# Patient Record
Sex: FEMALE | Race: WHITE | NOT HISPANIC OR LATINO
[De-identification: names, ages, dates, MRNs, and addresses within clinical notes are randomized per-mention and may not be internally consistent; named-entity substitution may affect disease eponyms.]

---

## 2018-08-28 ENCOUNTER — APPOINTMENT (OUTPATIENT)
Dept: NUCLEAR MEDICINE | Facility: CLINIC | Age: 83
End: 2018-08-28
Payer: MEDICARE

## 2018-08-28 ENCOUNTER — OUTPATIENT (OUTPATIENT)
Dept: OUTPATIENT SERVICES | Facility: HOSPITAL | Age: 83
LOS: 1 days | End: 2018-08-28

## 2018-08-28 DIAGNOSIS — E21.3 HYPERPARATHYROIDISM, UNSPECIFIED: ICD-10-CM

## 2018-08-28 PROCEDURE — 78071 PARATHYRD PLANAR W/WO SUBTRJ: CPT | Mod: 26

## 2018-12-29 ENCOUNTER — INPATIENT (INPATIENT)
Facility: HOSPITAL | Age: 83
LOS: 4 days | Discharge: NURSING, REHAB OR LONG-TERM CARE | DRG: 261 | End: 2019-01-03
Attending: SURGERY | Admitting: SURGERY
Payer: MEDICARE

## 2018-12-29 VITALS
RESPIRATION RATE: 18 BRPM | DIASTOLIC BLOOD PRESSURE: 84 MMHG | SYSTOLIC BLOOD PRESSURE: 171 MMHG | OXYGEN SATURATION: 93 % | HEIGHT: 62 IN | WEIGHT: 130.07 LBS | HEART RATE: 86 BPM | TEMPERATURE: 98 F

## 2018-12-29 DIAGNOSIS — S22.39XA FRACTURE OF ONE RIB, UNSPECIFIED SIDE, INITIAL ENCOUNTER FOR CLOSED FRACTURE: ICD-10-CM

## 2018-12-29 LAB
ABO RH CONFIRMATION: SIGNIFICANT CHANGE UP
ALBUMIN SERPL ELPH-MCNC: 3.8 G/DL — SIGNIFICANT CHANGE UP (ref 3.3–5.2)
ALP SERPL-CCNC: 78 U/L — SIGNIFICANT CHANGE UP (ref 40–120)
ALT FLD-CCNC: 11 U/L — SIGNIFICANT CHANGE UP
ANION GAP SERPL CALC-SCNC: 8 MMOL/L — SIGNIFICANT CHANGE UP (ref 5–17)
APPEARANCE UR: CLEAR — SIGNIFICANT CHANGE UP
APTT BLD: 31.6 SEC — SIGNIFICANT CHANGE UP (ref 27.5–36.3)
AST SERPL-CCNC: 24 U/L — SIGNIFICANT CHANGE UP
BACTERIA # UR AUTO: ABNORMAL
BASOPHILS # BLD AUTO: 0 K/UL — SIGNIFICANT CHANGE UP (ref 0–0.2)
BASOPHILS NFR BLD AUTO: 0.3 % — SIGNIFICANT CHANGE UP (ref 0–2)
BILIRUB SERPL-MCNC: 0.5 MG/DL — SIGNIFICANT CHANGE UP (ref 0.4–2)
BILIRUB UR-MCNC: NEGATIVE — SIGNIFICANT CHANGE UP
BLD GP AB SCN SERPL QL: SIGNIFICANT CHANGE UP
BUN SERPL-MCNC: 22 MG/DL — HIGH (ref 8–20)
CALCIUM SERPL-MCNC: 12.1 MG/DL — HIGH (ref 8.6–10.2)
CHLORIDE SERPL-SCNC: 102 MMOL/L — SIGNIFICANT CHANGE UP (ref 98–107)
CO2 SERPL-SCNC: 25 MMOL/L — SIGNIFICANT CHANGE UP (ref 22–29)
COLOR SPEC: YELLOW — SIGNIFICANT CHANGE UP
CREAT SERPL-MCNC: 1.19 MG/DL — SIGNIFICANT CHANGE UP (ref 0.5–1.3)
DIFF PNL FLD: ABNORMAL
EOSINOPHIL # BLD AUTO: 0 K/UL — SIGNIFICANT CHANGE UP (ref 0–0.5)
EOSINOPHIL NFR BLD AUTO: 0.1 % — SIGNIFICANT CHANGE UP (ref 0–6)
EPI CELLS # UR: SIGNIFICANT CHANGE UP
GLUCOSE SERPL-MCNC: 127 MG/DL — HIGH (ref 70–115)
GLUCOSE UR QL: NEGATIVE MG/DL — SIGNIFICANT CHANGE UP
HCT VFR BLD CALC: 42.9 % — SIGNIFICANT CHANGE UP (ref 37–47)
HGB BLD-MCNC: 14.1 G/DL — SIGNIFICANT CHANGE UP (ref 12–16)
INR BLD: 1.07 RATIO — SIGNIFICANT CHANGE UP (ref 0.88–1.16)
KETONES UR-MCNC: NEGATIVE — SIGNIFICANT CHANGE UP
LEUKOCYTE ESTERASE UR-ACNC: ABNORMAL
LYMPHOCYTES # BLD AUTO: 1.1 K/UL — SIGNIFICANT CHANGE UP (ref 1–4.8)
LYMPHOCYTES # BLD AUTO: 10.5 % — LOW (ref 20–55)
MCHC RBC-ENTMCNC: 31.6 PG — HIGH (ref 27–31)
MCHC RBC-ENTMCNC: 32.9 G/DL — SIGNIFICANT CHANGE UP (ref 32–36)
MCV RBC AUTO: 96.2 FL — SIGNIFICANT CHANGE UP (ref 81–99)
MONOCYTES # BLD AUTO: 0.5 K/UL — SIGNIFICANT CHANGE UP (ref 0–0.8)
MONOCYTES NFR BLD AUTO: 4.6 % — SIGNIFICANT CHANGE UP (ref 3–10)
NEUTROPHILS # BLD AUTO: 9 K/UL — HIGH (ref 1.8–8)
NEUTROPHILS NFR BLD AUTO: 84.3 % — HIGH (ref 37–73)
NITRITE UR-MCNC: POSITIVE
PH UR: 7 — SIGNIFICANT CHANGE UP (ref 5–8)
PLATELET # BLD AUTO: 164 K/UL — SIGNIFICANT CHANGE UP (ref 150–400)
POTASSIUM SERPL-MCNC: 4.1 MMOL/L — SIGNIFICANT CHANGE UP (ref 3.5–5.3)
POTASSIUM SERPL-SCNC: 4.1 MMOL/L — SIGNIFICANT CHANGE UP (ref 3.5–5.3)
PROT SERPL-MCNC: 8.1 G/DL — SIGNIFICANT CHANGE UP (ref 6.6–8.7)
PROT UR-MCNC: 15 MG/DL
PROTHROM AB SERPL-ACNC: 12.3 SEC — SIGNIFICANT CHANGE UP (ref 10–12.9)
RBC # BLD: 4.46 M/UL — SIGNIFICANT CHANGE UP (ref 4.4–5.2)
RBC # FLD: 14.7 % — SIGNIFICANT CHANGE UP (ref 11–15.6)
RBC CASTS # UR COMP ASSIST: ABNORMAL /HPF (ref 0–4)
SODIUM SERPL-SCNC: 135 MMOL/L — SIGNIFICANT CHANGE UP (ref 135–145)
SP GR SPEC: 1.01 — SIGNIFICANT CHANGE UP (ref 1.01–1.02)
TYPE + AB SCN PNL BLD: SIGNIFICANT CHANGE UP
UROBILINOGEN FLD QL: NEGATIVE MG/DL — SIGNIFICANT CHANGE UP
WBC # BLD: 10.7 K/UL — SIGNIFICANT CHANGE UP (ref 4.8–10.8)
WBC # FLD AUTO: 10.7 K/UL — SIGNIFICANT CHANGE UP (ref 4.8–10.8)
WBC UR QL: ABNORMAL

## 2018-12-29 PROCEDURE — 99222 1ST HOSP IP/OBS MODERATE 55: CPT

## 2018-12-29 PROCEDURE — 99285 EMERGENCY DEPT VISIT HI MDM: CPT

## 2018-12-29 PROCEDURE — 70450 CT HEAD/BRAIN W/O DYE: CPT | Mod: 26

## 2018-12-29 PROCEDURE — 72125 CT NECK SPINE W/O DYE: CPT | Mod: 26

## 2018-12-29 PROCEDURE — 93010 ELECTROCARDIOGRAM REPORT: CPT

## 2018-12-29 PROCEDURE — 73521 X-RAY EXAM HIPS BI 2 VIEWS: CPT | Mod: 26

## 2018-12-29 PROCEDURE — 71250 CT THORAX DX C-: CPT | Mod: 26

## 2018-12-29 RX ORDER — ACETAMINOPHEN 500 MG
975 TABLET ORAL ONCE
Qty: 0 | Refills: 0 | Status: COMPLETED | OUTPATIENT
Start: 2018-12-29 | End: 2018-12-29

## 2018-12-29 RX ORDER — ONDANSETRON 8 MG/1
4 TABLET, FILM COATED ORAL EVERY 6 HOURS
Qty: 0 | Refills: 0 | Status: DISCONTINUED | OUTPATIENT
Start: 2018-12-29 | End: 2018-12-29

## 2018-12-29 RX ORDER — SENNA PLUS 8.6 MG/1
2 TABLET ORAL AT BEDTIME
Qty: 0 | Refills: 0 | Status: DISCONTINUED | OUTPATIENT
Start: 2018-12-29 | End: 2019-01-03

## 2018-12-29 RX ORDER — KETOROLAC TROMETHAMINE 30 MG/ML
10 SYRINGE (ML) INJECTION EVERY 6 HOURS
Qty: 0 | Refills: 0 | Status: DISCONTINUED | OUTPATIENT
Start: 2018-12-29 | End: 2018-12-30

## 2018-12-29 RX ORDER — ATORVASTATIN CALCIUM 80 MG/1
10 TABLET, FILM COATED ORAL AT BEDTIME
Qty: 0 | Refills: 0 | Status: DISCONTINUED | OUTPATIENT
Start: 2018-12-29 | End: 2019-01-03

## 2018-12-29 RX ORDER — ACETAMINOPHEN 500 MG
650 TABLET ORAL EVERY 6 HOURS
Qty: 0 | Refills: 0 | Status: DISCONTINUED | OUTPATIENT
Start: 2018-12-29 | End: 2019-01-03

## 2018-12-29 RX ORDER — OXYCODONE HYDROCHLORIDE 5 MG/1
5 TABLET ORAL EVERY 4 HOURS
Qty: 0 | Refills: 0 | Status: DISCONTINUED | OUTPATIENT
Start: 2018-12-29 | End: 2018-12-29

## 2018-12-29 RX ORDER — AMLODIPINE BESYLATE 2.5 MG/1
5 TABLET ORAL DAILY
Qty: 0 | Refills: 0 | Status: DISCONTINUED | OUTPATIENT
Start: 2018-12-29 | End: 2018-12-31

## 2018-12-29 RX ORDER — ASPIRIN/CALCIUM CARB/MAGNESIUM 324 MG
81 TABLET ORAL DAILY
Qty: 0 | Refills: 0 | Status: DISCONTINUED | OUTPATIENT
Start: 2018-12-29 | End: 2019-01-03

## 2018-12-29 RX ORDER — LISINOPRIL 2.5 MG/1
20 TABLET ORAL DAILY
Qty: 0 | Refills: 0 | Status: DISCONTINUED | OUTPATIENT
Start: 2018-12-29 | End: 2018-12-31

## 2018-12-29 RX ORDER — METHOCARBAMOL 500 MG/1
750 TABLET, FILM COATED ORAL EVERY 6 HOURS
Qty: 0 | Refills: 0 | Status: DISCONTINUED | OUTPATIENT
Start: 2018-12-29 | End: 2018-12-31

## 2018-12-29 RX ORDER — OXYCODONE HYDROCHLORIDE 5 MG/1
10 TABLET ORAL EVERY 4 HOURS
Qty: 0 | Refills: 0 | Status: DISCONTINUED | OUTPATIENT
Start: 2018-12-29 | End: 2018-12-29

## 2018-12-29 RX ORDER — DOCUSATE SODIUM 100 MG
100 CAPSULE ORAL THREE TIMES A DAY
Qty: 0 | Refills: 0 | Status: DISCONTINUED | OUTPATIENT
Start: 2018-12-29 | End: 2019-01-03

## 2018-12-29 RX ORDER — LIDOCAINE 4 G/100G
1 CREAM TOPICAL EVERY 24 HOURS
Qty: 0 | Refills: 0 | Status: DISCONTINUED | OUTPATIENT
Start: 2018-12-29 | End: 2019-01-03

## 2018-12-29 RX ORDER — LEVOTHYROXINE SODIUM 125 MCG
88 TABLET ORAL DAILY
Qty: 0 | Refills: 0 | Status: DISCONTINUED | OUTPATIENT
Start: 2018-12-29 | End: 2019-01-03

## 2018-12-29 RX ORDER — GABAPENTIN 400 MG/1
300 CAPSULE ORAL EVERY 8 HOURS
Qty: 0 | Refills: 0 | Status: DISCONTINUED | OUTPATIENT
Start: 2018-12-29 | End: 2018-12-31

## 2018-12-29 RX ORDER — ENOXAPARIN SODIUM 100 MG/ML
30 INJECTION SUBCUTANEOUS EVERY 12 HOURS
Qty: 0 | Refills: 0 | Status: DISCONTINUED | OUTPATIENT
Start: 2018-12-29 | End: 2018-12-30

## 2018-12-29 RX ADMIN — Medication 10 MILLIGRAM(S): at 18:17

## 2018-12-29 RX ADMIN — Medication 975 MILLIGRAM(S): at 07:01

## 2018-12-29 RX ADMIN — Medication 975 MILLIGRAM(S): at 08:11

## 2018-12-29 RX ADMIN — GABAPENTIN 300 MILLIGRAM(S): 400 CAPSULE ORAL at 11:59

## 2018-12-29 RX ADMIN — Medication 650 MILLIGRAM(S): at 11:59

## 2018-12-29 RX ADMIN — METHOCARBAMOL 750 MILLIGRAM(S): 500 TABLET, FILM COATED ORAL at 17:17

## 2018-12-29 RX ADMIN — Medication 100 MILLIGRAM(S): at 21:40

## 2018-12-29 RX ADMIN — Medication 10 MILLIGRAM(S): at 11:59

## 2018-12-29 RX ADMIN — Medication 10 MILLIGRAM(S): at 17:17

## 2018-12-29 RX ADMIN — ENOXAPARIN SODIUM 30 MILLIGRAM(S): 100 INJECTION SUBCUTANEOUS at 17:17

## 2018-12-29 RX ADMIN — Medication 650 MILLIGRAM(S): at 12:59

## 2018-12-29 RX ADMIN — ATORVASTATIN CALCIUM 10 MILLIGRAM(S): 80 TABLET, FILM COATED ORAL at 21:40

## 2018-12-29 RX ADMIN — METHOCARBAMOL 750 MILLIGRAM(S): 500 TABLET, FILM COATED ORAL at 11:59

## 2018-12-29 RX ADMIN — LIDOCAINE 1 PATCH: 4 CREAM TOPICAL at 12:00

## 2018-12-29 RX ADMIN — GABAPENTIN 300 MILLIGRAM(S): 400 CAPSULE ORAL at 21:41

## 2018-12-29 RX ADMIN — ONDANSETRON 4 MILLIGRAM(S): 8 TABLET, FILM COATED ORAL at 11:59

## 2018-12-29 RX ADMIN — Medication 650 MILLIGRAM(S): at 17:17

## 2018-12-29 RX ADMIN — SENNA PLUS 2 TABLET(S): 8.6 TABLET ORAL at 21:41

## 2018-12-29 RX ADMIN — AMLODIPINE BESYLATE 5 MILLIGRAM(S): 2.5 TABLET ORAL at 12:00

## 2018-12-29 RX ADMIN — Medication 10 MILLIGRAM(S): at 00:00

## 2018-12-29 RX ADMIN — Medication 650 MILLIGRAM(S): at 18:17

## 2018-12-29 RX ADMIN — LIDOCAINE 1 PATCH: 4 CREAM TOPICAL at 19:30

## 2018-12-29 NOTE — H&P ADULT - NSHPSOCIALHISTORY_GEN_ALL_CORE
denies toxic habits x3  lives with daughter in basement level of house. does not ambulate with assistive device

## 2018-12-29 NOTE — H&P ADULT - HISTORY OF PRESENT ILLNESS
85 year old female BIBEMs after an unwitnessed fall this morning while trying to use the restroom. Patient reports her legs became weak and she fell while hitting the left side of her body on the vanity. No loss of consciousness, but did not attempt to ambulate after the fall. Daughter found patient on the bathroom floor shortly after the incident. Complains of left sided chest wall pain, but no shortness of breath     A: airway intact  B: breath sounds clear to auscultation bilaterally  C: central/peripheral pulse 2+ bilaterally  D: GCS 15, pupils 3mm equal and reactive to light bilaterally  E: full exposure achieved with no gross deformities noted

## 2018-12-29 NOTE — ED PROVIDER NOTE - CHPI ED SYMPTOMS NEG
no bleeding/no confusion/no deformity/no weakness/no tingling/no abrasion/no fever/no numbness/no loss of consciousness/no vomiting

## 2018-12-29 NOTE — H&P ADULT - NSHPREVIEWOFSYSTEMS_GEN_ALL_CORE
denies fevers, chills, nausea, vomiting, chest pain, palpitations, abdominal pain, numbness/weakness in extremities

## 2018-12-29 NOTE — ED ADULT NURSE NOTE - OBJECTIVE STATEMENT
Patient A&Ox4 complaining of pain to left side ribs secondary to fall. Patient stated stood up & lost balance due to "knees buckling." Denies any shortness of breath, nausea or dizziness.

## 2018-12-29 NOTE — ED PROVIDER NOTE - MEDICAL DECISION MAKING DETAILS
see progress;  signed over to oncoming physician pending trauma eval Will obtain labs and CT for rib fx  see progress;  signed over to oncoming physician pending trauma eval

## 2018-12-29 NOTE — ED ADULT TRIAGE NOTE - CHIEF COMPLAINT QUOTE
I took a while to get off my recliner to go to bathroom  I got up and finally went to the bathroom both of my legs gave up and fell to floor , my left side  ribs hurt I did not hit my head, pt alert and oriented x 4 no visible signs of injuries noted pt [place]  denies blood thinners

## 2018-12-29 NOTE — ED ADULT NURSE NOTE - CHIEF COMPLAINT QUOTE
I took a while to get off my recliner to go to bathroom  I got up and finally went to the bathroom both of my legs gave up and fell to floor , my left side  ribs hurt I did not hit my head, pt alert and oriented x 4 no visible signs of injuries noted pt [place]  denies blood thinners

## 2018-12-29 NOTE — H&P ADULT - NSHPPHYSICALEXAM_GEN_ALL_CORE
Constitutional: Well-developed well nourished female in no acute distress  HEENT: Head is normocephalic and atraumatic, maxillofacial structures stable, no blood or discharge from nares or oral cavity, no robertson sign / raccoon eyes, EOMI b/l, pupils 3mm round and reactive to light b/l, no active drainage or redness  Neck: trachea midline  Respiratory: Breath sounds CTA b/l respirations are unlabored, no accessory muscle use, no conversational dyspnea  Cardiovascular: Regular rate & rhythm, +S1, S2  Chest: left lateral chest wall tenderness, no ecchymosis, no subQ emphysema or crepitus palpated  Gastrointestinal: Abdomen soft, non-tender, non-distended, no rebound tenderness / guarding, no ecchymosis or external signs of abdominal trauma  Extremities: moving all extremities spontaneously, no point tenderness or deformity noted to upper or lower extremities b/l  Pelvis: stable  Vascular: 2+ radial, femoral, and DP pulses b/l  Neurological: GCS: 15 (4/5/6). A&O x 3; no gross sensory / motor / coordination deficits  Musculoskeletal: 5/5 strength of upper and lower extremities b/l  Back: no C/T/LS spine tenderness to palpation, no step-offs or signs of external trauma to the back

## 2018-12-29 NOTE — H&P ADULT - ASSESSMENT
85 year old female s/p mechanical fall without loss of consciousness sustaining left sided 5th/6th rib fractures. Not requiring ICU admission, but will admit to surgical floor for pain control, and pulmonary exercises

## 2018-12-29 NOTE — H&P ADULT - ATTENDING COMMENTS
Patient seen and examined.  went to bathroom and patient states " legs gave out" complaining left chest pain.  ABCDE intact.  CT reviewed 2 left rib fractures.  Admit for multimodality analgesia  PT eval  EKG.  Cardiology to be consulted.  Aware of long history of hypercalcemia, Family states her parathyroids and pancreas have been studied and all work up has been negative.

## 2018-12-29 NOTE — ED PROVIDER NOTE - ATTENDING CONTRIBUTION TO CARE
seen with acp: pleasant elderly female with left anterolateral rib pain s/p mechanical trip and fall; head NCAT, no c spine ttp; +ttp left anterolateral upper ribs; normal heart and lung sounds; abd soft nt nd; no pelvic bone ttp, +FROM b/l hips and knees; pain control, ct imaging noted, trauma consulted, signed over to oncoming physician

## 2018-12-29 NOTE — H&P ADULT - PROBLEM SELECTOR PLAN 1
-admit to ACS  -PIC protocol  -pain control  -encourage IS use  -restart home medications  -regular diet  -PT/OT/PMR  -dvt ppx

## 2018-12-29 NOTE — ED PROVIDER NOTE - OBJECTIVE STATEMENT
PT is an 85y F presenting Hypercholesterolemia Hypertension Hypothyroid presenting s/p fall after legs gave out in bathroom. She states she hit her L ribs on the corner of the bathroom vanity. She denies any head injury or LOC. She is normally ambulatory without assistance. She denies any nausea/vomiting. She reports difficulty with inspiration but no respiratory distress. No other complaints.

## 2018-12-30 LAB
ALBUMIN SERPL ELPH-MCNC: 3.7 G/DL — SIGNIFICANT CHANGE UP (ref 3.3–5.2)
ALP SERPL-CCNC: 70 U/L — SIGNIFICANT CHANGE UP (ref 40–120)
ALT FLD-CCNC: 9 U/L — SIGNIFICANT CHANGE UP
ANION GAP SERPL CALC-SCNC: 11 MMOL/L — SIGNIFICANT CHANGE UP (ref 5–17)
APTT BLD: 46 SEC — HIGH (ref 27.5–36.3)
AST SERPL-CCNC: 21 U/L — SIGNIFICANT CHANGE UP
BASOPHILS # BLD AUTO: 0 K/UL — SIGNIFICANT CHANGE UP (ref 0–0.2)
BASOPHILS NFR BLD AUTO: 0.2 % — SIGNIFICANT CHANGE UP (ref 0–2)
BILIRUB SERPL-MCNC: 0.6 MG/DL — SIGNIFICANT CHANGE UP (ref 0.4–2)
BUN SERPL-MCNC: 25 MG/DL — HIGH (ref 8–20)
CALCIUM SERPL-MCNC: 11.4 MG/DL — HIGH (ref 8.6–10.2)
CHLORIDE SERPL-SCNC: 101 MMOL/L — SIGNIFICANT CHANGE UP (ref 98–107)
CO2 SERPL-SCNC: 23 MMOL/L — SIGNIFICANT CHANGE UP (ref 22–29)
CREAT SERPL-MCNC: 1.48 MG/DL — HIGH (ref 0.5–1.3)
EOSINOPHIL # BLD AUTO: 0.3 K/UL — SIGNIFICANT CHANGE UP (ref 0–0.5)
EOSINOPHIL NFR BLD AUTO: 2.5 % — SIGNIFICANT CHANGE UP (ref 0–6)
GLUCOSE SERPL-MCNC: 89 MG/DL — SIGNIFICANT CHANGE UP (ref 70–115)
HCT VFR BLD CALC: 41.9 % — SIGNIFICANT CHANGE UP (ref 37–47)
HGB BLD-MCNC: 14 G/DL — SIGNIFICANT CHANGE UP (ref 12–16)
INR BLD: 1.15 RATIO — SIGNIFICANT CHANGE UP (ref 0.88–1.16)
LYMPHOCYTES # BLD AUTO: 1.3 K/UL — SIGNIFICANT CHANGE UP (ref 1–4.8)
LYMPHOCYTES # BLD AUTO: 12.6 % — LOW (ref 20–55)
MAGNESIUM SERPL-MCNC: 2.2 MG/DL — SIGNIFICANT CHANGE UP (ref 1.6–2.6)
MCHC RBC-ENTMCNC: 32.6 PG — HIGH (ref 27–31)
MCHC RBC-ENTMCNC: 33.4 G/DL — SIGNIFICANT CHANGE UP (ref 32–36)
MCV RBC AUTO: 97.4 FL — SIGNIFICANT CHANGE UP (ref 81–99)
MONOCYTES # BLD AUTO: 0.7 K/UL — SIGNIFICANT CHANGE UP (ref 0–0.8)
MONOCYTES NFR BLD AUTO: 6.7 % — SIGNIFICANT CHANGE UP (ref 3–10)
NEUTROPHILS # BLD AUTO: 8 K/UL — SIGNIFICANT CHANGE UP (ref 1.8–8)
NEUTROPHILS NFR BLD AUTO: 77.7 % — HIGH (ref 37–73)
PHOSPHATE SERPL-MCNC: 2.7 MG/DL — SIGNIFICANT CHANGE UP (ref 2.4–4.7)
PLATELET # BLD AUTO: 165 K/UL — SIGNIFICANT CHANGE UP (ref 150–400)
POTASSIUM SERPL-MCNC: 4.4 MMOL/L — SIGNIFICANT CHANGE UP (ref 3.5–5.3)
POTASSIUM SERPL-SCNC: 4.4 MMOL/L — SIGNIFICANT CHANGE UP (ref 3.5–5.3)
PROT SERPL-MCNC: 7.4 G/DL — SIGNIFICANT CHANGE UP (ref 6.6–8.7)
PROTHROM AB SERPL-ACNC: 13.3 SEC — HIGH (ref 10–12.9)
RBC # BLD: 4.3 M/UL — LOW (ref 4.4–5.2)
RBC # FLD: 14.9 % — SIGNIFICANT CHANGE UP (ref 11–15.6)
SODIUM SERPL-SCNC: 135 MMOL/L — SIGNIFICANT CHANGE UP (ref 135–145)
WBC # BLD: 10.3 K/UL — SIGNIFICANT CHANGE UP (ref 4.8–10.8)
WBC # FLD AUTO: 10.3 K/UL — SIGNIFICANT CHANGE UP (ref 4.8–10.8)

## 2018-12-30 PROCEDURE — 93010 ELECTROCARDIOGRAM REPORT: CPT

## 2018-12-30 RX ORDER — SODIUM CHLORIDE 9 MG/ML
1000 INJECTION, SOLUTION INTRAVENOUS
Qty: 0 | Refills: 0 | Status: DISCONTINUED | OUTPATIENT
Start: 2018-12-30 | End: 2019-01-01

## 2018-12-30 RX ORDER — HEPARIN SODIUM 5000 [USP'U]/ML
5000 INJECTION INTRAVENOUS; SUBCUTANEOUS EVERY 8 HOURS
Qty: 0 | Refills: 0 | Status: DISCONTINUED | OUTPATIENT
Start: 2018-12-30 | End: 2019-01-03

## 2018-12-30 RX ADMIN — Medication 100 MILLIGRAM(S): at 22:11

## 2018-12-30 RX ADMIN — LIDOCAINE 1 PATCH: 4 CREAM TOPICAL at 00:00

## 2018-12-30 RX ADMIN — AMLODIPINE BESYLATE 5 MILLIGRAM(S): 2.5 TABLET ORAL at 05:19

## 2018-12-30 RX ADMIN — SENNA PLUS 2 TABLET(S): 8.6 TABLET ORAL at 22:11

## 2018-12-30 RX ADMIN — METHOCARBAMOL 750 MILLIGRAM(S): 500 TABLET, FILM COATED ORAL at 13:11

## 2018-12-30 RX ADMIN — Medication 650 MILLIGRAM(S): at 13:11

## 2018-12-30 RX ADMIN — Medication 650 MILLIGRAM(S): at 06:19

## 2018-12-30 RX ADMIN — GABAPENTIN 300 MILLIGRAM(S): 400 CAPSULE ORAL at 05:19

## 2018-12-30 RX ADMIN — Medication 100 MILLIGRAM(S): at 05:19

## 2018-12-30 RX ADMIN — Medication 650 MILLIGRAM(S): at 18:23

## 2018-12-30 RX ADMIN — GABAPENTIN 300 MILLIGRAM(S): 400 CAPSULE ORAL at 13:11

## 2018-12-30 RX ADMIN — LISINOPRIL 20 MILLIGRAM(S): 2.5 TABLET ORAL at 05:19

## 2018-12-30 RX ADMIN — Medication 650 MILLIGRAM(S): at 22:12

## 2018-12-30 RX ADMIN — Medication 100 MILLIGRAM(S): at 13:11

## 2018-12-30 RX ADMIN — HEPARIN SODIUM 5000 UNIT(S): 5000 INJECTION INTRAVENOUS; SUBCUTANEOUS at 16:07

## 2018-12-30 RX ADMIN — Medication 81 MILLIGRAM(S): at 13:11

## 2018-12-30 RX ADMIN — LIDOCAINE 1 PATCH: 4 CREAM TOPICAL at 13:12

## 2018-12-30 RX ADMIN — Medication 650 MILLIGRAM(S): at 14:11

## 2018-12-30 RX ADMIN — Medication 650 MILLIGRAM(S): at 17:23

## 2018-12-30 RX ADMIN — HEPARIN SODIUM 5000 UNIT(S): 5000 INJECTION INTRAVENOUS; SUBCUTANEOUS at 22:13

## 2018-12-30 RX ADMIN — METHOCARBAMOL 750 MILLIGRAM(S): 500 TABLET, FILM COATED ORAL at 17:23

## 2018-12-30 RX ADMIN — HEPARIN SODIUM 5000 UNIT(S): 5000 INJECTION INTRAVENOUS; SUBCUTANEOUS at 05:18

## 2018-12-30 RX ADMIN — METHOCARBAMOL 750 MILLIGRAM(S): 500 TABLET, FILM COATED ORAL at 23:40

## 2018-12-30 RX ADMIN — Medication 650 MILLIGRAM(S): at 23:12

## 2018-12-30 RX ADMIN — Medication 88 MICROGRAM(S): at 05:19

## 2018-12-30 RX ADMIN — ATORVASTATIN CALCIUM 10 MILLIGRAM(S): 80 TABLET, FILM COATED ORAL at 22:11

## 2018-12-30 RX ADMIN — GABAPENTIN 300 MILLIGRAM(S): 400 CAPSULE ORAL at 22:11

## 2018-12-30 RX ADMIN — SODIUM CHLORIDE 75 MILLILITER(S): 9 INJECTION, SOLUTION INTRAVENOUS at 17:23

## 2018-12-30 RX ADMIN — Medication 650 MILLIGRAM(S): at 05:19

## 2018-12-30 NOTE — PROVIDER CONTACT NOTE (OTHER) - SITUATION
Patient has living will in chart expressing wishes to be DNR.  Currently no signed DNR in chart.  RN staff have spoke with ACS team since 12/29/18.

## 2018-12-31 LAB
-  AMIKACIN: SIGNIFICANT CHANGE UP
-  AMPICILLIN/SULBACTAM: SIGNIFICANT CHANGE UP
-  AMPICILLIN: SIGNIFICANT CHANGE UP
-  AZTREONAM: SIGNIFICANT CHANGE UP
-  CEFAZOLIN: SIGNIFICANT CHANGE UP
-  CEFEPIME: SIGNIFICANT CHANGE UP
-  CEFOXITIN: SIGNIFICANT CHANGE UP
-  CEFTRIAXONE: SIGNIFICANT CHANGE UP
-  CIPROFLOXACIN: SIGNIFICANT CHANGE UP
-  ERTAPENEM: SIGNIFICANT CHANGE UP
-  GENTAMICIN: SIGNIFICANT CHANGE UP
-  IMIPENEM: SIGNIFICANT CHANGE UP
-  LEVOFLOXACIN: SIGNIFICANT CHANGE UP
-  MEROPENEM: SIGNIFICANT CHANGE UP
-  NITROFURANTOIN: SIGNIFICANT CHANGE UP
-  PIPERACILLIN/TAZOBACTAM: SIGNIFICANT CHANGE UP
-  TIGECYCLINE: SIGNIFICANT CHANGE UP
-  TOBRAMYCIN: SIGNIFICANT CHANGE UP
-  TRIMETHOPRIM/SULFAMETHOXAZOLE: SIGNIFICANT CHANGE UP
ANION GAP SERPL CALC-SCNC: 9 MMOL/L — SIGNIFICANT CHANGE UP (ref 5–17)
BASOPHILS # BLD AUTO: 0 K/UL — SIGNIFICANT CHANGE UP (ref 0–0.2)
BASOPHILS NFR BLD AUTO: 0.5 % — SIGNIFICANT CHANGE UP (ref 0–2)
BUN SERPL-MCNC: 28 MG/DL — HIGH (ref 8–20)
CALCIUM SERPL-MCNC: 10.3 MG/DL — HIGH (ref 8.6–10.2)
CHLORIDE SERPL-SCNC: 102 MMOL/L — SIGNIFICANT CHANGE UP (ref 98–107)
CO2 SERPL-SCNC: 22 MMOL/L — SIGNIFICANT CHANGE UP (ref 22–29)
CREAT SERPL-MCNC: 1.29 MG/DL — SIGNIFICANT CHANGE UP (ref 0.5–1.3)
CULTURE RESULTS: SIGNIFICANT CHANGE UP
EOSINOPHIL # BLD AUTO: 0.2 K/UL — SIGNIFICANT CHANGE UP (ref 0–0.5)
EOSINOPHIL NFR BLD AUTO: 2.9 % — SIGNIFICANT CHANGE UP (ref 0–6)
GLUCOSE BLDC GLUCOMTR-MCNC: 93 MG/DL — SIGNIFICANT CHANGE UP (ref 70–99)
GLUCOSE SERPL-MCNC: 101 MG/DL — SIGNIFICANT CHANGE UP (ref 70–115)
HCT VFR BLD CALC: 37.4 % — SIGNIFICANT CHANGE UP (ref 37–47)
HGB BLD-MCNC: 12.1 G/DL — SIGNIFICANT CHANGE UP (ref 12–16)
LYMPHOCYTES # BLD AUTO: 1.6 K/UL — SIGNIFICANT CHANGE UP (ref 1–4.8)
LYMPHOCYTES # BLD AUTO: 18.3 % — LOW (ref 20–55)
MAGNESIUM SERPL-MCNC: 1.9 MG/DL — SIGNIFICANT CHANGE UP (ref 1.6–2.6)
MCHC RBC-ENTMCNC: 31.7 PG — HIGH (ref 27–31)
MCHC RBC-ENTMCNC: 32.4 G/DL — SIGNIFICANT CHANGE UP (ref 32–36)
MCV RBC AUTO: 97.9 FL — SIGNIFICANT CHANGE UP (ref 81–99)
METHOD TYPE: SIGNIFICANT CHANGE UP
MONOCYTES # BLD AUTO: 0.6 K/UL — SIGNIFICANT CHANGE UP (ref 0–0.8)
MONOCYTES NFR BLD AUTO: 7.4 % — SIGNIFICANT CHANGE UP (ref 3–10)
NEUTROPHILS # BLD AUTO: 6.1 K/UL — SIGNIFICANT CHANGE UP (ref 1.8–8)
NEUTROPHILS NFR BLD AUTO: 70.7 % — SIGNIFICANT CHANGE UP (ref 37–73)
ORGANISM # SPEC MICROSCOPIC CNT: SIGNIFICANT CHANGE UP
ORGANISM # SPEC MICROSCOPIC CNT: SIGNIFICANT CHANGE UP
PHOSPHATE SERPL-MCNC: 2 MG/DL — LOW (ref 2.4–4.7)
PLATELET # BLD AUTO: 165 K/UL — SIGNIFICANT CHANGE UP (ref 150–400)
POTASSIUM SERPL-MCNC: 4.6 MMOL/L — SIGNIFICANT CHANGE UP (ref 3.5–5.3)
POTASSIUM SERPL-SCNC: 4.6 MMOL/L — SIGNIFICANT CHANGE UP (ref 3.5–5.3)
RBC # BLD: 3.82 M/UL — LOW (ref 4.4–5.2)
RBC # FLD: 14.8 % — SIGNIFICANT CHANGE UP (ref 11–15.6)
SODIUM SERPL-SCNC: 133 MMOL/L — LOW (ref 135–145)
SPECIMEN SOURCE: SIGNIFICANT CHANGE UP
WBC # BLD: 8.6 K/UL — SIGNIFICANT CHANGE UP (ref 4.8–10.8)
WBC # FLD AUTO: 8.6 K/UL — SIGNIFICANT CHANGE UP (ref 4.8–10.8)

## 2018-12-31 PROCEDURE — 93010 ELECTROCARDIOGRAM REPORT: CPT

## 2018-12-31 PROCEDURE — 99222 1ST HOSP IP/OBS MODERATE 55: CPT

## 2018-12-31 PROCEDURE — 99233 SBSQ HOSP IP/OBS HIGH 50: CPT

## 2018-12-31 RX ORDER — SODIUM CHLORIDE 9 MG/ML
500 INJECTION, SOLUTION INTRAVENOUS ONCE
Qty: 0 | Refills: 0 | Status: COMPLETED | OUTPATIENT
Start: 2018-12-31 | End: 2018-12-31

## 2018-12-31 RX ORDER — POTASSIUM PHOSPHATE, MONOBASIC POTASSIUM PHOSPHATE, DIBASIC 236; 224 MG/ML; MG/ML
15 INJECTION, SOLUTION INTRAVENOUS ONCE
Qty: 0 | Refills: 0 | Status: COMPLETED | OUTPATIENT
Start: 2018-12-31 | End: 2018-12-31

## 2018-12-31 RX ORDER — CEPHALEXIN 500 MG
250 CAPSULE ORAL EVERY 8 HOURS
Qty: 0 | Refills: 0 | Status: DISCONTINUED | OUTPATIENT
Start: 2018-12-31 | End: 2019-01-03

## 2018-12-31 RX ADMIN — Medication 100 MILLIGRAM(S): at 05:50

## 2018-12-31 RX ADMIN — Medication 650 MILLIGRAM(S): at 18:55

## 2018-12-31 RX ADMIN — METHOCARBAMOL 750 MILLIGRAM(S): 500 TABLET, FILM COATED ORAL at 05:50

## 2018-12-31 RX ADMIN — Medication 650 MILLIGRAM(S): at 18:24

## 2018-12-31 RX ADMIN — AMLODIPINE BESYLATE 5 MILLIGRAM(S): 2.5 TABLET ORAL at 05:50

## 2018-12-31 RX ADMIN — HEPARIN SODIUM 5000 UNIT(S): 5000 INJECTION INTRAVENOUS; SUBCUTANEOUS at 14:47

## 2018-12-31 RX ADMIN — HEPARIN SODIUM 5000 UNIT(S): 5000 INJECTION INTRAVENOUS; SUBCUTANEOUS at 05:51

## 2018-12-31 RX ADMIN — Medication 650 MILLIGRAM(S): at 14:42

## 2018-12-31 RX ADMIN — LIDOCAINE 1 PATCH: 4 CREAM TOPICAL at 20:36

## 2018-12-31 RX ADMIN — Medication 650 MILLIGRAM(S): at 05:50

## 2018-12-31 RX ADMIN — POTASSIUM PHOSPHATE, MONOBASIC POTASSIUM PHOSPHATE, DIBASIC 62.5 MILLIMOLE(S): 236; 224 INJECTION, SOLUTION INTRAVENOUS at 14:45

## 2018-12-31 RX ADMIN — SODIUM CHLORIDE 500 MILLILITER(S): 9 INJECTION, SOLUTION INTRAVENOUS at 09:43

## 2018-12-31 RX ADMIN — Medication 650 MILLIGRAM(S): at 06:50

## 2018-12-31 RX ADMIN — Medication 81 MILLIGRAM(S): at 14:43

## 2018-12-31 RX ADMIN — Medication 650 MILLIGRAM(S): at 15:15

## 2018-12-31 RX ADMIN — LISINOPRIL 20 MILLIGRAM(S): 2.5 TABLET ORAL at 05:50

## 2018-12-31 RX ADMIN — GABAPENTIN 300 MILLIGRAM(S): 400 CAPSULE ORAL at 05:50

## 2018-12-31 RX ADMIN — Medication 100 MILLIGRAM(S): at 12:57

## 2018-12-31 RX ADMIN — LIDOCAINE 1 PATCH: 4 CREAM TOPICAL at 01:00

## 2018-12-31 RX ADMIN — Medication 88 MICROGRAM(S): at 05:50

## 2018-12-31 RX ADMIN — LIDOCAINE 1 PATCH: 4 CREAM TOPICAL at 14:44

## 2018-12-31 NOTE — OCCUPATIONAL THERAPY INITIAL EVALUATION ADULT - NS ASR FOLLOW COMMAND OT EVAL
100% of the time/pt requires increased time and repetition to process commands of assessment/able to follow single-step instructions

## 2018-12-31 NOTE — PHYSICAL THERAPY INITIAL EVALUATION ADULT - ADDITIONAL COMMENTS
Pt lives in a apt in her daughters house with 6 steps to enter with bilateral  rails and 0  stairs inside.   Pt owns medical equipment: SAC-refuses to use as per daughters   Pt lives with: Alone in an apartment in the downstairs of her daughters house.   Daughter is available to provide 24hr care

## 2018-12-31 NOTE — CONSULT NOTE ADULT - SUBJECTIVE AND OBJECTIVE BOX
85yF was admitted on 12-29 after a fall in bathroom. In ED, GCS=15 with left sided chest pain. Workup showed left 5-6 rib fractures. She was placed on PIC protocol.     Pain is currently well managed. Reports some left sided pain with breathing in and coughing.   As per daughters, patient worked with PT and was ambulated to door and back. NO documentation yet.       REVIEW OF SYSTEMS  Constitutional - No fever, No weight loss, +fatigue  HEENT - No eye pain, No visual disturbances, No difficulty hearing, No tinnitus, No vertigo, No neck pain  Respiratory - No cough, No wheezing, No shortness of breath  Cardiovascular - No chest pain, No palpitations  Gastrointestinal - No abdominal pain, No nausea, No vomiting, No diarrhea, No constipation  Genitourinary - No dysuria, No frequency, No hematuria, No incontinence  Neurological - No headaches, No memory loss, No loss of strength, No numbness, No tremors  Skin - No itching, No rashes, No lesions   Endocrine - No temperature intolerance  Musculoskeletal - +joint pain, No joint swelling, +muscle pain  Psychiatric - No depression, +anxiety    VITALS  T(C): 36.6 (12-31-18 @ 08:05), Max: 36.9 (12-31-18 @ 05:03)  HR: 56 (12-31-18 @ 08:05) (56 - 66)  BP: 108/61 (12-31-18 @ 08:05) (101/62 - 131/68)  RR: 18 (12-31-18 @ 08:05) (18 - 18)  SpO2: 99% (12-31-18 @ 08:05) (95% - 99%)  Wt(kg): --    PAST MEDICAL & SURGICAL HISTORY  Hypertension  Hypercholesterolemia  Hypothyroid  No significant past surgical history      SOCIAL HISTORY  Smoking - Denied  EtOH - Denied   Drugs - Denied    FUNCTIONAL HISTORY  Lives with daughter who works  Independent    CURRENT FUNCTIONAL STATUS  Min-Mod A with chair mobility    FAMILY HISTORY   No pertinent family history in first degree relatives      RECENT LABS/IMAGING  CBC Full  -  ( 31 Dec 2018 06:47 )  WBC Count : 8.6 K/uL  Hemoglobin : 12.1 g/dL  Hematocrit : 37.4 %  Platelet Count - Automated : 165 K/uL  Mean Cell Volume : 97.9 fl  Mean Cell Hemoglobin : 31.7 pg  Mean Cell Hemoglobin Concentration : 32.4 g/dL  Auto Neutrophil # : 6.1 K/uL  Auto Lymphocyte # : 1.6 K/uL  Auto Monocyte # : 0.6 K/uL  Auto Eosinophil # : 0.2 K/uL  Auto Basophil # : 0.0 K/uL  Auto Neutrophil % : 70.7 %  Auto Lymphocyte % : 18.3 %  Auto Monocyte % : 7.4 %  Auto Eosinophil % : 2.9 %  Auto Basophil % : 0.5 %    12-31    133<L>  |  102  |  28.0<H>  ----------------------------<  101  4.6   |  22.0  |  1.29    Ca    10.3<H>      31 Dec 2018 06:47  Phos  2.0     12-31  Mg     1.9     12-31    TPro  7.4  /  Alb  3.7  /  TBili  0.6  /  DBili  x   /  AST  21  /  ALT  9   /  AlkPhos  70  12-30        ALLERGIES  penicillins (Unknown)      MEDICATIONS   acetaminophen   Tablet .. 650 milliGRAM(s) Oral every 6 hours  amLODIPine   Tablet 5 milliGRAM(s) Oral daily  aspirin enteric coated 81 milliGRAM(s) Oral daily  atorvastatin 10 milliGRAM(s) Oral at bedtime  docusate sodium 100 milliGRAM(s) Oral three times a day  gabapentin 300 milliGRAM(s) Oral every 8 hours  heparin  Injectable 5000 Unit(s) SubCutaneous every 8 hours  lactated ringers. 1000 milliLiter(s) IV Continuous <Continuous>  levothyroxine 88 MICROGram(s) Oral daily  lidocaine   Patch 1 Patch Transdermal every 24 hours  lisinopril 20 milliGRAM(s) Oral daily  methocarbamol 750 milliGRAM(s) Oral every 6 hours  senna 2 Tablet(s) Oral at bedtime      ----------------------------------------------------------------------------------------  PHYSICAL EXAM  Constitutional - NAD, Comfortable  HEENT - NCAT, EOMI  Neck - Supple, No limited ROM  Chest - Breathing comfortably, No wheezing  Cardiovascular - S1S2   Abdomen - Soft   Extremities - No C/C/E, No calf tenderness   Neurologic Exam -                    Cognitive - Awake, Alert, AAO to self, place, date, year, situation     Communication - Fluent, No dysarthria     Motor - No focal deficits                    LEFT    UE - ShAB 5/5, EF 5/5, EE 5/5, WE 5/5,  5/5                    RIGHT UE - ShAB 5/5, EF 5/5, EE 5/5, WE 5/5,  5/5                    LEFT    LE - HF 5/5, KE 5/5, DF 5/5, PF 5/5                    RIGHT LE - HF 5/5, KE 5/5, DF 5/5, PF 5/5        Sensory - Intact to LT  Psychiatric - Mood stable, Affect WNL  ----------------------------------------------------------------------------------------  ASSESSMENT/PLAN  85yFemale with functional deficits after a mechanical fall sustaining rib fractures  Pain - Tylenol, Neurontin, Lidoderm, Robaxin  CAD - ASA, Lipitor  HTN 0 Norvasc  DVT PPX - SCDs, Heparin  Rehab - Family and patient want a short course of KATE.  Recommend KATE, patient DOES NOT meet acute inpatient rehabilitation criteria. Will sign off, please reconsult if needed for rehab dispo recommendations.
[facility]                                   [address] [place] [address]                                                      PHONE: [phone]                                                         FAX: [phone]  [email or web address]  ---------------------------------------------------------------------------------------------------------------------------------    HPI:  YADIRA LEVI is an 85y Female s/p fall  ( not clear whether due to syncope ).    PAST MEDICAL & SURGICAL HISTORY:  Hypertension  Hypercholesterolemia  Hypothyroid  No significant past surgical history      penicillins (Unknown)      MEDICATIONS  (STANDING):  acetaminophen   Tablet .. 650 milliGRAM(s) Oral every 6 hours  amLODIPine   Tablet 5 milliGRAM(s) Oral daily  aspirin enteric coated 81 milliGRAM(s) Oral daily  atorvastatin 10 milliGRAM(s) Oral at bedtime  docusate sodium 100 milliGRAM(s) Oral three times a day  gabapentin 300 milliGRAM(s) Oral every 8 hours  heparin  Injectable 5000 Unit(s) SubCutaneous every 8 hours  levothyroxine 88 MICROGram(s) Oral daily  lidocaine   Patch 1 Patch Transdermal every 24 hours  lisinopril 20 milliGRAM(s) Oral daily  methocarbamol 750 milliGRAM(s) Oral every 6 hours  senna 2 Tablet(s) Oral at bedtime    MEDICATIONS  (PRN):      Family History: Pt denies hx of early cad, SCD, or congenital heart disease.      Social History:  Cigarettes:        0            Alchohol:         0        Illicit Drug Abuse:  0    ROS:  Constitutional: No fever, weight loss or fatigue  Eyes: No eye pain, visual disturbances, or discharge  ENMT:  No difficulty hearing, tinnitus, vertigo; No sinus or throat pain  Neck: No pain or stiffness  Respiratory: No cough, wheezing, chills or hemoptysis  Cardiovascular: No chest pain, palpitations, shortness of breath, dizziness or leg swelling  Gastrointestinal: No abdominal or epigastric pain. No nausea, vomiting or hematemesis; No diarrhea or constipation. No melena or hematochezia.  Genitourinary: No dysuria, frequency, hematuria or incontinence  Rectal: No pain, hemorrhoids or incontinence  Neurological: No headaches, memory loss, loss of strength, numbness or tremors  Skin: No itching, burning, rashes or lesions   Lymph Nodes: No enlarged glands  Endocrine: No heat or cold intolerance; No hair loss  Musculoskeletal: No joint pain or swelling; No muscle, back or extremity pain  Psychiatric: No depression, anxiety, mood swings or difficulty sleeping  Heme/Lymph: No easy bruising or bleeding gums  Allergy and Immunologic: No hives or eczema    Vital Signs Last 24 Hrs  T(C): 36.6 (30 Dec 2018 04:30), Max: 36.7 (29 Dec 2018 11:54)  T(F): 97.9 (30 Dec 2018 04:30), Max: 98.1 (29 Dec 2018 11:54)  HR: 48 (30 Dec 2018 04:30) (47 - 60)  BP: 124/55 (30 Dec 2018 04:30) (117/69 - 144/76)  BP(mean): 98 (29 Dec 2018 13:50) (98 - 98)  RR: 18 (30 Dec 2018 04:30) (16 - 19)  SpO2: 98% (30 Dec 2018 04:30) (92% - 99%)  ICU Vital Signs Last 24 Hrs  YADIRA LEVI  I&O's Detail    29 Dec 2018 07:  -  30 Dec 2018 07:00  --------------------------------------------------------  IN:    Oral Fluid: 500 mL  Total IN: 500 mL    OUT:  Total OUT: 0 mL    Total NET: 500 mL        I&O's Summary    29 Dec 2018 07:  -  30 Dec 2018 07:00  --------------------------------------------------------  IN: 500 mL / OUT: 0 mL / NET: 500 mL      Drug Dosing Weight  YADIRA LEVI      PHYSICAL EXAM:  General: Appears well developed, well nourished alert and cooperative.  HEENT: Head; normocephalic, atraumatic.  Eyes: Pupils reactive, cornea wnl.  Neck: Supple, no nodes adenopathy, no NVD or carotid bruit or thyromegaly.  CARDIOVASCULAR: Normal S1 and S2, No murmur, rub, gallop or lift.   LUNGS: No rales, rhonchi or wheeze. Normal breath sounds bilaterally.  ABDOMEN: Soft, nontender without mass or organomegaly. bowel sounds normoactive.  EXTREMITIES: No clubbing, cyanosis or edema. Distal pulses wnl.   SKIN: warm and dry with normal turgor.  NEURO: Alert/oriented x 3/normal motor exam. No pathologic reflexes.    PSYCH: normal affect.        LABS:                        14.0   10.3  )-----------( 165      ( 30 Dec 2018 06:55 )             41.9     12    135  |  101  |  25.0<H>  ----------------------------<  89  4.4   |  23.0  |  1.48<H>    Ca    11.4<H>      30 Dec 2018 06:54  Phos  2.7     12-30  Mg     2.2         TPro  7.4  /  Alb  3.7  /  TBili  0.6  /  DBili  x   /  AST  21  /  ALT  9   /  AlkPhos  70      YADIRA LEVI      PT/INR - ( 30 Dec 2018 06:55 )   PT: 13.3 sec;   INR: 1.15 ratio         PTT - ( 30 Dec 2018 06:55 )  PTT:46.0 sec  Urinalysis Basic - ( 29 Dec 2018 06:15 )    Color: Yellow / Appearance: Clear / S.010 / pH: x  Gluc: x / Ketone: Negative  / Bili: Negative / Urobili: Negative mg/dL   Blood: x / Protein: 15 mg/dL / Nitrite: Positive   Leuk Esterase: Moderate / RBC: 3-5 /HPF / WBC 6-10   Sq Epi: x / Non Sq Epi: Occasional / Bacteria: Occasional        RADIOLOGY & ADDITIONAL STUDIES:    INTERPRETATION OF TELEMETRY (personally reviewed):    ECG:    ECHO:    STRESS TEST:    CARDIAC CATHETERIZATION:    Assessment and Plan:  In summary, YADIRA LEVI is an 85y Female with past medical history significant for fall/?syncope. Low HR's noted/EKG unavailable for review-pt not on tele.  Exam not c/w severe AS. Will have EP see for ILR.      Thank you for allowing [facility] to participate in the care of this patient.  Please feel free to call with any questions or concerns.

## 2018-12-31 NOTE — CHART NOTE - NSCHARTNOTEFT_GEN_A_CORE
POST-OPERATIVE NOTE    PROCEDURE: loop recorder placement    INTERVAL HPI: Pain well controlled. Tolerating PO intake.     Vital Signs Last 24 Hrs  T(C): 36.2 (31 Dec 2018 14:22), Max: 36.9 (31 Dec 2018 05:03)  T(F): 97.2 (31 Dec 2018 14:22), Max: 98.5 (31 Dec 2018 05:03)  HR: 62 (31 Dec 2018 14:22) (50 - 67)  BP: 128/58 (31 Dec 2018 14:22) (101/62 - 149/67)  BP(mean): --  RR: 18 (31 Dec 2018 14:22) (14 - 18)  SpO2: 97% (31 Dec 2018 12:50) (95% - 99%)    PE  Gen: alert and oriented  Pulm: on NC, no resp distress  CV: loop recorder in place in anterior L chest, dressing in place c/d/i      85 year old female s/p mechanical fall sustaining nondisplaced left 5th/6th rib fractures admitted for pain control, found to be persistently bradycardic now s/p loop recorder placement, doing well post-procedure

## 2018-12-31 NOTE — OCCUPATIONAL THERAPY INITIAL EVALUATION ADULT - GENERAL OBSERVATIONS, REHAB EVAL
Received pt in semi-mcleod position in bed, +IV, +VCBs, +telemetry, +2LNCO2, daughters present; pt agrees to OT.

## 2018-12-31 NOTE — OCCUPATIONAL THERAPY INITIAL EVALUATION ADULT - PERTINENT HX OF CURRENT PROBLEM, REHAB EVAL
Pt presents to ED s/p fall after legs gave out in bathroom and she hit her left side ribs on the corner of the bathroom vanity. Chest CT reveals minimal buckling fracture of the left fifth and sixth ribs anteriorly; otherwise unremarkable.

## 2018-12-31 NOTE — OCCUPATIONAL THERAPY INITIAL EVALUATION ADULT - ADDITIONAL COMMENTS
Pt lives in downstairs apartment of daughter's house with 6 SARAH and no steps inside. Bathroom has shower stall with curtains and grab bars. Pt owns cane. Pt is right handed. Pt does not drive. Daughter is able to assist upon discharge as needed. Both daughters assist with shopping and doctor's appointments.

## 2018-12-31 NOTE — PHYSICAL THERAPY INITIAL EVALUATION ADULT - GAIT TRAINING, PT EVAL
Time Frame:   3  days   Goal:   CG  assist with  1-person assist with RW x 150 feet / Stairs: pt will navigate  6  stairs with 1   rail with  CGA

## 2019-01-01 LAB
ANION GAP SERPL CALC-SCNC: 9 MMOL/L — SIGNIFICANT CHANGE UP (ref 5–17)
BUN SERPL-MCNC: 24 MG/DL — HIGH (ref 8–20)
CALCIUM SERPL-MCNC: 10.5 MG/DL — HIGH (ref 8.6–10.2)
CHLORIDE SERPL-SCNC: 100 MMOL/L — SIGNIFICANT CHANGE UP (ref 98–107)
CO2 SERPL-SCNC: 24 MMOL/L — SIGNIFICANT CHANGE UP (ref 22–29)
CREAT SERPL-MCNC: 1.18 MG/DL — SIGNIFICANT CHANGE UP (ref 0.5–1.3)
GLUCOSE SERPL-MCNC: 91 MG/DL — SIGNIFICANT CHANGE UP (ref 70–115)
HCT VFR BLD CALC: 39.6 % — SIGNIFICANT CHANGE UP (ref 37–47)
HGB BLD-MCNC: 12.8 G/DL — SIGNIFICANT CHANGE UP (ref 12–16)
MAGNESIUM SERPL-MCNC: 1.8 MG/DL — SIGNIFICANT CHANGE UP (ref 1.6–2.6)
MCHC RBC-ENTMCNC: 31.4 PG — HIGH (ref 27–31)
MCHC RBC-ENTMCNC: 32.3 G/DL — SIGNIFICANT CHANGE UP (ref 32–36)
MCV RBC AUTO: 97.3 FL — SIGNIFICANT CHANGE UP (ref 81–99)
PHOSPHATE SERPL-MCNC: 2.5 MG/DL — SIGNIFICANT CHANGE UP (ref 2.4–4.7)
PLATELET # BLD AUTO: 182 K/UL — SIGNIFICANT CHANGE UP (ref 150–400)
POTASSIUM SERPL-MCNC: 5.2 MMOL/L — SIGNIFICANT CHANGE UP (ref 3.5–5.3)
POTASSIUM SERPL-SCNC: 5.2 MMOL/L — SIGNIFICANT CHANGE UP (ref 3.5–5.3)
RBC # BLD: 4.07 M/UL — LOW (ref 4.4–5.2)
RBC # FLD: 15 % — SIGNIFICANT CHANGE UP (ref 11–15.6)
SODIUM SERPL-SCNC: 133 MMOL/L — LOW (ref 135–145)
WBC # BLD: 8 K/UL — SIGNIFICANT CHANGE UP (ref 4.8–10.8)
WBC # FLD AUTO: 8 K/UL — SIGNIFICANT CHANGE UP (ref 4.8–10.8)

## 2019-01-01 PROCEDURE — 99233 SBSQ HOSP IP/OBS HIGH 50: CPT

## 2019-01-01 RX ORDER — POTASSIUM PHOSPHATE, MONOBASIC POTASSIUM PHOSPHATE, DIBASIC 236; 224 MG/ML; MG/ML
15 INJECTION, SOLUTION INTRAVENOUS ONCE
Qty: 0 | Refills: 0 | Status: DISCONTINUED | OUTPATIENT
Start: 2019-01-01 | End: 2019-01-01

## 2019-01-01 RX ORDER — SODIUM CHLORIDE 9 MG/ML
1000 INJECTION INTRAMUSCULAR; INTRAVENOUS; SUBCUTANEOUS
Qty: 0 | Refills: 0 | Status: DISCONTINUED | OUTPATIENT
Start: 2019-01-01 | End: 2019-01-01

## 2019-01-01 RX ADMIN — Medication 650 MILLIGRAM(S): at 18:41

## 2019-01-01 RX ADMIN — ATORVASTATIN CALCIUM 10 MILLIGRAM(S): 80 TABLET, FILM COATED ORAL at 00:33

## 2019-01-01 RX ADMIN — LIDOCAINE 1 PATCH: 4 CREAM TOPICAL at 12:25

## 2019-01-01 RX ADMIN — Medication 100 MILLIGRAM(S): at 06:48

## 2019-01-01 RX ADMIN — HEPARIN SODIUM 5000 UNIT(S): 5000 INJECTION INTRAVENOUS; SUBCUTANEOUS at 21:43

## 2019-01-01 RX ADMIN — SENNA PLUS 2 TABLET(S): 8.6 TABLET ORAL at 21:44

## 2019-01-01 RX ADMIN — Medication 62.5 MILLIMOLE(S): at 14:44

## 2019-01-01 RX ADMIN — Medication 650 MILLIGRAM(S): at 00:35

## 2019-01-01 RX ADMIN — Medication 88 MICROGRAM(S): at 06:48

## 2019-01-01 RX ADMIN — Medication 81 MILLIGRAM(S): at 12:25

## 2019-01-01 RX ADMIN — Medication 100 MILLIGRAM(S): at 21:44

## 2019-01-01 RX ADMIN — LIDOCAINE 1 PATCH: 4 CREAM TOPICAL at 00:41

## 2019-01-01 RX ADMIN — Medication 250 MILLIGRAM(S): at 14:44

## 2019-01-01 RX ADMIN — HEPARIN SODIUM 5000 UNIT(S): 5000 INJECTION INTRAVENOUS; SUBCUTANEOUS at 07:36

## 2019-01-01 RX ADMIN — HEPARIN SODIUM 5000 UNIT(S): 5000 INJECTION INTRAVENOUS; SUBCUTANEOUS at 00:32

## 2019-01-01 RX ADMIN — Medication 100 MILLIGRAM(S): at 00:34

## 2019-01-01 RX ADMIN — HEPARIN SODIUM 5000 UNIT(S): 5000 INJECTION INTRAVENOUS; SUBCUTANEOUS at 14:46

## 2019-01-01 RX ADMIN — ATORVASTATIN CALCIUM 10 MILLIGRAM(S): 80 TABLET, FILM COATED ORAL at 21:44

## 2019-01-01 RX ADMIN — Medication 650 MILLIGRAM(S): at 01:30

## 2019-01-01 RX ADMIN — Medication 650 MILLIGRAM(S): at 12:25

## 2019-01-01 RX ADMIN — Medication 650 MILLIGRAM(S): at 06:48

## 2019-01-01 RX ADMIN — Medication 650 MILLIGRAM(S): at 23:38

## 2019-01-01 RX ADMIN — Medication 650 MILLIGRAM(S): at 17:41

## 2019-01-01 RX ADMIN — Medication 650 MILLIGRAM(S): at 07:40

## 2019-01-01 RX ADMIN — SENNA PLUS 2 TABLET(S): 8.6 TABLET ORAL at 00:33

## 2019-01-01 RX ADMIN — Medication 250 MILLIGRAM(S): at 07:37

## 2019-01-01 RX ADMIN — Medication 250 MILLIGRAM(S): at 00:33

## 2019-01-01 RX ADMIN — Medication 250 MILLIGRAM(S): at 21:44

## 2019-01-01 RX ADMIN — Medication 650 MILLIGRAM(S): at 13:40

## 2019-01-01 RX ADMIN — LIDOCAINE 1 PATCH: 4 CREAM TOPICAL at 19:00

## 2019-01-02 ENCOUNTER — TRANSCRIPTION ENCOUNTER (OUTPATIENT)
Age: 84
End: 2019-01-02

## 2019-01-02 LAB
ANION GAP SERPL CALC-SCNC: 7 MMOL/L — SIGNIFICANT CHANGE UP (ref 5–17)
BUN SERPL-MCNC: 19 MG/DL — SIGNIFICANT CHANGE UP (ref 8–20)
CALCIUM SERPL-MCNC: 10.3 MG/DL — HIGH (ref 8.6–10.2)
CHLORIDE SERPL-SCNC: 104 MMOL/L — SIGNIFICANT CHANGE UP (ref 98–107)
CO2 SERPL-SCNC: 23 MMOL/L — SIGNIFICANT CHANGE UP (ref 22–29)
CREAT SERPL-MCNC: 1.12 MG/DL — SIGNIFICANT CHANGE UP (ref 0.5–1.3)
GLUCOSE SERPL-MCNC: 92 MG/DL — SIGNIFICANT CHANGE UP (ref 70–115)
MAGNESIUM SERPL-MCNC: 2 MG/DL — SIGNIFICANT CHANGE UP (ref 1.8–2.6)
POTASSIUM SERPL-MCNC: 4.5 MMOL/L — SIGNIFICANT CHANGE UP (ref 3.5–5.3)
POTASSIUM SERPL-SCNC: 4.5 MMOL/L — SIGNIFICANT CHANGE UP (ref 3.5–5.3)
SODIUM SERPL-SCNC: 134 MMOL/L — LOW (ref 135–145)

## 2019-01-02 PROCEDURE — 99232 SBSQ HOSP IP/OBS MODERATE 35: CPT

## 2019-01-02 PROCEDURE — 93306 TTE W/DOPPLER COMPLETE: CPT | Mod: 26

## 2019-01-02 RX ADMIN — Medication 650 MILLIGRAM(S): at 00:30

## 2019-01-02 RX ADMIN — Medication 81 MILLIGRAM(S): at 13:24

## 2019-01-02 RX ADMIN — Medication 650 MILLIGRAM(S): at 18:55

## 2019-01-02 RX ADMIN — SENNA PLUS 2 TABLET(S): 8.6 TABLET ORAL at 21:13

## 2019-01-02 RX ADMIN — Medication 250 MILLIGRAM(S): at 06:17

## 2019-01-02 RX ADMIN — Medication 650 MILLIGRAM(S): at 07:15

## 2019-01-02 RX ADMIN — Medication 650 MILLIGRAM(S): at 18:25

## 2019-01-02 RX ADMIN — Medication 100 MILLIGRAM(S): at 13:24

## 2019-01-02 RX ADMIN — Medication 100 MILLIGRAM(S): at 06:17

## 2019-01-02 RX ADMIN — Medication 88 MICROGRAM(S): at 06:17

## 2019-01-02 RX ADMIN — LIDOCAINE 1 PATCH: 4 CREAM TOPICAL at 18:30

## 2019-01-02 RX ADMIN — HEPARIN SODIUM 5000 UNIT(S): 5000 INJECTION INTRAVENOUS; SUBCUTANEOUS at 21:13

## 2019-01-02 RX ADMIN — Medication 650 MILLIGRAM(S): at 13:24

## 2019-01-02 RX ADMIN — Medication 650 MILLIGRAM(S): at 23:53

## 2019-01-02 RX ADMIN — LIDOCAINE 1 PATCH: 4 CREAM TOPICAL at 00:00

## 2019-01-02 RX ADMIN — Medication 250 MILLIGRAM(S): at 13:24

## 2019-01-02 RX ADMIN — HEPARIN SODIUM 5000 UNIT(S): 5000 INJECTION INTRAVENOUS; SUBCUTANEOUS at 06:17

## 2019-01-02 RX ADMIN — Medication 650 MILLIGRAM(S): at 06:17

## 2019-01-02 RX ADMIN — Medication 650 MILLIGRAM(S): at 14:00

## 2019-01-02 RX ADMIN — Medication 100 MILLIGRAM(S): at 21:12

## 2019-01-02 RX ADMIN — Medication 250 MILLIGRAM(S): at 21:12

## 2019-01-02 RX ADMIN — ATORVASTATIN CALCIUM 10 MILLIGRAM(S): 80 TABLET, FILM COATED ORAL at 21:12

## 2019-01-02 RX ADMIN — LIDOCAINE 1 PATCH: 4 CREAM TOPICAL at 13:27

## 2019-01-02 RX ADMIN — HEPARIN SODIUM 5000 UNIT(S): 5000 INJECTION INTRAVENOUS; SUBCUTANEOUS at 13:29

## 2019-01-02 NOTE — DISCHARGE NOTE ADULT - MEDICATION SUMMARY - MEDICATIONS TO TAKE
I will START or STAY ON the medications listed below when I get home from the hospital:    Josue Aspirin Regimen 81 mg oral delayed release tablet  -- 1 tab(s) by mouth once a day  -- Indication: For Hypertension    acetaminophen 325 mg oral tablet  -- 2 tab(s) by mouth every 6 hours  -- Indication: For Closed fracture of one rib    atorvastatin 10 mg oral tablet  -- 1 tab(s) by mouth once a day  -- Indication: For Hypertension    amLODIPine-benazepril 5 mg-20 mg oral capsule  -- 1 cap(s) by mouth once a day  -- Indication: For Hypertension    docusate sodium 100 mg oral capsule  -- 1 cap(s) by mouth 3 times a day  -- Indication: For Hypertension    saccharomyces boulardii lyo 250 mg oral capsule  -- 2 cap(s) by mouth 2 times a day  -- Indication: For Hypertension    Synthroid 88 mcg (0.088 mg) oral tablet  -- 1 tab(s) by mouth once a day  -- Indication: For Hypothyroid

## 2019-01-02 NOTE — DISCHARGE NOTE ADULT - PROVIDER TOKENS
TOKEN:'[id number]',FREE:[LAST:[Acute Care Surgery],PHONE:[phone]],FAX:[(   [phone]    -],ADDRESS:[[address] [place] [address]]],TOKEN:'[id number]'

## 2019-01-02 NOTE — CHART NOTE - NSCHARTNOTEFT_GEN_A_CORE
Palliative care social work note. Palliative called to address completing MOLST since patient had directives from home. SW reviewed chart and no documents from home on record. MOLST chart on chart which specifies request for full code as signed by patient and witnessed by dgt Cindy. MOLST signed by attending indicating CPR .  Sw left message for dgt Cindy to address further and confered with Palliative team.

## 2019-01-02 NOTE — PROGRESS NOTE ADULT - PROBLEM SELECTOR PLAN 1
-PIC protocol  -continue diet  -f/u cardiology  -monitor HR  -continue home medications  -bowel regimen  -dvt ppx
-PIC protocol  -encourage diet  -continue cefalexin for UTI  -continue PT  -dispo: KATE  -dvt ppx

## 2019-01-02 NOTE — DISCHARGE NOTE ADULT - CARE PROVIDER_API CALL
Florencio Noyola (MD), Cardiac Electrophysiology; Cardiovascular Disease  [address] [place] [address]  Phone: [phone]  Fax: [phone]    Acute Care Surgery,   [address] [place] [address]  Phone: [phone]  Fax: (   )    -    Gopal Atkinson (DO), Cardiovascular Disease; Internal Medicine  [address] [place] [address]  Phone: [phone]  Fax: [phone]

## 2019-01-02 NOTE — DISCHARGE NOTE ADULT - CARE PLAN
Principal Discharge DX:	Rib fractures  Goal:	Continued fracture healing  Assessment and plan of treatment:	Please call and make an appointment with [facility] within 7-10 days.  Please call and make an appointment with [facility] in 10-14 days after discharge. Also, please call and make an appointment with your primary care physician as per your usual schedule.   Activity: Continued rehab at [facility].  Continue pulmonary exercises with incentive spirometry, deep breathing and coughing exercises  Diet: May continue Healthy Heart diet.  Medications: Please take all home medications as prescribed by your primary care doctor. Pain medication has been prescribed for you. Please, take it as it has been prescribed, do not drive or operate heavy machinery while taking narcotics.       If confusion, altered mental status, fever, chest pain, shortness of breath, severe or worsening pain, vomiting, change or worsening of medical status, please come back to the emergency room, and in case of emergency call [phone]  Secondary Diagnosis:	Syncope  Goal:	Continued medical management  Assessment and plan of treatment:	Please call and make an appointment with [facility] within 7-10 days.  Please call and make an appointment with [facility] in 10-14 days after discharge. Also, please call and make an appointment with your primary care physician as per your usual schedule.   Activity: Continued rehab at [facility].  Continue pulmonary exercises with incentive spirometry, deep breathing and coughing exercises  Diet: May continue Healthy Heart diet.  Medications: Please take all home medications as prescribed by your primary care doctor. Pain medication has been prescribed for you. Please, take it as it has been prescribed, do not drive or operate heavy machinery while taking narcotics.       If confusion, altered mental status, fever, chest pain, shortness of breath, severe or worsening pain, vomiting, change or worsening of medical status, please come back to the emergency room, and in case of emergency call 911  Secondary Diagnosis:	Hypertension  Goal:	Continued medical management  Assessment and plan of treatment:	Please call and make an appointment with [facility] within 7-10 days.  Please call and make an appointment with [facility] in 10-14 days after discharge. Also, please call and make an appointment with your primary care physician as per your usual schedule.   Activity: Continued rehab at [facility].  Continue pulmonary exercises with incentive spirometry, deep breathing and coughing exercises  Diet: May continue Healthy Heart diet.  Medications: Please take all home medications as prescribed by your primary care doctor. Pain medication has been prescribed for you. Please, take it as it has been prescribed, do not drive or operate heavy machinery while taking narcotics.       If confusion, altered mental status, fever, chest pain, shortness of breath, severe or worsening pain, vomiting, change or worsening of medical status, please come back to the emergency room, and in case of emergency call 911  Secondary Diagnosis:	Hypothyroid  Goal:	Continued medical management  Assessment and plan of treatment:	Please call and make an appointment with [facility] within 7-10 days.  Please call and make an appointment with [facility] in 10-14 days after discharge. Also, please call and make an appointment with your primary care physician as per your usual schedule.   Activity: Continued rehab at [facility].  Continue pulmonary exercises with incentive spirometry, deep breathing and coughing exercises  Diet: May continue Healthy Heart diet.  Medications: Please take all home medications as prescribed by your primary care doctor. Pain medication has been prescribed for you. Please, take it as it has been prescribed, do not drive or operate heavy machinery while taking narcotics.       If confusion, altered mental status, fever, chest pain, shortness of breath, severe or worsening pain, vomiting, change or worsening of medical status, please come back to the emergency room, and in case of emergency call 911

## 2019-01-02 NOTE — DISCHARGE NOTE ADULT - PLAN OF CARE
Continued fracture healing Please call and make an appointment with [facility] within 7-10 days.  Please call and make an appointment with [facility] in 10-14 days after discharge. Also, please call and make an appointment with your primary care physician as per your usual schedule.   Activity: Continued rehab at [facility].  Continue pulmonary exercises with incentive spirometry, deep breathing and coughing exercises  Diet: May continue Healthy Heart diet.  Medications: Please take all home medications as prescribed by your primary care doctor. Pain medication has been prescribed for you. Please, take it as it has been prescribed, do not drive or operate heavy machinery while taking narcotics.       If confusion, altered mental status, fever, chest pain, shortness of breath, severe or worsening pain, vomiting, change or worsening of medical status, please come back to the emergency room, and in case of emergency call [phone] Continued medical management Please call and make an appointment with [facility] within 7-10 days.  Please call and make an appointment with [facility] in 10-14 days after discharge. Also, please call and make an appointment with your primary care physician as per your usual schedule.   Activity: Continued rehab at [facility].  Continue pulmonary exercises with incentive spirometry, deep breathing and coughing exercises  Diet: May continue Healthy Heart diet.  Medications: Please take all home medications as prescribed by your primary care doctor. Pain medication has been prescribed for you. Please, take it as it has been prescribed, do not drive or operate heavy machinery while taking narcotics.       If confusion, altered mental status, fever, chest pain, shortness of breath, severe or worsening pain, vomiting, change or worsening of medical status, please come back to the emergency room, and in case of emergency call [phone] Please call and make an appointment with [facility] within 7-10 days.  Please call and make an appointment with [facility] in 10-14 days after discharge. Also, please call and make an appointment with your primary care physician as per your usual schedule.   Activity: Continued rehab at [facility].  Continue pulmonary exercises with incentive spirometry, deep breathing and coughing exercises  Diet: May continue Healthy Heart diet.  Medications: Please take all home medications as prescribed by your primary care doctor. Pain medication has been prescribed for you. Please, take it as it has been prescribed, do not drive or operate heavy machinery while taking narcotics.       If confusion, altered mental status, fever, chest pain, shortness of breath, severe or worsening pain, vomiting, change or worsening of medical status, please come back to the emergency room, and in case of emergency call [phone] Please call and make an appointment with [facility] within 7-10 days.  Please call and make an appointment with [facility] in 10-14 days after discharge. Also, please call and make an appointment with your primary care physician as per your usual schedule.   Activity: Continued rehab at [facility].  Continue pulmonary exercises with incentive spirometry, deep breathing and coughing exercises  Diet: May continue Healthy Heart diet.  Medications: Please take all home medications as prescribed by your primary care doctor. Pain medication has been prescribed for you. Please, take it as it has been prescribed, do not drive or operate heavy machinery while taking narcotics.       If confusion, altered mental status, fever, chest pain, shortness of breath, severe or worsening pain, vomiting, change or worsening of medical status, please come back to the emergency room, and in case of emergency call [phone]

## 2019-01-02 NOTE — DISCHARGE NOTE ADULT - HOSPITAL COURSE
85 year old female with PMH of Hypercholesterolemia, hypertension, hypothyroid BIBEMs after an unwitnessed fall this morning while trying to use the restroom. Patient reports her legs became weak and she fell while hitting the left side of her body on the vanity. No loss of consciousness, but did not attempt to ambulate after the fall. Daughter found patient on the bathroom floor shortly after the incident. Complains of left sided chest wall pain, but no shortness of breath.  CT imaging revealed buckling fracture of the left fifth and sixth ribs anteriorly.  Pt admitted for pulmonary toilet and observation with PIC protocol in place and syncope work-up.  Pt was placed on telemetry monitor and found to have asymptomatic bradycardia with rate drifting to low 40 bpm.  Cardiology consulted along with electrophysiology and an loop recorder was implanted with no complications during procedure.  Cardiology states that patient is to return to follow-up in 7-10 days for re-evaluation  Upon admission, pt was noted to have UTI on UA.  Pt started on Keflex for treatment.  While admitted, pt pain has been controlled, able to clear airway with strong cough, and improving with incentive spirometry.  Pt evaluated by PT and PM&R and recommended for [facility].  Pt with stable vitals, afebrile, tolerating diet, and voiding.  Pt without shortness of breath and pain controlled.  Pt stable for DC to [facility] with outpatient follow-up 85 year old female with PMH of Hypercholesterolemia, hypertension, hypothyroid BIBEMs after an unwitnessed fall this morning while trying to use the restroom. Patient reports her legs became weak and she fell while hitting the left side of her body on the vanity. No loss of consciousness, but did not attempt to ambulate after the fall. Daughter found patient on the bathroom floor shortly after the incident. Complains of left sided chest wall pain, but no shortness of breath.  CT imaging revealed buckling fracture of the left fifth and sixth ribs anteriorly.  Pt admitted for pulmonary toilet and observation with PIC protocol in place and syncope work-up.  Pt was placed on telemetry monitor and found to have asymptomatic bradycardia with rate drifting to low 40 bpm.  Cardiology consulted along with electrophysiology and an loop recorder was implanted with no complications during procedure.  Cardiology states that patient is to return to follow-up in 7-10 days for re-evaluation.  ECHO performed on 1/2 with 60-65% EF.  Upon admission, pt was noted to have UTI on UA.  Pt started on Keflex for treatment.  While admitted, pt pain has been controlled, able to clear airway with strong cough, and improving with incentive spirometry.  Pt evaluated by PT and PM&R and recommended for [facility].  Pt with stable vitals, afebrile, tolerating diet, and voiding.  Pt without shortness of breath and pain controlled.  Pt stable for DC to [facility] with outpatient follow-up

## 2019-01-02 NOTE — DISCHARGE NOTE ADULT - PATIENT PORTAL LINK FT
You can access the [facility] Patient Portal, offered by [facility], by registering with the following website: [email or web address]

## 2019-01-03 VITALS
OXYGEN SATURATION: 98 % | DIASTOLIC BLOOD PRESSURE: 74 MMHG | RESPIRATION RATE: 18 BRPM | TEMPERATURE: 98 F | SYSTOLIC BLOOD PRESSURE: 139 MMHG | HEART RATE: 60 BPM

## 2019-01-03 LAB
ANION GAP SERPL CALC-SCNC: 7 MMOL/L — SIGNIFICANT CHANGE UP (ref 5–17)
BUN SERPL-MCNC: 20 MG/DL — SIGNIFICANT CHANGE UP (ref 8–20)
CALCIUM SERPL-MCNC: 10.9 MG/DL — HIGH (ref 8.6–10.2)
CHLORIDE SERPL-SCNC: 104 MMOL/L — SIGNIFICANT CHANGE UP (ref 98–107)
CO2 SERPL-SCNC: 24 MMOL/L — SIGNIFICANT CHANGE UP (ref 22–29)
CREAT SERPL-MCNC: 0.99 MG/DL — SIGNIFICANT CHANGE UP (ref 0.5–1.3)
GLUCOSE SERPL-MCNC: 94 MG/DL — SIGNIFICANT CHANGE UP (ref 70–115)
MAGNESIUM SERPL-MCNC: 2.1 MG/DL — SIGNIFICANT CHANGE UP (ref 1.6–2.6)
PHOSPHATE SERPL-MCNC: 2.2 MG/DL — LOW (ref 2.4–4.7)
POTASSIUM SERPL-MCNC: 4.4 MMOL/L — SIGNIFICANT CHANGE UP (ref 3.5–5.3)
POTASSIUM SERPL-SCNC: 4.4 MMOL/L — SIGNIFICANT CHANGE UP (ref 3.5–5.3)
SODIUM SERPL-SCNC: 135 MMOL/L — SIGNIFICANT CHANGE UP (ref 135–145)

## 2019-01-03 PROCEDURE — 82962 GLUCOSE BLOOD TEST: CPT

## 2019-01-03 PROCEDURE — 97163 PT EVAL HIGH COMPLEX 45 MIN: CPT

## 2019-01-03 PROCEDURE — 99285 EMERGENCY DEPT VISIT HI MDM: CPT

## 2019-01-03 PROCEDURE — 97167 OT EVAL HIGH COMPLEX 60 MIN: CPT

## 2019-01-03 PROCEDURE — 83735 ASSAY OF MAGNESIUM: CPT

## 2019-01-03 PROCEDURE — 86901 BLOOD TYPING SEROLOGIC RH(D): CPT

## 2019-01-03 PROCEDURE — 81001 URINALYSIS AUTO W/SCOPE: CPT

## 2019-01-03 PROCEDURE — 85610 PROTHROMBIN TIME: CPT

## 2019-01-03 PROCEDURE — 72125 CT NECK SPINE W/O DYE: CPT

## 2019-01-03 PROCEDURE — 36415 COLL VENOUS BLD VENIPUNCTURE: CPT

## 2019-01-03 PROCEDURE — 97110 THERAPEUTIC EXERCISES: CPT

## 2019-01-03 PROCEDURE — 71250 CT THORAX DX C-: CPT

## 2019-01-03 PROCEDURE — 93005 ELECTROCARDIOGRAM TRACING: CPT

## 2019-01-03 PROCEDURE — 93306 TTE W/DOPPLER COMPLETE: CPT

## 2019-01-03 PROCEDURE — 84100 ASSAY OF PHOSPHORUS: CPT

## 2019-01-03 PROCEDURE — 97116 GAIT TRAINING THERAPY: CPT

## 2019-01-03 PROCEDURE — 97530 THERAPEUTIC ACTIVITIES: CPT

## 2019-01-03 PROCEDURE — 73521 X-RAY EXAM HIPS BI 2 VIEWS: CPT

## 2019-01-03 PROCEDURE — 70450 CT HEAD/BRAIN W/O DYE: CPT

## 2019-01-03 PROCEDURE — 97535 SELF CARE MNGMENT TRAINING: CPT

## 2019-01-03 PROCEDURE — 87086 URINE CULTURE/COLONY COUNT: CPT

## 2019-01-03 PROCEDURE — C1764: CPT

## 2019-01-03 PROCEDURE — 85730 THROMBOPLASTIN TIME PARTIAL: CPT

## 2019-01-03 PROCEDURE — 80053 COMPREHEN METABOLIC PANEL: CPT

## 2019-01-03 PROCEDURE — 87186 SC STD MICRODIL/AGAR DIL: CPT

## 2019-01-03 PROCEDURE — 85027 COMPLETE CBC AUTOMATED: CPT

## 2019-01-03 PROCEDURE — 86900 BLOOD TYPING SEROLOGIC ABO: CPT

## 2019-01-03 PROCEDURE — 80048 BASIC METABOLIC PNL TOTAL CA: CPT

## 2019-01-03 PROCEDURE — 86850 RBC ANTIBODY SCREEN: CPT

## 2019-01-03 RX ORDER — SODIUM,POTASSIUM PHOSPHATES 278-250MG
3 POWDER IN PACKET (EA) ORAL ONCE
Qty: 0 | Refills: 0 | Status: COMPLETED | OUTPATIENT
Start: 2019-01-03 | End: 2019-01-03

## 2019-01-03 RX ORDER — ACETAMINOPHEN 500 MG
2 TABLET ORAL
Qty: 0 | Refills: 0 | DISCHARGE
Start: 2019-01-03

## 2019-01-03 RX ORDER — DOCUSATE SODIUM 100 MG
1 CAPSULE ORAL
Qty: 0 | Refills: 0 | COMMUNITY
Start: 2019-01-03

## 2019-01-03 RX ADMIN — LIDOCAINE 1 PATCH: 4 CREAM TOPICAL at 01:00

## 2019-01-03 RX ADMIN — Medication 88 MICROGRAM(S): at 05:47

## 2019-01-03 RX ADMIN — LIDOCAINE 1 PATCH: 4 CREAM TOPICAL at 11:07

## 2019-01-03 RX ADMIN — Medication 81 MILLIGRAM(S): at 11:06

## 2019-01-03 RX ADMIN — HEPARIN SODIUM 5000 UNIT(S): 5000 INJECTION INTRAVENOUS; SUBCUTANEOUS at 05:47

## 2019-01-03 RX ADMIN — Medication 3 PACKET(S): at 12:01

## 2019-01-03 RX ADMIN — Medication 650 MILLIGRAM(S): at 00:53

## 2019-01-03 RX ADMIN — Medication 250 MILLIGRAM(S): at 05:46

## 2019-01-03 RX ADMIN — Medication 650 MILLIGRAM(S): at 11:06

## 2019-01-03 RX ADMIN — Medication 650 MILLIGRAM(S): at 12:05

## 2019-01-03 RX ADMIN — Medication 100 MILLIGRAM(S): at 05:47

## 2019-01-03 NOTE — PROGRESS NOTE ADULT - REASON FOR ADMISSION
rib fractures

## 2019-01-03 NOTE — PROGRESS NOTE ADULT - PROVIDER SPECIALTY LIST ADULT
Cardiology
Electrophysiology
Rehab Medicine
Trauma Surgery

## 2019-01-03 NOTE — CHART NOTE - NSCHARTNOTEFT_GEN_A_CORE
Palliative care social work note. SW met with patient t provide support and address goals of acre. Pt was engaged with Sw and explored planning for rehab at [facility]. pt lives with dgt and son in law previously. Pt acknowledged getting loop recorder placed and is hopeful with medical issues and cardiology monitoring. Pt remains Full Code on  MOLST.

## 2019-01-03 NOTE — PROGRESS NOTE ADULT - ASSESSMENT
85 year old female s/p mechanical fall found to have left sided rib fractures. symptomatic bradycardia s/p loop recorder 12/31
85 year old female s/p mechanical fall found to have left sided rib fractures. symptomatic bradycardia s/p loop recorder 12/31    -PIC protocol  -encourage diet  -continue cefalexin for UTI  -continue PT  -dispo: [facility]  -dvt ppx.
85 year old female s/p mechanical fall sustaining nondisplaced left 5th/6th rib fractures admitted for pain control    -Loop recorder placement done by cardiology, will follow up further recommendations  -continue diet  -continue home medications  -bowel regimen  -dvt ppx  -PIC protocol  -UTI being treated with Cefalexin 12/31-1/2  -PTOT/PMR
85 year old female s/p mechanical fall sustaining nondisplaced left 5th/6th rib fractures admitted for pain control  -Loop recorder placement by cardiology today  -continue diet  -monitor HR  -continue home medications  -bowel regimen  -dvt ppx.
85 year old female s/p mechanical fall sustaining nondisplaced left 5th/6th rib fractures admitted for pain control

## 2019-01-03 NOTE — PROGRESS NOTE ADULT - SUBJECTIVE AND OBJECTIVE BOX
HPI/OVERNIGHT EVENTS: Patient seen and examined at bedside this AM. No acute events overnight per nursing reports. Pain well controlled, voiding, full bowel function. Denies fever, chills, nausea, vomitting, chest pain, SOB, dizziness, abd pain or any other concerning symptoms    Vital Signs Last 24 Hrs  T(C): 36.6 (31 Dec 2018 08:05), Max: 36.9 (31 Dec 2018 05:03)  T(F): 97.9 (31 Dec 2018 08:05), Max: 98.5 (31 Dec 2018 05:03)  HR: 56 (31 Dec 2018 08:05) (48 - 66)  BP: 108/61 (31 Dec 2018 08:05) (101/62 - 131/68)  BP(mean): --  RR: 18 (31 Dec 2018 08:05) (18 - 18)  SpO2: 99% (31 Dec 2018 08:05) (95% - 99%)    I&O's Detail    30 Dec 2018 07:01  -  31 Dec 2018 07:00  --------------------------------------------------------  IN:    lactated ringers.: 975 mL    Oral Fluid: 500 mL  Total IN: 1475 mL    OUT:    Voided: 600 mL  Total OUT: 600 mL    Total NET: 875 mL          Physical Exam:  general: no acute distress  Respiratory: Breath Sounds equal & clear to auscultation, no accessory muscle use  Cardiovascular: Regular rate & rhythm, normal S1, S2; no murmurs, gallops or rubs  Gastrointestinal: Soft, non-tender, nondistended     LABS:                        12.1   8.6   )-----------( 165      ( 31 Dec 2018 06:47 )             37.4     12-31    133<L>  |  102  |  28.0<H>  ----------------------------<  101  4.6   |  22.0  |  1.29    Ca    10.3<H>      31 Dec 2018 06:47  Phos  2.0     12-31  Mg     1.9     12-31    TPro  7.4  /  Alb  3.7  /  TBili  0.6  /  DBili  x   /  AST  21  /  ALT  9   /  AlkPhos  70  12-30    PT/INR - ( 30 Dec 2018 06:55 )   PT: 13.3 sec;   INR: 1.15 ratio         PTT - ( 30 Dec 2018 06:55 )  PTT:46.0 sec      MEDICATIONS  (STANDING):  acetaminophen   Tablet .. 650 milliGRAM(s) Oral every 6 hours  amLODIPine   Tablet 5 milliGRAM(s) Oral daily  aspirin enteric coated 81 milliGRAM(s) Oral daily  atorvastatin 10 milliGRAM(s) Oral at bedtime  docusate sodium 100 milliGRAM(s) Oral three times a day  gabapentin 300 milliGRAM(s) Oral every 8 hours  heparin  Injectable 5000 Unit(s) SubCutaneous every 8 hours  lactated ringers. 1000 milliLiter(s) (75 mL/Hr) IV Continuous <Continuous>  levothyroxine 88 MICROGram(s) Oral daily  lidocaine   Patch 1 Patch Transdermal every 24 hours  lisinopril 20 milliGRAM(s) Oral daily  methocarbamol 750 milliGRAM(s) Oral every 6 hours  senna 2 Tablet(s) Oral at bedtime    MEDICATIONS  (PRN):      MICRO:   Cultures     STUDIES:   EKG, CXR, U/S, CT, MRI
HPI/OVERNIGHT EVENTS: Patient seen and examined at bedside this AM. No acute events overnight per nursing reports. Patient is voiding, has full bowel function, pain well controlled. Denies fever, chills, nausea, vomitting, chest pain, SOB, dizziness, abd pain or any other concerning symptoms    Vital Signs Last 24 Hrs  T(C): 36.5 (02 Jan 2019 23:13), Max: 37.3 (02 Jan 2019 21:07)  T(F): 97.7 (02 Jan 2019 23:13), Max: 99.2 (02 Jan 2019 21:07)  HR: 64 (02 Jan 2019 23:13) (52 - 66)  BP: 137/78 (02 Jan 2019 23:13) (117/64 - 140/63)  BP(mean): --  RR: 18 (02 Jan 2019 23:13) (18 - 18)  SpO2: 94% (02 Jan 2019 23:13) (94% - 99%)    I&O's Detail    01 Jan 2019 07:01  -  02 Jan 2019 07:00  --------------------------------------------------------  IN:    lactated ringers.: 375 mL    Oral Fluid: 500 mL  Total IN: 875 mL    OUT:    Voided: 400 mL  Total OUT: 400 mL    Total NET: 475 mL          Physical Exam:    general: no acute distress, AOx3  Respiratory: Breath Sounds equal & clear to auscultation, no accessory muscle use  Cardiovascular: Regular rate & rhythm, normal S1, S2; no murmurs, gallops or rubs  Gastrointestinal: Soft, non-tender, nondistended  Extremities: No peripheral edema, No cyanosis, clubbing   Vascular: Equal and normal pulses: 2+ peripheral pulses throughout  Musculoskeletal: No joint pain, swelling or deformity; no limitation of movement  Skin: No rashes    LABS:                        12.8   8.0   )-----------( 182      ( 01 Jan 2019 06:46 )             39.6     01-02    134<L>  |  104  |  19.0  ----------------------------<  92  4.5   |  23.0  |  1.12    Ca    10.3<H>      02 Jan 2019 07:17  Phos  2.5     01-01  Mg     2.0     01-02            MEDICATIONS  (STANDING):  acetaminophen   Tablet .. 650 milliGRAM(s) Oral every 6 hours  aspirin enteric coated 81 milliGRAM(s) Oral daily  atorvastatin 10 milliGRAM(s) Oral at bedtime  cephalexin 250 milliGRAM(s) Oral every 8 hours  docusate sodium 100 milliGRAM(s) Oral three times a day  heparin  Injectable 5000 Unit(s) SubCutaneous every 8 hours  levothyroxine 88 MICROGram(s) Oral daily  lidocaine   Patch 1 Patch Transdermal every 24 hours  senna 2 Tablet(s) Oral at bedtime    MEDICATIONS  (PRN):      MICRO:   Cultures     STUDIES:   EKG, CXR, U/S, CT, MRI
[facility]                                   [address] [place] [address]                                                      PHONE: [phone]                                                         FAX: [phone]  [email or web address]  ---------------------------------------------------------------------------------------------------------------------------------    Overnight events/patient complaints:  Pt feels well but tired    penicillins (Unknown)    MEDICATIONS  (STANDING):  acetaminophen   Tablet .. 650 milliGRAM(s) Oral every 6 hours  aspirin enteric coated 81 milliGRAM(s) Oral daily  atorvastatin 10 milliGRAM(s) Oral at bedtime  docusate sodium 100 milliGRAM(s) Oral three times a day  gabapentin 300 milliGRAM(s) Oral every 8 hours  heparin  Injectable 5000 Unit(s) SubCutaneous every 8 hours  lactated ringers Bolus 500 milliLiter(s) IV Bolus once  lactated ringers. 1000 milliLiter(s) (75 mL/Hr) IV Continuous <Continuous>  levothyroxine 88 MICROGram(s) Oral daily  lidocaine   Patch 1 Patch Transdermal every 24 hours  methocarbamol 750 milliGRAM(s) Oral every 6 hours  potassium phosphate IVPB 15 milliMole(s) IV Intermittent once  senna 2 Tablet(s) Oral at bedtime    MEDICATIONS  (PRN):      Vital Signs Last 24 Hrs  T(C): 36.6 (31 Dec 2018 08:05), Max: 36.9 (31 Dec 2018 05:03)  T(F): 97.9 (31 Dec 2018 08:05), Max: 98.5 (31 Dec 2018 05:03)  HR: 56 (31 Dec 2018 08:05) (56 - 66)  BP: 108/61 (31 Dec 2018 08:05) (101/62 - 131/68)  BP(mean): --  RR: 18 (31 Dec 2018 08:05) (18 - 18)  SpO2: 99% (31 Dec 2018 08:05) (95% - 99%)  ICU Vital Signs Last 24 Hrs  YADIRA LEVI  I&O's Detail    30 Dec 2018 07:01  -  31 Dec 2018 07:00  --------------------------------------------------------  IN:    lactated ringers.: 975 mL    Oral Fluid: 500 mL  Total IN: 1475 mL    OUT:    Voided: 600 mL  Total OUT: 600 mL    Total NET: 875 mL        Drug Dosing Weight  YADIRA LEVI      PHYSICAL EXAM:  General: Appears well developed, well nourished alert and cooperative.  HEENT: Head; normocephalic, atraumatic.  Eyes: Pupils reactive, cornea wnl.  Neck: Supple, no nodes adenopathy, no NVD or carotid bruit or thyromegaly.  CARDIOVASCULAR: Normal S1 and S2, No murmur, rub, gallop or lift.   LUNGS: No rales, rhonchi or wheeze. Normal breath sounds bilaterally.  ABDOMEN: Soft, nontender without mass or organomegaly. bowel sounds normoactive.  EXTREMITIES: No clubbing, cyanosis or edema. Distal pulses wnl.   SKIN: warm and dry with normal turgor.  NEURO: Alert/oriented x 3/normal motor exam. No pathologic reflexes.    PSYCH: normal affect.        LABS:                        12.1   8.6   )-----------( 165      ( 31 Dec 2018 06:47 )             37.4     12-31    133<L>  |  102  |  28.0<H>  ----------------------------<  101  4.6   |  22.0  |  1.29    Ca    10.3<H>      31 Dec 2018 06:47  Phos  2.0     12-31  Mg     1.9     12-31    TPro  7.4  /  Alb  3.7  /  TBili  0.6  /  DBili  x   /  AST  21  /  ALT  9   /  AlkPhos  70  12-30    YADIRA LEVI      PT/INR - ( 30 Dec 2018 06:55 )   PT: 13.3 sec;   INR: 1.15 ratio         PTT - ( 30 Dec 2018 06:55 )  PTT:46.0 sec      RADIOLOGY & ADDITIONAL STUDIES:    INTERPRETATION OF TELEMETRY (personally reviewed): Sinus stepan no pauses       ECHO: pending         ASSESSMENT AND PLAN:  In summary, YADIRA LEVI is an 85y Female with past medical history significant for HTN, frequent falls aw fall at home which she denies syncope and subsequent rib fractures. This AM had and episode of hypotension after BP meds and being dehydrated.      1) Discussed with daughters at bedside and with patient  2) IV fluids given and will proceed with ILR
Pt s/p uncomplicated loop recorder implantation.  See report for details.  OK to remove opsite dressing in AM, leave steristrips intact.  Ok to shower in AM.  Will arrange outpt f/u for wound check 7-10 days.  Please call if further questions or cardiac issues.    Thanks  Florencio Noyola MD
Reconsulted for PMR.   Patient in bed, transferred OOB to chair with RN.  Performed fairly well, limited by fatigue.  Reports some discomfort in the chest.  Feels weak in the legs.     Patient is s/p loop recorder placement.     FUNCTIONAL PROGRESS  1/1  Bed Mobility  Bed Mobility Training Supine-to-Sit: minimum assist (75% patient effort);  1 person assist  Bed Mobility Training Limitations: decreased ability to use arms for pushing/pulling;  decreased ability to use legs for bridging/pushing;  impaired ability to control trunk for mobility;  decreased strength;  impaired balance    Sit-Stand Transfer Training  Transfer Training Sit-to-Stand Transfer: minimum assist (75% patient effort);  1 person assist;  full weight-bearing   rolling walker  Transfer Training Stand-to-Sit Transfer: minimum assist (75% patient effort);  1 person assist  Sit-to-Stand Transfer Training Transfer Safety Analysis: decreased balance;  impaired balance    Gait Training  Gait Training: minimum assist (75% patient effort);  1 person assist;  full weight-bearing   rolling walker;  50 feet;  x2    Stair Training  Physical Assist/Nonphysical Assist: mod assist x1  Weight-Bearing Restrictions: full weight-bearing  Assistive Device: right rail up;  right rail down  Number of Stairs: 3       REVIEW OF SYSTEMS  Constitutional - No fever,  +fatigue  HEENT - No vertigo, No neck pain  Neurological - No headaches, No memory loss, +loss of strength, No numbness, No tremors  Skin - No rashes, No lesions   Musculoskeletal - + joint pain, No joint swelling, +muscle pain  Psychiatric - No depression, No anxiety    VITALS  T(C): 36.7 (01-02-19 @ 08:07), Max: 37 (01-01-19 @ 23:40)  HR: 56 (01-02-19 @ 08:07) (56 - 72)  BP: 117/64 (01-02-19 @ 08:07) (117/64 - 138/66)  RR: 18 (01-02-19 @ 08:07) (18 - 18)  SpO2: 95% (01-02-19 @ 08:07) (93% - 97%)  Wt(kg): --    MEDICATIONS   acetaminophen   Tablet .. 650 milliGRAM(s) every 6 hours  aspirin enteric coated 81 milliGRAM(s) daily  atorvastatin 10 milliGRAM(s) at bedtime  cephalexin 250 milliGRAM(s) every 8 hours  docusate sodium 100 milliGRAM(s) three times a day  heparin  Injectable 5000 Unit(s) every 8 hours  levothyroxine 88 MICROGram(s) daily  lidocaine   Patch 1 Patch every 24 hours  senna 2 Tablet(s) at bedtime      RECENT LABS/IMAGING  CBC Full  -  ( 01 Jan 2019 06:46 )  WBC Count : 8.0 K/uL  Hemoglobin : 12.8 g/dL  Hematocrit : 39.6 %  Platelet Count - Automated : 182 K/uL  Mean Cell Volume : 97.3 fl  Mean Cell Hemoglobin : 31.4 pg  Mean Cell Hemoglobin Concentration : 32.3 g/dL  Auto Neutrophil # : x  Auto Lymphocyte # : x  Auto Monocyte # : x  Auto Eosinophil # : x  Auto Basophil # : x  Auto Neutrophil % : x  Auto Lymphocyte % : x  Auto Monocyte % : x  Auto Eosinophil % : x  Auto Basophil % : x    01-02    134<L>  |  104  |  19.0  ----------------------------<  92  4.5   |  23.0  |  1.12    Ca    10.3<H>      02 Jan 2019 07:17  Phos  2.5     01-01  Mg     2.0     01-02      -----------------------------------------------------------------------------------  PHYSICAL EXAM  Constitutional - NAD, Comfortable  Extremities - No C/C/E, No calf tenderness   Neurologic Exam -                    Cognitive - Awake, Alert, AAO to self, place, date, year, situation     Motor - No focal deficits     Sensory - Intact to LT  Psychiatric - Mood stable, Affect WNL  ----------------------------------------------------------------------------------------  ASSESSMENT/PLAN  85yFemale with functional deficits after a mechanical fall sustaining rib fractures now s/p loop recorder  Pain - Tylenol, Neurontin, Lidoderm, Robaxin  CAD - ASA, Lipitor  HTN - Norvasc  DVT PPX - SCDs, Heparin  Rehab - Family and patient want a short course of KATE.  Recommend KATE, patient DOES NOT meet acute inpatient rehabilitation criteria. Will sign off, please reconsult if needed for rehab dispo recommendations.
SUBJECTIVE: CLEO overnight. Pain is well controlled. Denies issues with breathing. Tolerating a regular diet. Working with PT.       MEDICATIONS  (STANDING):  acetaminophen   Tablet .. 650 milliGRAM(s) Oral every 6 hours  aspirin enteric coated 81 milliGRAM(s) Oral daily  atorvastatin 10 milliGRAM(s) Oral at bedtime  cephalexin 250 milliGRAM(s) Oral every 8 hours  docusate sodium 100 milliGRAM(s) Oral three times a day  heparin  Injectable 5000 Unit(s) SubCutaneous every 8 hours  lactated ringers. 1000 milliLiter(s) (75 mL/Hr) IV Continuous <Continuous>  levothyroxine 88 MICROGram(s) Oral daily  lidocaine   Patch 1 Patch Transdermal every 24 hours  senna 2 Tablet(s) Oral at bedtime    MEDICATIONS  (PRN):      Vital Signs Last 24 Hrs  T(C): 36.9 (01 Jan 2019 08:37), Max: 36.9 (31 Dec 2018 22:21)  T(F): 98.4 (01 Jan 2019 08:37), Max: 98.5 (31 Dec 2018 22:21)  HR: 60 (01 Jan 2019 08:37) (50 - 93)  BP: 140/61 (01 Jan 2019 08:37) (114/62 - 149/67)  BP(mean): --  RR: 18 (01 Jan 2019 08:37) (14 - 20)  SpO2: 95% (01 Jan 2019 08:37) (95% - 98%)    PE  general: no acute distress  Respiratory: Breath Sounds equal & clear to auscultation, no accessory muscle use  Cardiovascular: Regular rate & rhythm, normal S1, S2; no murmurs, gallops or rubs  Gastrointestinal: Soft, non-tender, nondistended         I&O's Detail    31 Dec 2018 07:01  -  01 Jan 2019 07:00  --------------------------------------------------------  IN:    lactated ringers.: 900 mL    Oral Fluid: 480 mL  Total IN: 1380 mL    OUT:  Total OUT: 0 mL    Total NET: 1380 mL          LABS:                        12.1   8.6   )-----------( 165      ( 31 Dec 2018 06:47 )             37.4     01-01    133<L>  |  100  |  24.0<H>  ----------------------------<  91  5.2   |  24.0  |  1.18    Ca    10.5<H>      01 Jan 2019 06:46  Phos  2.5     01-01  Mg     1.8     01-01            RADIOLOGY & ADDITIONAL STUDIES:
no acute events overnight. pain is controlled. tolerating diet. ambulating with physical therapy  denies fevers, chills, nausea, vomiting, chest pain, shortness of breath, loss of consciousness      MEDICATIONS  (STANDING):  acetaminophen   Tablet .. 650 milliGRAM(s) Oral every 6 hours  aspirin enteric coated 81 milliGRAM(s) Oral daily  atorvastatin 10 milliGRAM(s) Oral at bedtime  cephalexin 250 milliGRAM(s) Oral every 8 hours  docusate sodium 100 milliGRAM(s) Oral three times a day  heparin  Injectable 5000 Unit(s) SubCutaneous every 8 hours  levothyroxine 88 MICROGram(s) Oral daily  lidocaine   Patch 1 Patch Transdermal every 24 hours  senna 2 Tablet(s) Oral at bedtime    MEDICATIONS  (PRN):      Vital Signs Last 24 Hrs  T(C): 36.8 (02 Jan 2019 06:00), Max: 37 (01 Jan 2019 23:40)  T(F): 98.2 (02 Jan 2019 06:00), Max: 98.6 (01 Jan 2019 23:40)  HR: 66 (02 Jan 2019 06:00) (56 - 72)  BP: 128/64 (02 Jan 2019 06:00) (128/64 - 140/61)  BP(mean): --  RR: 18 (02 Jan 2019 06:00) (18 - 18)  SpO2: 94% (02 Jan 2019 06:00) (93% - 97%)    Physical Exam:    general: no acute distress, AOx3  Respiratory: Breath Sounds equal & clear to auscultation, no accessory muscle use  Cardiovascular: Regular rate & rhythm, normal S1, S2; no murmurs, gallops or rubs  Gastrointestinal: Soft, non-tender, nondistended  Extremities: No peripheral edema, No cyanosis, clubbing   Vascular: Equal and normal pulses: 2+ peripheral pulses throughout  Musculoskeletal: No joint pain, swelling or deformity; no limitation of movement  Skin: No rashes      I&O's Detail    31 Dec 2018 07:01  -  01 Jan 2019 07:00  --------------------------------------------------------  IN:    lactated ringers.: 900 mL    Oral Fluid: 480 mL  Total IN: 1380 mL    OUT:  Total OUT: 0 mL    Total NET: 1380 mL      01 Jan 2019 07:01  -  02 Jan 2019 06:35  --------------------------------------------------------  IN:    lactated ringers.: 375 mL    Oral Fluid: 500 mL  Total IN: 875 mL    OUT:    Voided: 400 mL  Total OUT: 400 mL    Total NET: 475 mL          LABS:                        12.8   8.0   )-----------( 182      ( 01 Jan 2019 06:46 )             39.6     01-01    133<L>  |  100  |  24.0<H>  ----------------------------<  91  5.2   |  24.0  |  1.18    Ca    10.5<H>      01 Jan 2019 06:46  Phos  2.5     01-01  Mg     1.8     01-01            RADIOLOGY & ADDITIONAL STUDIES:
patient with asymptomatic bradycardia overnight down to 40s. EKG performed and no changed compared to admission EKG. patient states left rib pain is better controlled. tolerated diet.   denies fevers, chills, chest pain, shortness of breath, nausea, vomiting, dysuria      MEDICATIONS  (STANDING):  acetaminophen   Tablet .. 650 milliGRAM(s) Oral every 6 hours  amLODIPine   Tablet 5 milliGRAM(s) Oral daily  aspirin enteric coated 81 milliGRAM(s) Oral daily  atorvastatin 10 milliGRAM(s) Oral at bedtime  docusate sodium 100 milliGRAM(s) Oral three times a day  gabapentin 300 milliGRAM(s) Oral every 8 hours  heparin  Injectable 5000 Unit(s) SubCutaneous every 8 hours  levothyroxine 88 MICROGram(s) Oral daily  lidocaine   Patch 1 Patch Transdermal every 24 hours  lisinopril 20 milliGRAM(s) Oral daily  methocarbamol 750 milliGRAM(s) Oral every 6 hours  senna 2 Tablet(s) Oral at bedtime    MEDICATIONS  (PRN):      Vital Signs Last 24 Hrs  T(C): 36.2 (30 Dec 2018 00:20), Max: 36.7 (29 Dec 2018 11:54)  T(F): 97.1 (30 Dec 2018 00:20), Max: 98.1 (29 Dec 2018 11:54)  HR: 47 (30 Dec 2018 00:20) (47 - 60)  BP: 129/62 (30 Dec 2018 00:20) (117/69 - 144/76)  BP(mean): 98 (29 Dec 2018 13:50) (98 - 98)  RR: 18 (30 Dec 2018 00:20) (16 - 19)  SpO2: 96% (30 Dec 2018 00:20) (92% - 99%)    Physical Exam:  general: no acute distress  Respiratory: Breath Sounds equal & clear to auscultation, no accessory muscle use  Cardiovascular: Regular rate & rhythm, normal S1, S2; no murmurs, gallops or rubs  Gastrointestinal: Soft, non-tender, nondistended        I&O's Detail    29 Dec 2018 07:01  -  30 Dec 2018 03:41  --------------------------------------------------------  IN:    Oral Fluid: 500 mL  Total IN: 500 mL    OUT:  Total OUT: 0 mL    Total NET: 500 mL          LABS:                        14.1   10.7  )-----------( 164      ( 29 Dec 2018 06:16 )             42.9         135  |  102  |  22.0<H>  ----------------------------<  127<H>  4.1   |  25.0  |  1.19    Ca    12.1<H>      29 Dec 2018 06:16    TPro  8.1  /  Alb  3.8  /  TBili  0.5  /  DBili  x   /  AST  24  /  ALT  11  /  AlkPhos  78      PT/INR - ( 29 Dec 2018 06:16 )   PT: 12.3 sec;   INR: 1.07 ratio         PTT - ( 29 Dec 2018 06:16 )  PTT:31.6 sec  Urinalysis Basic - ( 29 Dec 2018 06:15 )    Color: Yellow / Appearance: Clear / S.010 / pH: x  Gluc: x / Ketone: Negative  / Bili: Negative / Urobili: Negative mg/dL   Blood: x / Protein: 15 mg/dL / Nitrite: Positive   Leuk Esterase: Moderate / RBC: 3-5 /HPF / WBC 6-10   Sq Epi: x / Non Sq Epi: Occasional / Bacteria: Occasional        RADIOLOGY & ADDITIONAL STUDIES:

## 2019-03-21 ENCOUNTER — INPATIENT (INPATIENT)
Facility: HOSPITAL | Age: 84
LOS: 3 days | Discharge: HOME | DRG: 312 | End: 2019-03-25
Attending: INTERNAL MEDICINE | Admitting: HOSPITALIST
Payer: MEDICARE

## 2019-03-21 VITALS — WEIGHT: 179.9 LBS | HEIGHT: 64 IN

## 2019-03-21 DIAGNOSIS — R55 SYNCOPE AND COLLAPSE: ICD-10-CM

## 2019-03-21 DIAGNOSIS — R19.7 DIARRHEA, UNSPECIFIED: ICD-10-CM

## 2019-03-21 DIAGNOSIS — R79.89 OTHER SPECIFIED ABNORMAL FINDINGS OF BLOOD CHEMISTRY: ICD-10-CM

## 2019-03-21 DIAGNOSIS — I10 ESSENTIAL (PRIMARY) HYPERTENSION: ICD-10-CM

## 2019-03-21 LAB
ALBUMIN SERPL ELPH-MCNC: 4.1 G/DL — SIGNIFICANT CHANGE UP (ref 3.3–5.2)
ALP SERPL-CCNC: 81 U/L — SIGNIFICANT CHANGE UP (ref 40–120)
ALT FLD-CCNC: 9 U/L — SIGNIFICANT CHANGE UP
ANION GAP SERPL CALC-SCNC: 12 MMOL/L — SIGNIFICANT CHANGE UP (ref 5–17)
AST SERPL-CCNC: 25 U/L — SIGNIFICANT CHANGE UP
BASOPHILS # BLD AUTO: 0 K/UL — SIGNIFICANT CHANGE UP (ref 0–0.2)
BASOPHILS NFR BLD AUTO: 0.1 % — SIGNIFICANT CHANGE UP (ref 0–2)
BILIRUB SERPL-MCNC: 0.4 MG/DL — SIGNIFICANT CHANGE UP (ref 0.4–2)
BUN SERPL-MCNC: 29 MG/DL — HIGH (ref 8–20)
C DIFF BY PCR RESULT: DETECTED
C DIFF TOX GENS STL QL NAA+PROBE: SIGNIFICANT CHANGE UP
CALCIUM SERPL-MCNC: 12.8 MG/DL — HIGH (ref 8.6–10.2)
CHLORIDE SERPL-SCNC: 101 MMOL/L — SIGNIFICANT CHANGE UP (ref 98–107)
CK SERPL-CCNC: 90 U/L — SIGNIFICANT CHANGE UP (ref 25–170)
CO2 SERPL-SCNC: 22 MMOL/L — SIGNIFICANT CHANGE UP (ref 22–29)
CREAT SERPL-MCNC: 0.95 MG/DL — SIGNIFICANT CHANGE UP (ref 0.5–1.3)
EOSINOPHIL # BLD AUTO: 0 K/UL — SIGNIFICANT CHANGE UP (ref 0–0.5)
EOSINOPHIL NFR BLD AUTO: 0 % — SIGNIFICANT CHANGE UP (ref 0–6)
GLUCOSE SERPL-MCNC: 124 MG/DL — HIGH (ref 70–115)
HCT VFR BLD CALC: 39.3 % — SIGNIFICANT CHANGE UP (ref 37–47)
HGB BLD-MCNC: 13.5 G/DL — SIGNIFICANT CHANGE UP (ref 12–16)
LYMPHOCYTES # BLD AUTO: 0.9 K/UL — LOW (ref 1–4.8)
LYMPHOCYTES # BLD AUTO: 10.5 % — LOW (ref 20–55)
MCHC RBC-ENTMCNC: 32.1 PG — HIGH (ref 27–31)
MCHC RBC-ENTMCNC: 34.4 G/DL — SIGNIFICANT CHANGE UP (ref 32–36)
MCV RBC AUTO: 93.3 FL — SIGNIFICANT CHANGE UP (ref 81–99)
MONOCYTES # BLD AUTO: 0.4 K/UL — SIGNIFICANT CHANGE UP (ref 0–0.8)
MONOCYTES NFR BLD AUTO: 4.2 % — SIGNIFICANT CHANGE UP (ref 3–10)
NEUTROPHILS # BLD AUTO: 7.3 K/UL — SIGNIFICANT CHANGE UP (ref 1.8–8)
NEUTROPHILS NFR BLD AUTO: 85 % — HIGH (ref 37–73)
PLATELET # BLD AUTO: 182 K/UL — SIGNIFICANT CHANGE UP (ref 150–400)
POTASSIUM SERPL-MCNC: 4.8 MMOL/L — SIGNIFICANT CHANGE UP (ref 3.5–5.3)
POTASSIUM SERPL-SCNC: 4.8 MMOL/L — SIGNIFICANT CHANGE UP (ref 3.5–5.3)
PROT SERPL-MCNC: 8.4 G/DL — SIGNIFICANT CHANGE UP (ref 6.6–8.7)
RAPID RVP RESULT: SIGNIFICANT CHANGE UP
RBC # BLD: 4.21 M/UL — LOW (ref 4.4–5.2)
RBC # FLD: 13.2 % — SIGNIFICANT CHANGE UP (ref 11–15.6)
SODIUM SERPL-SCNC: 135 MMOL/L — SIGNIFICANT CHANGE UP (ref 135–145)
TROPONIN T SERPL-MCNC: 0.09 NG/ML — HIGH (ref 0–0.06)
TROPONIN T SERPL-MCNC: 0.09 NG/ML — HIGH (ref 0–0.06)
WBC # BLD: 8.6 K/UL — SIGNIFICANT CHANGE UP (ref 4.8–10.8)
WBC # FLD AUTO: 8.6 K/UL — SIGNIFICANT CHANGE UP (ref 4.8–10.8)

## 2019-03-21 PROCEDURE — 99223 1ST HOSP IP/OBS HIGH 75: CPT | Mod: AI

## 2019-03-21 PROCEDURE — 93010 ELECTROCARDIOGRAM REPORT: CPT

## 2019-03-21 PROCEDURE — 99285 EMERGENCY DEPT VISIT HI MDM: CPT

## 2019-03-21 PROCEDURE — 70450 CT HEAD/BRAIN W/O DYE: CPT | Mod: 26

## 2019-03-21 PROCEDURE — 74176 CT ABD & PELVIS W/O CONTRAST: CPT | Mod: 26

## 2019-03-21 PROCEDURE — 71045 X-RAY EXAM CHEST 1 VIEW: CPT | Mod: 26

## 2019-03-21 PROCEDURE — 72125 CT NECK SPINE W/O DYE: CPT | Mod: 26

## 2019-03-21 RX ORDER — VANCOMYCIN HCL 1 G
250 VIAL (EA) INTRAVENOUS EVERY 6 HOURS
Qty: 0 | Refills: 0 | Status: DISCONTINUED | OUTPATIENT
Start: 2019-03-21 | End: 2019-03-25

## 2019-03-21 RX ORDER — SODIUM CHLORIDE 9 MG/ML
1000 INJECTION INTRAMUSCULAR; INTRAVENOUS; SUBCUTANEOUS
Qty: 0 | Refills: 0 | Status: DISCONTINUED | OUTPATIENT
Start: 2019-03-21 | End: 2019-03-23

## 2019-03-21 RX ORDER — SODIUM CHLORIDE 9 MG/ML
1000 INJECTION, SOLUTION INTRAVENOUS
Qty: 0 | Refills: 0 | Status: DISCONTINUED | OUTPATIENT
Start: 2019-03-21 | End: 2019-03-21

## 2019-03-21 RX ORDER — SODIUM CHLORIDE 9 MG/ML
1000 INJECTION INTRAMUSCULAR; INTRAVENOUS; SUBCUTANEOUS ONCE
Qty: 0 | Refills: 0 | Status: COMPLETED | OUTPATIENT
Start: 2019-03-21 | End: 2019-03-21

## 2019-03-21 RX ORDER — HEPARIN SODIUM 5000 [USP'U]/ML
5000 INJECTION INTRAVENOUS; SUBCUTANEOUS EVERY 8 HOURS
Qty: 0 | Refills: 0 | Status: DISCONTINUED | OUTPATIENT
Start: 2019-03-21 | End: 2019-03-25

## 2019-03-21 RX ADMIN — Medication 250 MILLIGRAM(S): at 18:27

## 2019-03-21 RX ADMIN — SODIUM CHLORIDE 80 MILLILITER(S): 9 INJECTION INTRAMUSCULAR; INTRAVENOUS; SUBCUTANEOUS at 22:05

## 2019-03-21 RX ADMIN — HEPARIN SODIUM 5000 UNIT(S): 5000 INJECTION INTRAVENOUS; SUBCUTANEOUS at 22:05

## 2019-03-21 RX ADMIN — SODIUM CHLORIDE 2000 MILLILITER(S): 9 INJECTION INTRAMUSCULAR; INTRAVENOUS; SUBCUTANEOUS at 13:50

## 2019-03-21 NOTE — ED PROVIDER NOTE - PHYSICAL EXAMINATION
Pt. awake and alert. Pt. oriented to person and place. Pt. awake and alert. Pt. oriented to person and place, not time.  Pt. with generalized tremors.  FROM of upper and lower extremities.  No signs of trauma

## 2019-03-21 NOTE — ED ADULT TRIAGE NOTE - ACCOMPANIED BY
Spoke to patient. Informed her that I tried to do her prior authorization for medication and it was stated that the dosage was not in system. Will talk to Dr. Torres about medication.   EMT/paramedic

## 2019-03-21 NOTE — H&P ADULT - RS GEN PE MLT RESP DETAILS PC
clear to auscultation bilaterally/breath sounds equal/fair air entry both lungs/respirations non-labored/airway patent

## 2019-03-21 NOTE — ED ADULT TRIAGE NOTE - CHIEF COMPLAINT QUOTE
Patient arrived via EMS, awake alert, baseline mental status as per EMS, Patient was found on floor by family.  patient states fell this morning at approx 0600.  Last see normal last night by family.  unknown LOC.  unknown if hit head.  No obvious signs of trauma.  Dr. Burrows called to bedside, priority CT called.

## 2019-03-21 NOTE — H&P ADULT - ASSESSMENT
84 yo female with h/o HTN , fall and syncope admitted for weakness , fall at home un clear if she had syncope , prerenal azotemia and cronic diarrhea

## 2019-03-21 NOTE — CONSULT NOTE ADULT - SUBJECTIVE AND OBJECTIVE BOX
[facility], THE HEART CENTER                                   [address] [place] [address]                                                      PHONE: [phone]                                                         FAX: [phone]  [email or web address]  ---------------------------------------------------------------------------------------------------------------------------------    Reason for Consult: R/O SYNCOPE    HPI:  YADIRA LEVI is an 85y Female with Hx of frequent falls s/p ILR placement last December found in the floor by family member with mild confusion. She is presently more awake doesnt recall details about episode. No CP or SOB    PAST MEDICAL & SURGICAL HISTORY:  Hypertension  Hypercholesterolemia  Hypothyroid  No significant past surgical history      penicillins (Rash)      MEDICATIONS  (STANDING):    MEDICATIONS  (PRN):      Social History:  Cigarettes:                    Alchohol:                 Illicit Drug Abuse:      REVIEW OF SYSTEMS:    Constitutional: No fever, weight loss or fatigue  Eyes: No eye pain, visual disturbances, or discharge  ENMT:  No difficulty hearing, tinnitus, vertigo; No sinus or throat pain  Neck: No pain or stiffness  Respiratory: No cough, wheezing, chills or hemoptysis  Cardiovascular: No chest pain, palpitations, shortness of breath, dizziness or leg swelling  Gastrointestinal: No abdominal or epigastric pain. No nausea, vomiting or hematemesis; No diarrhea or constipation. No melena or hematochezia.  Genitourinary: No dysuria, frequency, hematuria or incontinence  Rectal: No pain, hemorrhoids or incontinence  Neurological: CONFUSED  Skin: No itching, burning, rashes or lesions   Lymph Nodes: No enlarged glands  Endocrine: No heat or cold intolerance; No hair loss  Musculoskeletal: No joint pain or swelling; No muscle, back or extremity pain  Psychiatric: No depression, anxiety, mood swings or difficulty sleeping  Heme/Lymph: No easy bruising or bleeding gums  Allergy and Immunologic: No hives or eczema    Vital Signs Last 24 Hrs  T(C): 36.8 (21 Mar 2019 14:55), Max: 36.8 (21 Mar 2019 14:55)  T(F): 98.2 (21 Mar 2019 14:55), Max: 98.2 (21 Mar 2019 14:55)  HR: 75 (21 Mar 2019 14:55) (75 - 79)  BP: 147/67 (21 Mar 2019 14:55) (147/67 - 168/77)  BP(mean): --  RR: 20 (21 Mar 2019 14:55) (20 - 20)  SpO2: 98% (21 Mar 2019 14:55) (98% - 98%)  ICU Vital Signs Last 24 Hrs  YADIRA LEVI  I&O's Detail    I&O's Summary    Drug Dosing Weight  YADIRA LEVI      PHYSICAL EXAM:  General: Appears well developed, well nourished alert and cooperative.  HEENT: Head; normocephalic, atraumatic.  Eyes: Pupils reactive, cornea wnl.  Neck: Supple, no nodes adenopathy, no NVD or carotid bruit or thyromegaly.  CARDIOVASCULAR: Normal S1 and S2, No murmur, rub, gallop or lift.   LUNGS: No rales, rhonchi or wheeze. Normal breath sounds bilaterally.  ABDOMEN: Soft, nontender without mass or organomegaly. bowel sounds normoactive.  EXTREMITIES: No clubbing, cyanosis or edema. Distal pulses wnl.   SKIN: warm and dry with normal turgor.  NEURO: Alert/oriented x 3/normal motor exam. No pathologic reflexes.    PSYCH: normal affect.        LABS:                        13.5   8.6   )-----------( 182      ( 21 Mar 2019 13:12 )             39.3     03-21    135  |  101  |  29.0<H>  ----------------------------<  124<H>  4.8   |  22.0  |  0.95    Ca    12.8<H>      21 Mar 2019 13:12    TPro  8.4  /  Alb  4.1  /  TBili  0.4  /  DBili  x   /  AST  25  /  ALT  9   /  AlkPhos  81  03-21    YADIRA LEVI  CARDIAC MARKERS ( 21 Mar 2019 13:12 )  x     / 0.09 ng/mL / 135 U/L / x     / x                RADIOLOGY & ADDITIONAL STUDIES:    INTERPRETATION OF TELEMETRY (personally reviewed):    ECG: NSR RBBB    ECHO: < from: TTE Echo Complete w/Doppler (01.02.19 @ 11:30) >  Summary:   1. Moderately enlarged left atrium.   2. There is mild concentric left ventricular hypertrophy.   3. Normal wall motion. Left ventricular ejection fraction, by visual   estimation, is 60 to 65%. Grade II diastolic dysfunction.   4. Elevated mean left atrial pressure. (LAP > 20 mm Hg based on AI jet)   5. Moderately enlarged right atrium.   6. Normal right ventricular size and function.   7. Mild mitral valve regurgitation.   8. Mild aortic regurgitation.   9. Trivial pericardial effusion.  10. Despite elevated left atrial pressures, the IVC is collapsible that   suggest other euvolemic cause such as coronary artery disease, or   uncontrolled blood pressure or dehydration. Suggest clinical correlation.      < end of copied text >      ACTIVE PROBLEMS:  HEALTH ISSUES - PROBLEM Dx:          Assessment and Plan:    In summary,   YADIRA LEVI is an 85y Female with Hx of frequent falls s/p ILR placement last December found in the floor by family member with mild confusion. She is presently more awake doesnt recall details about episode. No CP or SOB    Telemetry monitoring  IVF  Serial cardiac markers and EKgs  will interrogate ILR today  Continue present cardiac therapy  r/o infective source    SONAL RAMSAY MD, FACC, NINO

## 2019-03-21 NOTE — H&P ADULT - NSHPLABSRESULTS_GEN_ALL_CORE
13.5   8.6   )-----------( 182      ( 21 Mar 2019 13:12 )             39.3   03-21    135  |  101  |  29.0<H>  ----------------------------<  124<H>  4.8   |  22.0  |  0.95    Ca    12.8<H>      21 Mar 2019 13:12    TPro  8.4  /  Alb  4.1  /  TBili  0.4  /  DBili  x   /  AST  25  /  ALT  9   /  AlkPhos  81  03-21

## 2019-03-21 NOTE — H&P ADULT - PROBLEM SELECTOR PLAN 1
Last visit 4-19-18  Last refill Baclofen 12-27-17  
loop interrogation per [employer]   r/o infection , send UA , RVP   if spikes will culture   iv hydration sign of mild dehydration

## 2019-03-21 NOTE — H&P ADULT - NSICDXFAMILYHX_GEN_ALL_CORE_FT
FAMILY HISTORY:  FH: heart disease, father  when she was  9 , does not remember his exact age when   FH: stroke, her mother  at age 70's

## 2019-03-21 NOTE — H&P ADULT - HISTORY OF PRESENT ILLNESS
pt is 84 yo female with HTN , loop recorder for previous syncope /fall in dec 2018 brought via ambulance for fall and probable LOC . She was seen by family last night in her usual state of health . In am aid came when no answer from the patient family went in and she was found on the floor , weak awake . Per daughter she lives in the first floor by herself family of her daughter lives upstairs . Pt is awake alert , states that  she fell last night ? not very sure or good historian ,she  denies any pain , no headache , no chest pain , denies shortness of breath , general weakness noted . Her daughter reports cronic loose BM since dec on off , pt denies nay abdominal pain or nausea .

## 2019-03-21 NOTE — ED ADULT NURSE NOTE - OBJECTIVE STATEMENT
Patient presented to the ED with her daughter c/o of a fall. Patient was alone at home and was found by granddaughter in the living room of her home. Denies hitting head, pain/discomfort. No signs of bleeding, bruising on head. Patient has a history of fall- 12/29/18, 2 fractured left ribs.

## 2019-03-21 NOTE — ED PROVIDER NOTE - PROGRESS NOTE DETAILS
Pt. to be admitted for syncope. no active chest pain. Cardio consulted([facility]). Case discussed with Dr. Egan for admission.

## 2019-03-21 NOTE — ED PROVIDER NOTE - OBJECTIVE STATEMENT
Patient arrived via EMS due to a fall. , Pt. is awake alert, at her baseline mental status as per EMS. Patient was found on floor by family this morning.  patient states fell this morning at approx 0600. Fall was unwitnessed. Pt. was last seen  last night by family. No obvious signs of trauma. Pt. denies any pain. NO headache. No chest pain. No arm or leg pain. As per EMS, Pt. had sustained a fall back in January of this year. Pt. eversince uses a walker. Patient arrived via EMS due to a fall. , Pt. is awake alert, at her baseline mental status as per EMS. Patient was found on floor 11AM by family this morning.  patient states fell this morning at approx 0600. Fall was unwitnessed. Pt. was last seen  last night by family. No obvious signs of trauma. Pt. denies any pain. NO headache. No chest pain. No arm or leg pain. As per EMS, Pt. had sustained a fall back in December of last  year. Pt. ever since uses a walker. Pt. states that she is not sure why fell today again. Pt. had a syncopal episode during the admission and had a loop recorder placed in January. Patient arrived via EMS due to a fall. , Pt. is awake alert, at her baseline mental status as per EMS. Patient was found on floor 11AM by family this morning.  patient states fell this morning. Fall was unwitnessed. Pt. was last seen  last night by family. No obvious signs of trauma. Pt. denies any pain. NO headache. No chest pain. No arm or leg pain. As per EMS, Pt. had sustained a fall back in December of last  year. Pt. ever since uses a walker. Pt. states that she is not sure why fell today again. Pt. had a syncopal episode during the admission and had a loop recorder placed in January. Pt. with decrease PO intake since pt. has been home from the hospital.

## 2019-03-21 NOTE — ED ADULT NURSE NOTE - INTERVENTIONS DEFINITIONS
Call bell, personal items and telephone within reach/Provide visual cue, wrist band, yellow gown, etc./Stretcher in lowest position, wheels locked, appropriate side rails in place/Instruct patient to call for assistance/Non-slip footwear when patient is off stretcher/Physically safe environment: no spills, clutter or unnecessary equipment

## 2019-03-21 NOTE — H&P ADULT - PROBLEM SELECTOR PLAN 2
cronic intermittent per daughter , CT of abd /pelvis   stool for c diff and GI PCR ordered   monitor

## 2019-03-22 DIAGNOSIS — A04.72 ENTEROCOLITIS DUE TO CLOSTRIDIUM DIFFICILE, NOT SPECIFIED AS RECURRENT: ICD-10-CM

## 2019-03-22 LAB
CULTURE RESULTS: SIGNIFICANT CHANGE UP
SPECIMEN SOURCE: SIGNIFICANT CHANGE UP
TROPONIN T SERPL-MCNC: 0.1 NG/ML — HIGH (ref 0–0.06)

## 2019-03-22 PROCEDURE — 99233 SBSQ HOSP IP/OBS HIGH 50: CPT

## 2019-03-22 RX ORDER — LEVOTHYROXINE SODIUM 125 MCG
100 TABLET ORAL DAILY
Qty: 0 | Refills: 0 | Status: DISCONTINUED | OUTPATIENT
Start: 2019-03-22 | End: 2019-03-25

## 2019-03-22 RX ORDER — ACETAMINOPHEN 500 MG
650 TABLET ORAL EVERY 6 HOURS
Qty: 0 | Refills: 0 | Status: DISCONTINUED | OUTPATIENT
Start: 2019-03-22 | End: 2019-03-25

## 2019-03-22 RX ORDER — ATORVASTATIN CALCIUM 80 MG/1
10 TABLET, FILM COATED ORAL AT BEDTIME
Qty: 0 | Refills: 0 | Status: DISCONTINUED | OUTPATIENT
Start: 2019-03-22 | End: 2019-03-25

## 2019-03-22 RX ORDER — AMLODIPINE BESYLATE 2.5 MG/1
5 TABLET ORAL DAILY
Qty: 0 | Refills: 0 | Status: DISCONTINUED | OUTPATIENT
Start: 2019-03-22 | End: 2019-03-25

## 2019-03-22 RX ORDER — ASPIRIN/CALCIUM CARB/MAGNESIUM 324 MG
81 TABLET ORAL DAILY
Qty: 0 | Refills: 0 | Status: DISCONTINUED | OUTPATIENT
Start: 2019-03-22 | End: 2019-03-25

## 2019-03-22 RX ADMIN — Medication 250 MILLIGRAM(S): at 23:04

## 2019-03-22 RX ADMIN — Medication 250 MILLIGRAM(S): at 13:41

## 2019-03-22 RX ADMIN — ATORVASTATIN CALCIUM 10 MILLIGRAM(S): 80 TABLET, FILM COATED ORAL at 23:04

## 2019-03-22 RX ADMIN — HEPARIN SODIUM 5000 UNIT(S): 5000 INJECTION INTRAVENOUS; SUBCUTANEOUS at 05:17

## 2019-03-22 RX ADMIN — HEPARIN SODIUM 5000 UNIT(S): 5000 INJECTION INTRAVENOUS; SUBCUTANEOUS at 23:04

## 2019-03-22 RX ADMIN — HEPARIN SODIUM 5000 UNIT(S): 5000 INJECTION INTRAVENOUS; SUBCUTANEOUS at 13:41

## 2019-03-22 RX ADMIN — Medication 250 MILLIGRAM(S): at 05:17

## 2019-03-22 RX ADMIN — Medication 81 MILLIGRAM(S): at 13:42

## 2019-03-22 RX ADMIN — Medication 250 MILLIGRAM(S): at 00:32

## 2019-03-22 RX ADMIN — Medication 250 MILLIGRAM(S): at 17:45

## 2019-03-22 NOTE — PHYSICAL THERAPY INITIAL EVALUATION ADULT - ADDITIONAL COMMENTS
Pt lives in a private home. Pt's daughter lives upstairs from pt. 3-4 steps to enter with handrails, no steps inside. Pt was independent PTA with RW for ambulation, required assist for ADLs. Pt owns RW and shower chair.

## 2019-03-22 NOTE — PHYSICAL THERAPY INITIAL EVALUATION ADULT - PERTINENT HX OF CURRENT PROBLEM, REHAB EVAL
84 yo female with HTN , loop recorder for previous syncope /fall in dec 2018 brought via ambulance for fall and probable LOC. Per H&P, pt with c/o diarrhea PTA, admitted for C-Diff

## 2019-03-22 NOTE — PROGRESS NOTE ADULT - SUBJECTIVE AND OBJECTIVE BOX
Patient is a 85y old  Female who presents with a chief complaint of fall , weakness   seen in am , labs reviewed C alyssa egan on po vanco since yesterday   loop recorder interrogated no cardiac events reported   pt is awake alert  seen in am , denies pain , no dizziness       Vital Signs Last 24 Hrs  T(C): 36.8 (22 Mar 2019 10:07), Max: 37.1 (21 Mar 2019 19:42)  T(F): 98.2 (22 Mar 2019 10:07), Max: 98.7 (21 Mar 2019 19:42)  HR: 76 (22 Mar 2019 10:07) (65 - 85)  BP: 138/80 (22 Mar 2019 10:07) (138/80 - 168/77)  BP(mean): --  RR: 17 (22 Mar 2019 10:07) (14 - 20)  SpO2: 97% (22 Mar 2019 05:20) (96% - 98%)    PHYSICAL EXAM:    GENERAL: NAD, well-groomed , elderly female lying in the bed   NECK: Supple, No JVD  CHEST/LUNG: Clear to auscultation  bilaterally; No rales, rhonchi, wheezing  HEART: Regular rate and rhythm; S1/S2 No murmurs, rubs  ABDOMEN: Soft, Nontender, Nondistended; Bowel sounds present  EXTREMITIES:   No clubbing, cyanosis, or edema  Skin : normal color no rash     LABS:                        13.5   8.6   )-----------( 182      ( 21 Mar 2019 13:12 )             39.3     03-21    135  |  101  |  29.0<H>  ----------------------------<  124<H>  4.8   |  22.0  |  0.95    Ca    12.8<H>      21 Mar 2019 13:12    TPro  8.4  /  Alb  4.1  /  TBili  0.4  /  DBili  x   /  AST  25  /  ALT  9   /  AlkPhos  81  03-21      Clostridium difficile Toxin by PCR: RESULT INTERPRETATION:    Detected - Clostridium difficile toxin B detected by amplified DNA PCR .    < from: CT Abdomen and Pelvis w/ Oral Cont (03.21.19 @ 20:24) >  IMPRESSION:    Extensive colonic diverticulosis. No evidence of diverticulitis. No   evidence of colitis.    Cholelithiasis and sludge in the gallbladder lumen without evidence of   acute cholecystitis..      < end of copied text >

## 2019-03-22 NOTE — PROGRESS NOTE ADULT - ASSESSMENT
84 yo female with recurrent falls, s/p loop recorder in dec admitted for fall, weakness , ongoing diarrhea C diff positive started on po vanco , cardiology consulted loop recorder interrogated , no cardiac events , dehydration with prerenal azotemia iv fluid given

## 2019-03-22 NOTE — PROGRESS NOTE ADULT - PROBLEM SELECTOR PLAN 4
h/o syncope in dec with loop recorder   cardio input appreciated   likely due to dehydration  and deconditioning

## 2019-03-22 NOTE — PHYSICAL THERAPY INITIAL EVALUATION ADULT - IMPAIRED TRANSFERS: BED/CHAIR, REHAB EVAL
Pt reporting bowel incontinence, only ambulated to chair, CNA aware of need for hygiene care./decreased strength/impaired postural control/impaired balance

## 2019-03-22 NOTE — PHYSICAL THERAPY INITIAL EVALUATION ADULT - CRITERIA FOR SKILLED THERAPEUTIC INTERVENTIONS
anticipated discharge recommendation/Home with RW, Home PT, 24/7 supervision/assist/impairments found

## 2019-03-22 NOTE — PROGRESS NOTE ADULT - SUBJECTIVE AND OBJECTIVE BOX
[facility], THE HEART CENTER                                   [address] [place] [address]                                                      PHONE: [phone]                                                         FAX: [phone]  [email or web address]  ---------------------------------------------------------------------------------------------------------------------------------    Reason for Consult: R/O SYNCOPE    HPI:  YADIRA LEVI is an 85y Female with Hx of frequent falls s/p ILR placement last December found in the floor by family member with mild confusion. She is presently more awake doesnt recall details about episode. No CP or SOB    ILR interrogated no events  Feels better today     PAST MEDICAL & SURGICAL HISTORY:  Hypertension  Hypercholesterolemia  Hypothyroid  No significant past surgical history      penicillins (Rash)      MEDICATIONS  (STANDING):    MEDICATIONS  (PRN):      Social History:  Cigarettes:                    Alchohol:                 Illicit Drug Abuse:      REVIEW OF SYSTEMS:    Constitutional: No fever, weight loss or fatigue  Eyes: No eye pain, visual disturbances, or discharge  ENMT:  No difficulty hearing, tinnitus, vertigo; No sinus or throat pain  Neck: No pain or stiffness  Respiratory: No cough, wheezing, chills or hemoptysis  Cardiovascular: No chest pain, palpitations, shortness of breath, dizziness or leg swelling  Gastrointestinal: No abdominal or epigastric pain. No nausea, vomiting or hematemesis; No diarrhea or constipation. No melena or hematochezia.  Genitourinary: No dysuria, frequency, hematuria or incontinence  Rectal: No pain, hemorrhoids or incontinence  Neurological: CONFUSED  Skin: No itching, burning, rashes or lesions   Lymph Nodes: No enlarged glands  Endocrine: No heat or cold intolerance; No hair loss  Musculoskeletal: No joint pain or swelling; No muscle, back or extremity pain  Psychiatric: No depression, anxiety, mood swings or difficulty sleeping  Heme/Lymph: No easy bruising or bleeding gums  Allergy and Immunologic: No hives or eczema    Vital Signs Last 24 Hrs  T(C): 36.8 (21 Mar 2019 14:55), Max: 36.8 (21 Mar 2019 14:55)  T(F): 98.2 (21 Mar 2019 14:55), Max: 98.2 (21 Mar 2019 14:55)  HR: 75 (21 Mar 2019 14:55) (75 - 79)  BP: 147/67 (21 Mar 2019 14:55) (147/67 - 168/77)  BP(mean): --  RR: 20 (21 Mar 2019 14:55) (20 - 20)  SpO2: 98% (21 Mar 2019 14:55) (98% - 98%)  ICU Vital Signs Last 24 Hrs  YADIRA LEVI  I&O's Detail    I&O's Summary    Drug Dosing Weight  YADIRA AMITA      PHYSICAL EXAM:  General: Appears well developed, well nourished alert and cooperative.  HEENT: Head; normocephalic, atraumatic.  Eyes: Pupils reactive, cornea wnl.  Neck: Supple, no nodes adenopathy, no NVD or carotid bruit or thyromegaly.  CARDIOVASCULAR: Normal S1 and S2, No murmur, rub, gallop or lift.   LUNGS: No rales, rhonchi or wheeze. Normal breath sounds bilaterally.  ABDOMEN: Soft, nontender without mass or organomegaly. bowel sounds normoactive.  EXTREMITIES: No clubbing, cyanosis or edema. Distal pulses wnl.   SKIN: warm and dry with normal turgor.  NEURO: Alert/oriented x 3/normal motor exam. No pathologic reflexes.    PSYCH: normal affect.        LABS:                        13.5   8.6   )-----------( 182      ( 21 Mar 2019 13:12 )             39.3     03-21    135  |  101  |  29.0<H>  ----------------------------<  124<H>  4.8   |  22.0  |  0.95    Ca    12.8<H>      21 Mar 2019 13:12    TPro  8.4  /  Alb  4.1  /  TBili  0.4  /  DBili  x   /  AST  25  /  ALT  9   /  AlkPhos  81  03-21    YADIRA LEVI  CARDIAC MARKERS ( 21 Mar 2019 13:12 )  x     / 0.09 ng/mL / 135 U/L / x     / x                RADIOLOGY & ADDITIONAL STUDIES:    INTERPRETATION OF TELEMETRY (personally reviewed):    ECG: NSR RBBB    ECHO: < from: TTE Echo Complete w/Doppler (01.02.19 @ 11:30) >  Summary:   1. Moderately enlarged left atrium.   2. There is mild concentric left ventricular hypertrophy.   3. Normal wall motion. Left ventricular ejection fraction, by visual   estimation, is 60 to 65%. Grade II diastolic dysfunction.   4. Elevated mean left atrial pressure. (LAP > 20 mm Hg based on AI jet)   5. Moderately enlarged right atrium.   6. Normal right ventricular size and function.   7. Mild mitral valve regurgitation.   8. Mild aortic regurgitation.   9. Trivial pericardial effusion.  10. Despite elevated left atrial pressures, the IVC is collapsible that   suggest other euvolemic cause such as coronary artery disease, or   uncontrolled blood pressure or dehydration. Suggest clinical correlation.      < end of copied text >      ACTIVE PROBLEMS:  HEALTH ISSUES - PROBLEM Dx:          Assessment and Plan:    In summary,   YADIRA LEVI is an 85y Female with Hx of frequent falls s/p ILR placement last December found in the floor by family member with mild confusion. She is presently more awake doesnt recall details about episode. No CP or SOB    OK to DC tele  Continue present cardiac therapy  No further cardiac intervention at present    SONAL RAMSAY MD, FACC, NINO

## 2019-03-23 DIAGNOSIS — E87.8 OTHER DISORDERS OF ELECTROLYTE AND FLUID BALANCE, NOT ELSEWHERE CLASSIFIED: ICD-10-CM

## 2019-03-23 LAB
ANION GAP SERPL CALC-SCNC: 9 MMOL/L — SIGNIFICANT CHANGE UP (ref 5–17)
BUN SERPL-MCNC: 20 MG/DL — SIGNIFICANT CHANGE UP (ref 8–20)
CALCIUM SERPL-MCNC: 8.1 MG/DL — LOW (ref 8.6–10.2)
CHLORIDE SERPL-SCNC: 116 MMOL/L — HIGH (ref 98–107)
CO2 SERPL-SCNC: 16 MMOL/L — LOW (ref 22–29)
CREAT SERPL-MCNC: 0.54 MG/DL — SIGNIFICANT CHANGE UP (ref 0.5–1.3)
GLUCOSE SERPL-MCNC: 78 MG/DL — SIGNIFICANT CHANGE UP (ref 70–115)
MAGNESIUM SERPL-MCNC: 1.4 MG/DL — LOW (ref 1.6–2.6)
PHOSPHATE SERPL-MCNC: 1.1 MG/DL — LOW (ref 2.4–4.7)
POTASSIUM SERPL-MCNC: 2.8 MMOL/L — CRITICAL LOW (ref 3.5–5.3)
POTASSIUM SERPL-SCNC: 2.8 MMOL/L — CRITICAL LOW (ref 3.5–5.3)
SODIUM SERPL-SCNC: 141 MMOL/L — SIGNIFICANT CHANGE UP (ref 135–145)

## 2019-03-23 PROCEDURE — 99233 SBSQ HOSP IP/OBS HIGH 50: CPT

## 2019-03-23 RX ORDER — SODIUM,POTASSIUM PHOSPHATES 278-250MG
1 POWDER IN PACKET (EA) ORAL
Qty: 0 | Refills: 0 | Status: DISCONTINUED | OUTPATIENT
Start: 2019-03-23 | End: 2019-03-25

## 2019-03-23 RX ORDER — MAGNESIUM SULFATE 500 MG/ML
2 VIAL (ML) INJECTION ONCE
Qty: 0 | Refills: 0 | Status: COMPLETED | OUTPATIENT
Start: 2019-03-23 | End: 2019-03-23

## 2019-03-23 RX ORDER — POTASSIUM CHLORIDE 20 MEQ
40 PACKET (EA) ORAL EVERY 4 HOURS
Qty: 0 | Refills: 0 | Status: COMPLETED | OUTPATIENT
Start: 2019-03-23 | End: 2019-03-23

## 2019-03-23 RX ORDER — POTASSIUM CHLORIDE 20 MEQ
10 PACKET (EA) ORAL ONCE
Qty: 0 | Refills: 0 | Status: COMPLETED | OUTPATIENT
Start: 2019-03-23 | End: 2019-03-23

## 2019-03-23 RX ADMIN — AMLODIPINE BESYLATE 5 MILLIGRAM(S): 2.5 TABLET ORAL at 05:58

## 2019-03-23 RX ADMIN — Medication 81 MILLIGRAM(S): at 13:41

## 2019-03-23 RX ADMIN — HEPARIN SODIUM 5000 UNIT(S): 5000 INJECTION INTRAVENOUS; SUBCUTANEOUS at 13:41

## 2019-03-23 RX ADMIN — Medication 250 MILLIGRAM(S): at 05:58

## 2019-03-23 RX ADMIN — ATORVASTATIN CALCIUM 10 MILLIGRAM(S): 80 TABLET, FILM COATED ORAL at 23:06

## 2019-03-23 RX ADMIN — Medication 50 GRAM(S): at 16:01

## 2019-03-23 RX ADMIN — Medication 250 MILLIGRAM(S): at 13:42

## 2019-03-23 RX ADMIN — Medication 1 TABLET(S): at 19:06

## 2019-03-23 RX ADMIN — Medication 1 TABLET(S): at 23:06

## 2019-03-23 RX ADMIN — SODIUM CHLORIDE 80 MILLILITER(S): 9 INJECTION INTRAMUSCULAR; INTRAVENOUS; SUBCUTANEOUS at 03:04

## 2019-03-23 RX ADMIN — Medication 250 MILLIGRAM(S): at 19:07

## 2019-03-23 RX ADMIN — HEPARIN SODIUM 5000 UNIT(S): 5000 INJECTION INTRAVENOUS; SUBCUTANEOUS at 05:58

## 2019-03-23 RX ADMIN — Medication 40 MILLIEQUIVALENT(S): at 16:02

## 2019-03-23 RX ADMIN — HEPARIN SODIUM 5000 UNIT(S): 5000 INJECTION INTRAVENOUS; SUBCUTANEOUS at 23:06

## 2019-03-23 RX ADMIN — Medication 100 MILLIEQUIVALENT(S): at 16:01

## 2019-03-23 RX ADMIN — Medication 250 MILLIGRAM(S): at 23:06

## 2019-03-23 RX ADMIN — Medication 40 MILLIEQUIVALENT(S): at 19:07

## 2019-03-23 RX ADMIN — Medication 100 MICROGRAM(S): at 05:58

## 2019-03-23 NOTE — PROGRESS NOTE ADULT - ASSESSMENT
86 yo female with recurrent falls, s/p loop recorder in dec admitted for fall, weakness , ongoing diarrhea C diff positive started on po vanco , cardiology consulted loop recorder interrogated , no cardiac events , dehydration with prerenal azotemia iv fluid given

## 2019-03-23 NOTE — PROGRESS NOTE ADULT - SUBJECTIVE AND OBJECTIVE BOX
Internal Medicine Hospitalist - Dr. Kali LEVI    [id number]    85y      Female    Patient is a 85y old  Female who presents with a chief complaint of fall , weakness (22 Mar 2019 11:46)    INTERVAL HPI/ OVERNIGHT EVENTS: Patient is seen and examined, denied fever, chills, abd. pain, nausea, vomiting and diarrhea    REVIEW OF SYSTEMS:    Denied fever, chills, abd. pain, nausea, vomiting, chest pain, SOB, headache, dizziness    PHYSICAL EXAM:    Vital Signs Last 24 Hrs  T(C): 37 (23 Mar 2019 11:44), Max: 37.2 (22 Mar 2019 17:43)  T(F): 98.6 (23 Mar 2019 11:44), Max: 98.9 (22 Mar 2019 17:43)  HR: 73 (23 Mar 2019 11:44) (67 - 75)  BP: 128/64 (23 Mar 2019 11:44) (128/64 - 158/76)  BP(mean): --  RR: 19 (23 Mar 2019 11:44) (18 - 19)  SpO2: 99% (23 Mar 2019 04:30) (94% - 99%)    GENERAL: NAD  CHEST/LUNG: CTA b/l   HEART: S1S2+ audible  ABDOMEN: Soft, Nontender, Nondistended; Bowel sounds present  EXTREMITIES:  no edema  CNS: AAO X 3  Psychiatry: normal mood    LABS:    03-23    141  |  116<H>  |  20.0  ----------------------------<  78  2.8<LL>   |  16.0<L>  |  0.54    Ca    8.1<L>      23 Mar 2019 12:11  Phos  1.1     03-23  Mg     1.4     03-23              MEDICATIONS  (STANDING):  amLODIPine   Tablet 5 milliGRAM(s) Oral daily  aspirin enteric coated 81 milliGRAM(s) Oral daily  atorvastatin 10 milliGRAM(s) Oral at bedtime  heparin  Injectable 5000 Unit(s) SubCutaneous every 8 hours  levothyroxine 100 MICROGram(s) Oral daily  magnesium sulfate  IVPB 2 Gram(s) IV Intermittent once  potassium chloride    Tablet ER 40 milliEquivalent(s) Oral every 4 hours  potassium chloride  10 mEq/100 mL IVPB 10 milliEquivalent(s) IV Intermittent once  vancomycin    Solution 250 milliGRAM(s) Oral every 6 hours    MEDICATIONS  (PRN):  acetaminophen   Tablet .. 650 milliGRAM(s) Oral every 6 hours PRN Temp greater or equal to 38C (100.4F)      RADIOLOGY & ADDITIONAL TEST

## 2019-03-24 LAB
ANION GAP SERPL CALC-SCNC: 11 MMOL/L — SIGNIFICANT CHANGE UP (ref 5–17)
APPEARANCE UR: CLEAR — SIGNIFICANT CHANGE UP
BACTERIA # UR AUTO: ABNORMAL
BILIRUB UR-MCNC: NEGATIVE — SIGNIFICANT CHANGE UP
BUN SERPL-MCNC: 22 MG/DL — HIGH (ref 8–20)
CALCIUM SERPL-MCNC: 11.9 MG/DL — HIGH (ref 8.6–10.2)
CHLORIDE SERPL-SCNC: 108 MMOL/L — HIGH (ref 98–107)
CO2 SERPL-SCNC: 18 MMOL/L — LOW (ref 22–29)
COLOR SPEC: YELLOW — SIGNIFICANT CHANGE UP
CREAT SERPL-MCNC: 0.89 MG/DL — SIGNIFICANT CHANGE UP (ref 0.5–1.3)
DIFF PNL FLD: ABNORMAL
EPI CELLS # UR: SIGNIFICANT CHANGE UP
GLUCOSE SERPL-MCNC: 92 MG/DL — SIGNIFICANT CHANGE UP (ref 70–115)
GLUCOSE UR QL: NEGATIVE MG/DL — SIGNIFICANT CHANGE UP
HCT VFR BLD CALC: 41.7 % — SIGNIFICANT CHANGE UP (ref 37–47)
HGB BLD-MCNC: 14 G/DL — SIGNIFICANT CHANGE UP (ref 12–16)
KETONES UR-MCNC: NEGATIVE — SIGNIFICANT CHANGE UP
LEUKOCYTE ESTERASE UR-ACNC: ABNORMAL
MAGNESIUM SERPL-MCNC: 2.4 MG/DL — SIGNIFICANT CHANGE UP (ref 1.8–2.6)
MCHC RBC-ENTMCNC: 31.6 PG — HIGH (ref 27–31)
MCHC RBC-ENTMCNC: 33.6 G/DL — SIGNIFICANT CHANGE UP (ref 32–36)
MCV RBC AUTO: 94.1 FL — SIGNIFICANT CHANGE UP (ref 81–99)
NITRITE UR-MCNC: NEGATIVE — SIGNIFICANT CHANGE UP
PH UR: 6.5 — SIGNIFICANT CHANGE UP (ref 5–8)
PLATELET # BLD AUTO: 232 K/UL — SIGNIFICANT CHANGE UP (ref 150–400)
POTASSIUM SERPL-MCNC: 5.1 MMOL/L — SIGNIFICANT CHANGE UP (ref 3.5–5.3)
POTASSIUM SERPL-SCNC: 5.1 MMOL/L — SIGNIFICANT CHANGE UP (ref 3.5–5.3)
PROT UR-MCNC: 30 MG/DL
RBC # BLD: 4.43 M/UL — SIGNIFICANT CHANGE UP (ref 4.4–5.2)
RBC # FLD: 13.5 % — SIGNIFICANT CHANGE UP (ref 11–15.6)
RBC CASTS # UR COMP ASSIST: SIGNIFICANT CHANGE UP /HPF (ref 0–4)
SODIUM SERPL-SCNC: 137 MMOL/L — SIGNIFICANT CHANGE UP (ref 135–145)
SP GR SPEC: 1.01 — SIGNIFICANT CHANGE UP (ref 1.01–1.02)
UROBILINOGEN FLD QL: NEGATIVE MG/DL — SIGNIFICANT CHANGE UP
WBC # BLD: 8.5 K/UL — SIGNIFICANT CHANGE UP (ref 4.8–10.8)
WBC # FLD AUTO: 8.5 K/UL — SIGNIFICANT CHANGE UP (ref 4.8–10.8)
WBC UR QL: SIGNIFICANT CHANGE UP

## 2019-03-24 PROCEDURE — 99232 SBSQ HOSP IP/OBS MODERATE 35: CPT

## 2019-03-24 RX ADMIN — Medication 100 MICROGRAM(S): at 06:57

## 2019-03-24 RX ADMIN — HEPARIN SODIUM 5000 UNIT(S): 5000 INJECTION INTRAVENOUS; SUBCUTANEOUS at 23:06

## 2019-03-24 RX ADMIN — AMLODIPINE BESYLATE 5 MILLIGRAM(S): 2.5 TABLET ORAL at 06:57

## 2019-03-24 RX ADMIN — Medication 250 MILLIGRAM(S): at 18:15

## 2019-03-24 RX ADMIN — Medication 250 MILLIGRAM(S): at 23:07

## 2019-03-24 RX ADMIN — Medication 1 TABLET(S): at 08:50

## 2019-03-24 RX ADMIN — Medication 1 TABLET(S): at 12:26

## 2019-03-24 RX ADMIN — HEPARIN SODIUM 5000 UNIT(S): 5000 INJECTION INTRAVENOUS; SUBCUTANEOUS at 13:51

## 2019-03-24 RX ADMIN — Medication 1 TABLET(S): at 18:15

## 2019-03-24 RX ADMIN — Medication 81 MILLIGRAM(S): at 12:26

## 2019-03-24 RX ADMIN — HEPARIN SODIUM 5000 UNIT(S): 5000 INJECTION INTRAVENOUS; SUBCUTANEOUS at 06:56

## 2019-03-24 RX ADMIN — Medication 250 MILLIGRAM(S): at 06:56

## 2019-03-24 RX ADMIN — Medication 250 MILLIGRAM(S): at 12:26

## 2019-03-24 RX ADMIN — ATORVASTATIN CALCIUM 10 MILLIGRAM(S): 80 TABLET, FILM COATED ORAL at 23:07

## 2019-03-24 RX ADMIN — Medication 1 TABLET(S): at 23:07

## 2019-03-24 NOTE — PROGRESS NOTE ADULT - SUBJECTIVE AND OBJECTIVE BOX
Internal Medicine Hospitalist - Dr. Kali LEVI    [id number]    85y      Female    Patient is a 85y old  Female who presents with a chief complaint of fall , weakness (23 Mar 2019 15:41)    INTERVAL HPI/ OVERNIGHT EVENTS: Patient is seen and examined, no new event overnight.     REVIEW OF SYSTEMS:    Denied fever, chills, abd. pain, nausea, vomiting, chest pain, SOB, headache, dizziness    PHYSICAL EXAM:    Vital Signs Last 24 Hrs  T(C): 37.1 (24 Mar 2019 10:56), Max: 37.1 (24 Mar 2019 05:30)  T(F): 98.7 (24 Mar 2019 10:56), Max: 98.7 (24 Mar 2019 05:30)  HR: 74 (24 Mar 2019 10:56) (62 - 74)  BP: 122/71 (24 Mar 2019 10:56) (122/71 - 163/78)  BP(mean): --  RR: 19 (24 Mar 2019 10:56) (18 - 19)  SpO2: 95% (24 Mar 2019 10:56) (93% - 96%)    GENERAL: NAD  CHEST/LUNG: CTA b/l   HEART: S1S2+ audible  ABDOMEN: Soft, Nontender, Nondistended; Bowel sounds present  EXTREMITIES:  no edema  CNS: AAO X 3  Psychiatry: normal mood    LABS:                        14.0   8.5   )-----------( 232      ( 24 Mar 2019 09:14 )             41.7     03-24    137  |  108<H>  |  22.0<H>  ----------------------------<  92  5.1   |  18.0<L>  |  0.89    Ca    11.9<H>      24 Mar 2019 09:14  Phos  1.1     03-23  Mg     2.4     03-24        Urinalysis Basic - ( 24 Mar 2019 07:50 )    Color: Yellow / Appearance: Clear / S.015 / pH: x  Gluc: x / Ketone: Negative  / Bili: Negative / Urobili: Negative mg/dL   Blood: x / Protein: 30 mg/dL / Nitrite: Negative   Leuk Esterase: Small / RBC: 0-2 /HPF / WBC 3-5   Sq Epi: x / Non Sq Epi: Occasional / Bacteria: Few          MEDICATIONS  (STANDING):  amLODIPine   Tablet 5 milliGRAM(s) Oral daily  aspirin enteric coated 81 milliGRAM(s) Oral daily  atorvastatin 10 milliGRAM(s) Oral at bedtime  heparin  Injectable 5000 Unit(s) SubCutaneous every 8 hours  levothyroxine 100 MICROGram(s) Oral daily  potassium acid phosphate/sodium acid phosphate tablet (K-PHOS No. 2) 1 Tablet(s) Oral four times a day with meals  vancomycin    Solution 250 milliGRAM(s) Oral every 6 hours    MEDICATIONS  (PRN):  acetaminophen   Tablet .. 650 milliGRAM(s) Oral every 6 hours PRN Temp greater or equal to 38C (100.4F)      RADIOLOGY & ADDITIONAL TEST

## 2019-03-25 ENCOUNTER — INPATIENT (INPATIENT)
Facility: HOSPITAL | Age: 84
LOS: 7 days | Discharge: HOME | DRG: 281 | End: 2019-04-02
Attending: HOSPITALIST | Admitting: HOSPITALIST
Payer: MEDICARE

## 2019-03-25 ENCOUNTER — TRANSCRIPTION ENCOUNTER (OUTPATIENT)
Age: 84
End: 2019-03-25

## 2019-03-25 VITALS
DIASTOLIC BLOOD PRESSURE: 64 MMHG | RESPIRATION RATE: 18 BRPM | SYSTOLIC BLOOD PRESSURE: 149 MMHG | OXYGEN SATURATION: 96 % | HEART RATE: 70 BPM | TEMPERATURE: 99 F

## 2019-03-25 VITALS
RESPIRATION RATE: 16 BRPM | TEMPERATURE: 99 F | WEIGHT: 139.99 LBS | DIASTOLIC BLOOD PRESSURE: 62 MMHG | HEIGHT: 60 IN | HEART RATE: 69 BPM | OXYGEN SATURATION: 94 % | SYSTOLIC BLOOD PRESSURE: 116 MMHG

## 2019-03-25 DIAGNOSIS — I25.9 CHRONIC ISCHEMIC HEART DISEASE, UNSPECIFIED: ICD-10-CM

## 2019-03-25 LAB
ALBUMIN SERPL ELPH-MCNC: 3.6 G/DL — SIGNIFICANT CHANGE UP (ref 3.3–5.2)
ALP SERPL-CCNC: 76 U/L — SIGNIFICANT CHANGE UP (ref 40–120)
ALT FLD-CCNC: 11 U/L — SIGNIFICANT CHANGE UP
ANION GAP SERPL CALC-SCNC: 9 MMOL/L — SIGNIFICANT CHANGE UP (ref 5–17)
ANION GAP SERPL CALC-SCNC: 9 MMOL/L — SIGNIFICANT CHANGE UP (ref 5–17)
AST SERPL-CCNC: 16 U/L — SIGNIFICANT CHANGE UP
BASOPHILS # BLD AUTO: 0 K/UL — SIGNIFICANT CHANGE UP (ref 0–0.2)
BASOPHILS NFR BLD AUTO: 0.3 % — SIGNIFICANT CHANGE UP (ref 0–2)
BILIRUB SERPL-MCNC: 0.3 MG/DL — LOW (ref 0.4–2)
BUN SERPL-MCNC: 24 MG/DL — HIGH (ref 8–20)
BUN SERPL-MCNC: 28 MG/DL — HIGH (ref 8–20)
CALCIUM SERPL-MCNC: 10.9 MG/DL — HIGH (ref 8.4–10.5)
CALCIUM SERPL-MCNC: 11.3 MG/DL — HIGH (ref 8.6–10.2)
CALCIUM SERPL-MCNC: 12 MG/DL — HIGH (ref 8.6–10.2)
CHLORIDE SERPL-SCNC: 104 MMOL/L — SIGNIFICANT CHANGE UP (ref 98–107)
CHLORIDE SERPL-SCNC: 104 MMOL/L — SIGNIFICANT CHANGE UP (ref 98–107)
CK SERPL-CCNC: 38 U/L — SIGNIFICANT CHANGE UP (ref 25–170)
CO2 SERPL-SCNC: 21 MMOL/L — LOW (ref 22–29)
CO2 SERPL-SCNC: 22 MMOL/L — SIGNIFICANT CHANGE UP (ref 22–29)
CREAT SERPL-MCNC: 0.97 MG/DL — SIGNIFICANT CHANGE UP (ref 0.5–1.3)
CREAT SERPL-MCNC: 1.05 MG/DL — SIGNIFICANT CHANGE UP (ref 0.5–1.3)
EOSINOPHIL # BLD AUTO: 0 K/UL — SIGNIFICANT CHANGE UP (ref 0–0.5)
EOSINOPHIL NFR BLD AUTO: 0.4 % — SIGNIFICANT CHANGE UP (ref 0–6)
GLUCOSE SERPL-MCNC: 112 MG/DL — SIGNIFICANT CHANGE UP (ref 70–115)
GLUCOSE SERPL-MCNC: 92 MG/DL — SIGNIFICANT CHANGE UP (ref 70–115)
HCT VFR BLD CALC: 40 % — SIGNIFICANT CHANGE UP (ref 37–47)
HGB BLD-MCNC: 13.3 G/DL — SIGNIFICANT CHANGE UP (ref 12–16)
INR BLD: 1.14 RATIO — SIGNIFICANT CHANGE UP (ref 0.88–1.16)
LACTATE BLDV-MCNC: 1.1 MMOL/L — SIGNIFICANT CHANGE UP (ref 0.5–2)
LYMPHOCYTES # BLD AUTO: 1.9 K/UL — SIGNIFICANT CHANGE UP (ref 1–4.8)
LYMPHOCYTES # BLD AUTO: 18.3 % — LOW (ref 20–55)
MCHC RBC-ENTMCNC: 31.7 PG — HIGH (ref 27–31)
MCHC RBC-ENTMCNC: 33.3 G/DL — SIGNIFICANT CHANGE UP (ref 32–36)
MCV RBC AUTO: 95.5 FL — SIGNIFICANT CHANGE UP (ref 81–99)
MONOCYTES # BLD AUTO: 1 K/UL — HIGH (ref 0–0.8)
MONOCYTES NFR BLD AUTO: 9.2 % — SIGNIFICANT CHANGE UP (ref 3–10)
NEUTROPHILS # BLD AUTO: 7.4 K/UL — SIGNIFICANT CHANGE UP (ref 1.8–8)
NEUTROPHILS NFR BLD AUTO: 71.5 % — SIGNIFICANT CHANGE UP (ref 37–73)
PHOSPHATE SERPL-MCNC: 2.6 MG/DL — SIGNIFICANT CHANGE UP (ref 2.4–4.7)
PLATELET # BLD AUTO: 215 K/UL — SIGNIFICANT CHANGE UP (ref 150–400)
POTASSIUM SERPL-MCNC: 4.7 MMOL/L — SIGNIFICANT CHANGE UP (ref 3.5–5.3)
POTASSIUM SERPL-MCNC: 5.1 MMOL/L — SIGNIFICANT CHANGE UP (ref 3.5–5.3)
POTASSIUM SERPL-SCNC: 4.7 MMOL/L — SIGNIFICANT CHANGE UP (ref 3.5–5.3)
POTASSIUM SERPL-SCNC: 5.1 MMOL/L — SIGNIFICANT CHANGE UP (ref 3.5–5.3)
PROT SERPL-MCNC: 8.1 G/DL — SIGNIFICANT CHANGE UP (ref 6.6–8.7)
PROTHROM AB SERPL-ACNC: 13.2 SEC — HIGH (ref 10–12.9)
PTH-INTACT FLD-MCNC: 178 PG/ML — HIGH (ref 15–65)
RBC # BLD: 4.19 M/UL — LOW (ref 4.4–5.2)
RBC # FLD: 13.6 % — SIGNIFICANT CHANGE UP (ref 11–15.6)
SODIUM SERPL-SCNC: 134 MMOL/L — LOW (ref 135–145)
SODIUM SERPL-SCNC: 135 MMOL/L — SIGNIFICANT CHANGE UP (ref 135–145)
WBC # BLD: 10.4 K/UL — SIGNIFICANT CHANGE UP (ref 4.8–10.8)
WBC # FLD AUTO: 10.4 K/UL — SIGNIFICANT CHANGE UP (ref 4.8–10.8)

## 2019-03-25 PROCEDURE — 84484 ASSAY OF TROPONIN QUANT: CPT

## 2019-03-25 PROCEDURE — 87486 CHLMYD PNEUM DNA AMP PROBE: CPT

## 2019-03-25 PROCEDURE — 87581 M.PNEUMON DNA AMP PROBE: CPT

## 2019-03-25 PROCEDURE — 87798 DETECT AGENT NOS DNA AMP: CPT

## 2019-03-25 PROCEDURE — 36415 COLL VENOUS BLD VENIPUNCTURE: CPT

## 2019-03-25 PROCEDURE — 93010 ELECTROCARDIOGRAM REPORT: CPT

## 2019-03-25 PROCEDURE — 87493 C DIFF AMPLIFIED PROBE: CPT

## 2019-03-25 PROCEDURE — 81001 URINALYSIS AUTO W/SCOPE: CPT

## 2019-03-25 PROCEDURE — 99285 EMERGENCY DEPT VISIT HI MDM: CPT | Mod: 25

## 2019-03-25 PROCEDURE — 70450 CT HEAD/BRAIN W/O DYE: CPT

## 2019-03-25 PROCEDURE — 87633 RESP VIRUS 12-25 TARGETS: CPT

## 2019-03-25 PROCEDURE — 99239 HOSP IP/OBS DSCHRG MGMT >30: CPT

## 2019-03-25 PROCEDURE — 87186 SC STD MICRODIL/AGAR DIL: CPT

## 2019-03-25 PROCEDURE — 97163 PT EVAL HIGH COMPLEX 45 MIN: CPT

## 2019-03-25 PROCEDURE — 74176 CT ABD & PELVIS W/O CONTRAST: CPT

## 2019-03-25 PROCEDURE — 84100 ASSAY OF PHOSPHORUS: CPT

## 2019-03-25 PROCEDURE — 82310 ASSAY OF CALCIUM: CPT

## 2019-03-25 PROCEDURE — 80048 BASIC METABOLIC PNL TOTAL CA: CPT

## 2019-03-25 PROCEDURE — 71045 X-RAY EXAM CHEST 1 VIEW: CPT

## 2019-03-25 PROCEDURE — 87507 IADNA-DNA/RNA PROBE TQ 12-25: CPT

## 2019-03-25 PROCEDURE — 83970 ASSAY OF PARATHORMONE: CPT

## 2019-03-25 PROCEDURE — 93005 ELECTROCARDIOGRAM TRACING: CPT

## 2019-03-25 PROCEDURE — 80053 COMPREHEN METABOLIC PANEL: CPT

## 2019-03-25 PROCEDURE — 85027 COMPLETE CBC AUTOMATED: CPT

## 2019-03-25 PROCEDURE — 87086 URINE CULTURE/COLONY COUNT: CPT

## 2019-03-25 PROCEDURE — 82550 ASSAY OF CK (CPK): CPT

## 2019-03-25 PROCEDURE — 99291 CRITICAL CARE FIRST HOUR: CPT

## 2019-03-25 PROCEDURE — 72125 CT NECK SPINE W/O DYE: CPT

## 2019-03-25 PROCEDURE — 83735 ASSAY OF MAGNESIUM: CPT

## 2019-03-25 RX ORDER — ATORVASTATIN CALCIUM 80 MG/1
10 TABLET, FILM COATED ORAL AT BEDTIME
Qty: 0 | Refills: 0 | Status: DISCONTINUED | OUTPATIENT
Start: 2019-03-25 | End: 2019-04-02

## 2019-03-25 RX ORDER — NITROGLYCERIN 6.5 MG
0.4 CAPSULE, EXTENDED RELEASE ORAL
Qty: 0 | Refills: 0 | Status: DISCONTINUED | OUTPATIENT
Start: 2019-03-25 | End: 2019-04-02

## 2019-03-25 RX ORDER — VANCOMYCIN HCL 1 G
250 VIAL (EA) INTRAVENOUS EVERY 6 HOURS
Qty: 0 | Refills: 0 | Status: DISCONTINUED | OUTPATIENT
Start: 2019-03-25 | End: 2019-04-02

## 2019-03-25 RX ORDER — ASPIRIN/CALCIUM CARB/MAGNESIUM 324 MG
325 TABLET ORAL ONCE
Qty: 0 | Refills: 0 | Status: COMPLETED | OUTPATIENT
Start: 2019-03-25 | End: 2019-03-25

## 2019-03-25 RX ORDER — SODIUM CHLORIDE 9 MG/ML
500 INJECTION INTRAMUSCULAR; INTRAVENOUS; SUBCUTANEOUS
Qty: 0 | Refills: 0 | Status: COMPLETED | OUTPATIENT
Start: 2019-03-25 | End: 2019-03-26

## 2019-03-25 RX ORDER — AMLODIPINE BESYLATE 2.5 MG/1
1 TABLET ORAL
Qty: 30 | Refills: 0 | OUTPATIENT
Start: 2019-03-25 | End: 2019-04-23

## 2019-03-25 RX ORDER — ASPIRIN/CALCIUM CARB/MAGNESIUM 324 MG
325 TABLET ORAL DAILY
Qty: 0 | Refills: 0 | Status: DISCONTINUED | OUTPATIENT
Start: 2019-03-25 | End: 2019-03-25

## 2019-03-25 RX ORDER — ASPIRIN/CALCIUM CARB/MAGNESIUM 324 MG
325 TABLET ORAL DAILY
Qty: 0 | Refills: 0 | Status: DISCONTINUED | OUTPATIENT
Start: 2019-03-26 | End: 2019-03-26

## 2019-03-25 RX ORDER — METOPROLOL TARTRATE 50 MG
12.5 TABLET ORAL
Qty: 0 | Refills: 0 | Status: DISCONTINUED | OUTPATIENT
Start: 2019-03-25 | End: 2019-03-27

## 2019-03-25 RX ORDER — SODIUM CHLORIDE 9 MG/ML
500 INJECTION INTRAMUSCULAR; INTRAVENOUS; SUBCUTANEOUS ONCE
Qty: 0 | Refills: 0 | Status: COMPLETED | OUTPATIENT
Start: 2019-03-25 | End: 2019-03-25

## 2019-03-25 RX ORDER — ENOXAPARIN SODIUM 100 MG/ML
40 INJECTION SUBCUTANEOUS DAILY
Qty: 0 | Refills: 0 | Status: DISCONTINUED | OUTPATIENT
Start: 2019-03-25 | End: 2019-03-26

## 2019-03-25 RX ORDER — LEVOTHYROXINE SODIUM 125 MCG
88 TABLET ORAL DAILY
Qty: 0 | Refills: 0 | Status: DISCONTINUED | OUTPATIENT
Start: 2019-03-25 | End: 2019-04-02

## 2019-03-25 RX ORDER — VANCOMYCIN HCL 1 G
250 VIAL (EA) INTRAVENOUS
Qty: 200 | Refills: 0 | OUTPATIENT
Start: 2019-03-25 | End: 2019-04-03

## 2019-03-25 RX ADMIN — AMLODIPINE BESYLATE 5 MILLIGRAM(S): 2.5 TABLET ORAL at 05:22

## 2019-03-25 RX ADMIN — HEPARIN SODIUM 5000 UNIT(S): 5000 INJECTION INTRAVENOUS; SUBCUTANEOUS at 05:22

## 2019-03-25 RX ADMIN — Medication 100 MICROGRAM(S): at 05:22

## 2019-03-25 RX ADMIN — SODIUM CHLORIDE 500 MILLILITER(S): 9 INJECTION INTRAMUSCULAR; INTRAVENOUS; SUBCUTANEOUS at 21:22

## 2019-03-25 RX ADMIN — Medication 250 MILLIGRAM(S): at 22:34

## 2019-03-25 RX ADMIN — Medication 325 MILLIGRAM(S): at 22:34

## 2019-03-25 RX ADMIN — Medication 250 MILLIGRAM(S): at 05:22

## 2019-03-25 RX ADMIN — SODIUM CHLORIDE 500 MILLILITER(S): 9 INJECTION INTRAMUSCULAR; INTRAVENOUS; SUBCUTANEOUS at 22:00

## 2019-03-25 RX ADMIN — Medication 1 TABLET(S): at 08:54

## 2019-03-25 NOTE — DISCHARGE NOTE PROVIDER - PROVIDER TOKENS
FREE:[LAST:[primary care],PHONE:[(   )    -],FAX:[(   )    -]],FREE:[LAST:[Endocrinology],PHONE:[(   )    -],FAX:[(   )    -]]

## 2019-03-25 NOTE — ED PROVIDER NOTE - OBJECTIVE STATEMENT
86 y/o F pt with significant PMHx of C-diff, Hypercholesterolemia, Hypertension, and Hypothyroid presents to the ED c/o s/p fall that occurred Thursday morning. Daughter at bedside states that her daughter came home to find the pt on the floor. Pt was admitted at [facility] for 5 days and was DC home today. Son at bedside notes that while trying to get her in the car today, there was a lot of difficulty. When they got home the pt experienced an episode of disorientation, weakness, and difficulty walking. Daughters at bedside state the disorientation has been going on for some time now but getting progressively worse. Her recent blood tests showed abnormalities with her levels. Denies abdominal pain, CP, or lethargy. Daughter notes that December 29th, the pt fell in her bathroom s/p her legs giving out. At that time she fractured two ribs, she was transferred to a rehab facility and was there for one month. Ever since that incident she now walks with a walker. No further complaints at this time.   Cardiologist- Mark

## 2019-03-25 NOTE — DISCHARGE NOTE NURSING/CASE MANAGEMENT/SOCIAL WORK - NSDCDPATPORTLINK_GEN_ALL_CORE
You can access the [facility] Patient Portal, offered by [facility], by registering with the following website: [email or web address]

## 2019-03-25 NOTE — ED STATDOCS - PROGRESS NOTE DETAILS
Lilian Ramirez for Dr Carley Augustine : Pt is an 86 y/o F, with PMHx of HLD, HTN, hypothyroidism, admitted to [facility] for C diff on 03/21/2019, discharged at 1400 today, presenting to the ED with c/o weakness. Hx obtained by family at bedside. She states the pt was unable to support her own weight after being taken to the car after she was discharged today. Pt lives in her own apartment with family members above and below her, but does not have full time care. Son requesting discharge to rehab if anything. denies fever, CP, and SOB. Sent to [facility] for further evaluation.

## 2019-03-25 NOTE — ED PROVIDER NOTE - CLINICAL SUMMARY MEDICAL DECISION MAKING FREE TEXT BOX
Pt initially brought to ER due to family concern that she is not safe at home due to generalized weakness, however, given abnormal troponin levels, increased from baseline, will admit for serial troponin as well as social work consultation for possible rehab placement.

## 2019-03-25 NOTE — H&P ADULT - HISTORY OF PRESENT ILLNESS
86 y/o female who was discharged today from [facility] around 2 pm after being admitted for syncopy and had c- diff was brought back by family as she was very weak when she came out of the car to her room. Family did not feel safe to leave her alone in the house. Pt. lives on first floor by herself and daughter lives upstairs. no new complaints otherwise. no cp reported. no abd. pain. no n/v. no diarrhea at this point. no cough, no sob, no fever. 84 y/o female who was discharged today from [facility] around 2 pm after being admitted for syncopy and had c- diff was brought back by family as she was very weak when she came out of the car to her room. Family did not feel safe to leave her alone in the house. Pt. lives on first floor by herself and daughter lives upstairs. no new complaints otherwise. no cp reported. no abd. pain. no n/v. no diarrhea at this point. no cough, no sob, no fever. pt. was followed by [facility] during recent admission.

## 2019-03-25 NOTE — ED PROVIDER NOTE - CARE PLAN
Principal Discharge DX:	Myocardial ischemia  Secondary Diagnosis:	Weakness  Secondary Diagnosis:	Clostridium difficile colitis

## 2019-03-25 NOTE — H&P ADULT - ASSESSMENT
pt. is admitted for     - generalized weakness. possible from recent hospitalization and deconditioning, some componemt of dehydration is there. gentle iv fluids. PT / SW onsults, possibly will benefit from rehab placement.     - Elevated troponin. pt. with no cp. will trend, aspirin given in ER, will use lopressor. echo from 1/2/19 as below. [facility] to follow, will get echo.   Summary:   1. Moderately enlarged left atrium.   2. There is mild concentric left ventricular hypertrophy.   3. Normal wall motion. Left ventricular ejection fraction, by visual   estimation, is 60 to 65%. Grade II diastolic dysfunction.   4. Elevated mean left atrial pressure. (LAP > 20 mm Hg based on AI jet)   5. Moderately enlarged right atrium.   6. Normal right ventricular size and function.   7. Mild mitral valve regurgitation.   8. Mild aortic regurgitation.   9. Trivial pericardial effusion.  10. Despite elevated left atrial pressures, the IVC is collapsible that   suggest other euvolemic cause such as coronary artery disease, or   uncontrolled blood pressure or dehydration. Suggest clinical correlation.    - c- diff, continue po  vancomycin.     - Essential htn, lopressor.     - UTi , pt's urine culture from 3/24/19 showed more than [id number] colonies gram negative rods, final ID to follow. I am not sure about Dr. Bass's plan about that as i did not find any recommendations about that, one dose of levaquin ordered and will request Dr. Bass , discharging physician to follow up on that and treat accordingly. pt. is admitted for     - generalized weakness. possible from recent hospitalization and deconditioning, some componemt of dehydration is there. gentle iv fluids. PT / SW onsults, possibly will benefit from rehab placement.     - Elevated troponin. pt. with no cp. will trend, aspirin given in ER, will use lopressor. echo from 1/2/19 as below. [facility] to follow, will get echo.   Summary:   1. Moderately enlarged left atrium.   2. There is mild concentric left ventricular hypertrophy.   3. Normal wall motion. Left ventricular ejection fraction, by visual   estimation, is 60 to 65%. Grade II diastolic dysfunction.   4. Elevated mean left atrial pressure. (LAP > 20 mm Hg based on AI jet)   5. Moderately enlarged right atrium.   6. Normal right ventricular size and function.   7. Mild mitral valve regurgitation.   8. Mild aortic regurgitation.   9. Trivial pericardial effusion.  10. Despite elevated left atrial pressures, the IVC is collapsible that   suggest other euvolemic cause such as coronary artery disease, or   uncontrolled blood pressure or dehydration. Suggest clinical correlation.    - c- diff, continue po  vancomycin.     - Essential htn, lopressor.     - UTi , pt's urine culture from 3/24/19 showed more than [id number] colonies gram negative rods, final ID to follow. I am not sure about Dr. Bass's plan about that as i did not find any recommendations about that, one dose of levaquin ordered and will request Dr. Bass , discharging physician to follow up on that and treat accordingly. will repeat u/a and cx.     - Hypercalcemia as per discharge note pt. has h/o hypercalcemia and follows with endocrinologist, tali arceo to be followed.

## 2019-03-25 NOTE — ED ADULT TRIAGE NOTE - CHIEF COMPLAINT QUOTE
Pt was just discharged from the floor 2 hours ago. As per family she is too weak ot go home. They contacted [facility] for her to be placed there and was told that the patient needs FOX. It was not done while she was jut admitted.

## 2019-03-25 NOTE — DISCHARGE NOTE PROVIDER - HOSPITAL COURSE
86 yo female with recurrent falls, s/p loop recorder in dec admitted for fall, weakness , ongoing diarrhea C diff positive started on po vanco , cardiology consulted loop recorder interrogated , no cardiac events , dehydration with prerenal azotemia iv fluid given, renal function improved, feeling better, tolerating diet, denied nausea, vomiting, diarrhea, seen by PT - okay to discharge home with home care. Pt is noted to have hypercalcemia, discussed with daughter Cindy, pt has h/o chronic hypercalcemia, being followed by endocrinology as an outpatient, recommend repeat BMP in 1 week and f/u with endocrinology as an outpatient         Problem/Plan - 1:    ·  Problem: Clostridium difficile diarrhea.  Plan: cont po vanco to complete 14 days         Problem/Plan - 2:    ·  Problem: Prerenal azotemia.  Plan: improved.          Problem/Plan - 3:    ·  Problem: Essential hypertension.  Plan: controlled , cont amlodipine          Problem/Plan - 4:    ·  Problem: Near syncope.  Plan: h/o syncope in dec with loop recorder     cardio input appreciated     likely due to dehydration  and deconditioning.          Problem/Plan - 5:    ·  Problem: hypercalcemia - chronic -  discussed with daughter Cindy, pt has h/o chronic hypercalcemia, being followed by endocrinology as an outpatient, recommend repeat BMP in 1 week and f/u with endocrinology as an outpatient

## 2019-03-25 NOTE — DISCHARGE NOTE PROVIDER - CARE PROVIDER_API CALL
primary care,   Phone: (   )    -  Fax: (   )    -  Follow Up Time:     Endocrinology,   Phone: (   )    -  Fax: (   )    -  Follow Up Time:

## 2019-03-25 NOTE — DISCHARGE NOTE PROVIDER - NSDCCPCAREPLAN_GEN_ALL_CORE_FT
PRINCIPAL DISCHARGE DIAGNOSIS  Diagnosis: Syncope  Assessment and Plan of Treatment: likely due to dehydration   keep yourself hydrated      SECONDARY DISCHARGE DIAGNOSES  Diagnosis: Electrolyte abnormality  Assessment and Plan of Treatment: Hypokalemia - resolved  Hypomagnessemia - resolved   Hypophosphatemia - resolved   Hypercalcemia - f/u BMP and endocrinology as an outpatient    Diagnosis: Clostridium difficile diarrhea  Assessment and Plan of Treatment: c/w PO vanco x 10 days    Diagnosis: Essential hypertension  Assessment and Plan of Treatment: start amlodiopine   monitor BP

## 2019-03-25 NOTE — H&P ADULT - NSHPPHYSICALEXAM_GEN_ALL_CORE
General: An elderly  female lying in bed not in distress.   HEENT: AT, NC. PERRL. intact EOM. no throat erythema or exudate. no ear discharge.   Neck: supple. no JVD.   Chest: CTA bilaterally  Heart: S1,S2. RRR. no heart murmur. mild edema of hands.   Abdomen: soft. non-tender. non-distended. + BS.   rectal : deferred at this point.   Ext: no calf tenderness. ROM of all ext. intact.   Neuro: AAO x3. no focal weakness. no speech disorder. Cns intact.   Skin: no rash noted. no warmth. no cyanosis. General: An elderly  female lying in bed not in distress.   HEENT: AT, NC. PERRL. intact EOM. no throat erythema or exudate. somewhat dry oral mucosa, no ear discharge.   Neck: supple. no JVD.   Chest: CTA bilaterally  Heart: S1,S2. RRR. no heart murmur. mild edema of hands.   Abdomen: soft. non-tender. non-distended. + BS.   rectal : deferred at this point.   Ext: no calf tenderness. ROM of all ext. intact.   Neuro: AAO x3. no focal weakness. no speech disorder. Cns intact.   Skin: no rash noted. no warmth. no cyanosis.

## 2019-03-26 PROBLEM — R55 SYNCOPE AND COLLAPSE: Chronic | Status: ACTIVE | Noted: 2019-03-21

## 2019-03-26 LAB
-  AMIKACIN: SIGNIFICANT CHANGE UP
-  AMPICILLIN/SULBACTAM: SIGNIFICANT CHANGE UP
-  AMPICILLIN: SIGNIFICANT CHANGE UP
-  AZTREONAM: SIGNIFICANT CHANGE UP
-  CEFAZOLIN: SIGNIFICANT CHANGE UP
-  CEFEPIME: SIGNIFICANT CHANGE UP
-  CEFOXITIN: SIGNIFICANT CHANGE UP
-  CEFTRIAXONE: SIGNIFICANT CHANGE UP
-  CIPROFLOXACIN: SIGNIFICANT CHANGE UP
-  ERTAPENEM: SIGNIFICANT CHANGE UP
-  GENTAMICIN: SIGNIFICANT CHANGE UP
-  IMIPENEM: SIGNIFICANT CHANGE UP
-  LEVOFLOXACIN: SIGNIFICANT CHANGE UP
-  MEROPENEM: SIGNIFICANT CHANGE UP
-  NITROFURANTOIN: SIGNIFICANT CHANGE UP
-  PIPERACILLIN/TAZOBACTAM: SIGNIFICANT CHANGE UP
-  TIGECYCLINE: SIGNIFICANT CHANGE UP
-  TOBRAMYCIN: SIGNIFICANT CHANGE UP
-  TRIMETHOPRIM/SULFAMETHOXAZOLE: SIGNIFICANT CHANGE UP
ANION GAP SERPL CALC-SCNC: 11 MMOL/L — SIGNIFICANT CHANGE UP (ref 5–17)
APPEARANCE UR: CLEAR — SIGNIFICANT CHANGE UP
BACTERIA # UR AUTO: ABNORMAL
BASOPHILS # BLD AUTO: 0 K/UL — SIGNIFICANT CHANGE UP (ref 0–0.2)
BASOPHILS NFR BLD AUTO: 0.6 % — SIGNIFICANT CHANGE UP (ref 0–2)
BILIRUB UR-MCNC: NEGATIVE — SIGNIFICANT CHANGE UP
BUN SERPL-MCNC: 24 MG/DL — HIGH (ref 8–20)
CALCIUM SERPL-MCNC: 11.7 MG/DL — HIGH (ref 8.6–10.2)
CHLORIDE SERPL-SCNC: 108 MMOL/L — HIGH (ref 98–107)
CK SERPL-CCNC: 29 U/L — SIGNIFICANT CHANGE UP (ref 25–170)
CK SERPL-CCNC: 30 U/L — SIGNIFICANT CHANGE UP (ref 25–170)
CO2 SERPL-SCNC: 18 MMOL/L — LOW (ref 22–29)
COLOR SPEC: YELLOW — SIGNIFICANT CHANGE UP
CREAT SERPL-MCNC: 0.88 MG/DL — SIGNIFICANT CHANGE UP (ref 0.5–1.3)
CULTURE RESULTS: SIGNIFICANT CHANGE UP
DIFF PNL FLD: ABNORMAL
EOSINOPHIL # BLD AUTO: 0.2 K/UL — SIGNIFICANT CHANGE UP (ref 0–0.5)
EOSINOPHIL NFR BLD AUTO: 2.2 % — SIGNIFICANT CHANGE UP (ref 0–6)
EPI CELLS # UR: SIGNIFICANT CHANGE UP
GLUCOSE SERPL-MCNC: 101 MG/DL — SIGNIFICANT CHANGE UP (ref 70–115)
GLUCOSE UR QL: NEGATIVE MG/DL — SIGNIFICANT CHANGE UP
HCT VFR BLD CALC: 37.3 % — SIGNIFICANT CHANGE UP (ref 37–47)
HGB BLD-MCNC: 12.4 G/DL — SIGNIFICANT CHANGE UP (ref 12–16)
KETONES UR-MCNC: NEGATIVE — SIGNIFICANT CHANGE UP
LEUKOCYTE ESTERASE UR-ACNC: ABNORMAL
LYMPHOCYTES # BLD AUTO: 1.5 K/UL — SIGNIFICANT CHANGE UP (ref 1–4.8)
LYMPHOCYTES # BLD AUTO: 22.3 % — SIGNIFICANT CHANGE UP (ref 20–55)
MCHC RBC-ENTMCNC: 31.5 PG — HIGH (ref 27–31)
MCHC RBC-ENTMCNC: 33.2 G/DL — SIGNIFICANT CHANGE UP (ref 32–36)
MCV RBC AUTO: 94.7 FL — SIGNIFICANT CHANGE UP (ref 81–99)
METHOD TYPE: SIGNIFICANT CHANGE UP
MONOCYTES # BLD AUTO: 0.7 K/UL — SIGNIFICANT CHANGE UP (ref 0–0.8)
MONOCYTES NFR BLD AUTO: 10.5 % — HIGH (ref 3–10)
NEUTROPHILS # BLD AUTO: 4.3 K/UL — SIGNIFICANT CHANGE UP (ref 1.8–8)
NEUTROPHILS NFR BLD AUTO: 64.3 % — SIGNIFICANT CHANGE UP (ref 37–73)
NITRITE UR-MCNC: POSITIVE
ORGANISM # SPEC MICROSCOPIC CNT: SIGNIFICANT CHANGE UP
ORGANISM # SPEC MICROSCOPIC CNT: SIGNIFICANT CHANGE UP
PH UR: 6 — SIGNIFICANT CHANGE UP (ref 5–8)
PLATELET # BLD AUTO: 189 K/UL — SIGNIFICANT CHANGE UP (ref 150–400)
POTASSIUM SERPL-MCNC: 4.5 MMOL/L — SIGNIFICANT CHANGE UP (ref 3.5–5.3)
POTASSIUM SERPL-SCNC: 4.5 MMOL/L — SIGNIFICANT CHANGE UP (ref 3.5–5.3)
PROT UR-MCNC: 15 MG/DL
RBC # BLD: 3.94 M/UL — LOW (ref 4.4–5.2)
RBC # FLD: 13.6 % — SIGNIFICANT CHANGE UP (ref 11–15.6)
RBC CASTS # UR COMP ASSIST: SIGNIFICANT CHANGE UP /HPF (ref 0–4)
SODIUM SERPL-SCNC: 137 MMOL/L — SIGNIFICANT CHANGE UP (ref 135–145)
SP GR SPEC: 1.02 — SIGNIFICANT CHANGE UP (ref 1.01–1.02)
SPECIMEN SOURCE: SIGNIFICANT CHANGE UP
TROPONIN T SERPL-MCNC: 0.14 NG/ML — HIGH (ref 0–0.06)
TROPONIN T SERPL-MCNC: 0.15 NG/ML — HIGH (ref 0–0.06)
UROBILINOGEN FLD QL: NEGATIVE MG/DL — SIGNIFICANT CHANGE UP
WBC # BLD: 6.8 K/UL — SIGNIFICANT CHANGE UP (ref 4.8–10.8)
WBC # FLD AUTO: 6.8 K/UL — SIGNIFICANT CHANGE UP (ref 4.8–10.8)
WBC UR QL: SIGNIFICANT CHANGE UP

## 2019-03-26 PROCEDURE — 99232 SBSQ HOSP IP/OBS MODERATE 35: CPT

## 2019-03-26 PROCEDURE — 70450 CT HEAD/BRAIN W/O DYE: CPT | Mod: 26

## 2019-03-26 RX ORDER — HEPARIN SODIUM 5000 [USP'U]/ML
5000 INJECTION INTRAVENOUS; SUBCUTANEOUS EVERY 12 HOURS
Qty: 0 | Refills: 0 | Status: DISCONTINUED | OUTPATIENT
Start: 2019-03-26 | End: 2019-04-02

## 2019-03-26 RX ORDER — ASPIRIN/CALCIUM CARB/MAGNESIUM 324 MG
325 TABLET ORAL DAILY
Qty: 0 | Refills: 0 | Status: DISCONTINUED | OUTPATIENT
Start: 2019-03-26 | End: 2019-04-02

## 2019-03-26 RX ORDER — AMLODIPINE BESYLATE 2.5 MG/1
5 TABLET ORAL DAILY
Qty: 0 | Refills: 0 | Status: DISCONTINUED | OUTPATIENT
Start: 2019-03-26 | End: 2019-04-02

## 2019-03-26 RX ADMIN — Medication 325 MILLIGRAM(S): at 11:43

## 2019-03-26 RX ADMIN — HEPARIN SODIUM 5000 UNIT(S): 5000 INJECTION INTRAVENOUS; SUBCUTANEOUS at 17:07

## 2019-03-26 RX ADMIN — Medication 88 MICROGRAM(S): at 06:03

## 2019-03-26 RX ADMIN — Medication 250 MILLIGRAM(S): at 06:03

## 2019-03-26 RX ADMIN — Medication 12.5 MILLIGRAM(S): at 06:03

## 2019-03-26 RX ADMIN — Medication 250 MILLIGRAM(S): at 11:43

## 2019-03-26 RX ADMIN — Medication 250 MILLIGRAM(S): at 17:06

## 2019-03-26 RX ADMIN — SODIUM CHLORIDE 70 MILLILITER(S): 9 INJECTION INTRAMUSCULAR; INTRAVENOUS; SUBCUTANEOUS at 06:03

## 2019-03-26 NOTE — ED ADULT NURSE REASSESSMENT NOTE - COMFORT CARE
meal provided
warm blanket provided/plan of care explained/repositioned
warm blanket provided/repositioned/plan of care explained

## 2019-03-26 NOTE — PROGRESS NOTE ADULT - ASSESSMENT
86 yo female with recurrent falls, s/p loop recorder in dec admitted for fall, weakness , ongoing diarrhea C diff positive started on po vanco , cardiology consulted loop recorder interrogated , no cardiac events , dehydration with prerenal azotemia iv fluid given This is 86 yo woman with recurrent falls, s/p loop recorder in dec admitted for fall, weakness, recently admitted with C diff positive  on po vanco, during last admission  cardiology consulted loop recorder interrogated , no cardiac events , dehydration. Pt is readmitted on 03/25 for weakness. urine c/s sent during last admisison growing E coli, pt denied urinary complaints, afebrile, normal WBC, will repeat u/a.    A/P    >C diff- c/w Po vanco to complete 14 days   Keep IVF    >urine c/s E coli - ?colonization- pt is asymptomatic, afebrile, normal WBC  repeat u/a - if positive will start abx  due to recent C diff will try to avoid unnecessary abx     >Fall - debility   Pt eval requested   Sw eval for rehab    >Positive troponin - noted on last admission  appreciate cardiology input -her troponin was mildly elevated the last admission-Pt denies cp/dyspnea and her ekg shows no acute ischemic signs. A very recent echo showed nl EF. She displays no evidence of chf/her renal function is good and there'[s no evidence of a PE. Given her DNR/I status-a further cardiac w/u is not indicated.    >HTN - start amlodipine , c/w Metoprolol   Monitor BP    >hypercalcemia - calcium 11.7,  - likely primary hyperparathyroidism   f/u endocrinology as an outpatient   c/w IVF, f/u BMP    >DVT PPX- heparin

## 2019-03-26 NOTE — PROGRESS NOTE ADULT - SUBJECTIVE AND OBJECTIVE BOX
Internal Medicine Hospitalist - Dr. Kali LEVI    [id number]    85y      Female    Patient is a 85y old  Female who presents with a chief complaint of generalized weakness (26 Mar 2019 07:47)    INTERVAL HPI/ OVERNIGHT EVENTS: Patient is seen and examined, afebrile,  denied abd. pain, nausea, vomiting, chest pain, SOB, urinary complaints    REVIEW OF SYSTEMS:    Denied fever, chills, abd. pain, nausea, vomiting, chest pain, SOB, headache, dizziness    PHYSICAL EXAM:    Vital Signs Last 24 Hrs  T(C): 36.8 (26 Mar 2019 06:11), Max: 37.1 (25 Mar 2019 16:32)  T(F): 98.2 (26 Mar 2019 06:11), Max: 98.7 (25 Mar 2019 16:32)  HR: 50 (26 Mar 2019 11:15) (50 - 69)  BP: 162/79 (26 Mar 2019 11:15) (116/62 - 162/79)  BP(mean): --  RR: 20 (26 Mar 2019 11:15) (16 - 20)  SpO2: 96% (26 Mar 2019 11:15) (94% - 96%)    GENERAL: NAD  CHEST/LUNG: CTA b/l   HEART: S1S2+ audible  ABDOMEN: Soft, Nontender, Nondistended; Bowel sounds present  EXTREMITIES:  no edema  CNS: AAO X 3  Psychiatry: normal mood    LABS:                        12.4   6.8   )-----------( 189      ( 26 Mar 2019 07:29 )             37.3     03-26    137  |  108<H>  |  24.0<H>  ----------------------------<  101  4.5   |  18.0<L>  |  0.88    Ca    11.7<H>      26 Mar 2019 07:29  Phos  2.6     03-25  Mg     2.2     03-25    TPro  8.1  /  Alb  3.6  /  TBili  0.3<L>  /  DBili  x   /  AST  16  /  ALT  11  /  AlkPhos  76  03-25    PT/INR - ( 25 Mar 2019 21:26 )   PT: 13.2 sec;   INR: 1.14 ratio        MEDICATIONS  (STANDING):  amLODIPine   Tablet 5 milliGRAM(s) Oral daily  aspirin enteric coated 325 milliGRAM(s) Oral daily  atorvastatin 10 milliGRAM(s) Oral at bedtime  heparin  Injectable 5000 Unit(s) SubCutaneous every 12 hours  levothyroxine 88 MICROGram(s) Oral daily  metoprolol tartrate 12.5 milliGRAM(s) Oral two times a day  vancomycin    Solution 250 milliGRAM(s) Oral every 6 hours    MEDICATIONS  (PRN):  nitroglycerin     SubLingual 0.4 milliGRAM(s) SubLingual every 5 minutes PRN Chest Pain      RADIOLOGY & ADDITIONAL TEST

## 2019-03-26 NOTE — ED ADULT NURSE REASSESSMENT NOTE - NS ED NURSE REASSESS COMMENT FT1
PMD at bedside speaking with son.
Pt resting comfortably in stretcher, resp even and unlabored, son @ bedside, VSS, linens/pt pamper changed, no BM noted, pt aware of plan of care, offers no complaints at this time, will continue to monitor.
assumed care of pt   pt rec'd resting comfortably on stretcher  son at bedside   Dr Urbina at beside   CM shows nsr nsb 50 70  no complaints  isolation maintained.....  as per son HCP [facility] [phone]  pt is DNR/DNI  'I think my sisters have that form"   son calling/texting sisters.  pt without compalints.   plan of care discussed  needs anticipated.
pt with swollen hands 'they are always that way' per son and pt   20g R wrist with NS infusing.   will cont to monitor
meal tray given to pt. daughter remains at bedside.
pt is sinus stepan on cardiac monitor. Pt is resting in bed comfortably at this time, no apparent distress noted at this time. pt safety maintained. Pt denies any complaints at this time. pt educated on plan of care, plan of care taught back to RN. plan of care education deemed proficient through successful teach back. will continue to reeducate pt regarding plan of care.
pt remains Normal Sinus Rhythm on cardiac monitor. son remains at bedside. Pt is resting in bed comfortably at this time, no apparent distress noted at this time. pt safety maintained. Pt denies any complaints at this time. pt educated on plan of care, plan of care taught back to RN. plan of care education deemed proficient through successful teach back. will continue to reeducate pt regarding plan of care.
pt care assumed at 1110, no apparent distress noted at this time, charting as noted. pt received A&Ox2 resting in bed comfortably with son at bedside. pt denies complaints at this time. pt is sinus stepan on cardiac monitor. Son updated on plan of care. vital signs remain stable. saftey maintained.

## 2019-03-26 NOTE — CONSULT NOTE ADULT - SUBJECTIVE AND OBJECTIVE BOX
Chief Complaint: 86 yo F with generalized weakness.    HPI: Old records reviewed. Pt just dc'd from [facility] 3/25 after w/u for fall-found to have C-dif and placed on oral Vanco. Pt was unable to get out of car and brought back to the ER. Her troponin was noted to be elevated (was also during the last adm). Pt deneis any cp or dyspnea. PMH neg for MI angina (had neg neg cardiac PET in 2016). PMH+ hpt but no dm/cva/syncope or seizure disorder. Hx of falling and had rib fx's in Dec. when an IRL was implanted. No hx of pulmonary/renal/hepatic/heme hx. Hx of hypothyroidism and hyperca++. No surgery. Nvr smoked/no etoh. Allergy to pcn. Denies edema orthopnea or pnd. Chronic hand swelling.      PAST MEDICAL & SURGICAL HISTORY:  Near syncope  Hypertension  Hypercholesterolemia  Hypothyroid  No significant past surgical history      PREVIOUS DIAGNOSTIC TESTING:      ECHO  FINDINGS:    STRESS  FINDINGS:    CATHETERIZATION  FINDINGS:    MEDICATIONS  (STANDING):  aspirin enteric coated 325 milliGRAM(s) Oral daily  atorvastatin 10 milliGRAM(s) Oral at bedtime  heparin  Injectable 5000 Unit(s) SubCutaneous every 12 hours  levothyroxine 88 MICROGram(s) Oral daily  metoprolol tartrate 12.5 milliGRAM(s) Oral two times a day  vancomycin    Solution 250 milliGRAM(s) Oral every 6 hours    MEDICATIONS  (PRN):  nitroglycerin     SubLingual 0.4 milliGRAM(s) SubLingual every 5 minutes PRN Chest Pain      FAMILY HISTORY:  FH: stroke: her mother  at age 70&#x27;s  FH: heart disease: father  when she was  9 , does not remember his exact age when       SOCIAL HISTORY:    CIGARETTES:    ALCOHOL:    ROS: Negative other than as mentioned in HPI.    Vital Signs Last 24 Hrs  T(C): 36.8 (26 Mar 2019 06:11), Max: 37.3 (25 Mar 2019 11:16)  T(F): 98.2 (26 Mar 2019 06:11), Max: 99.2 (25 Mar 2019 11:16)  HR: 60 (26 Mar 2019 06:11) (60 - 70)  BP: 151/67 (26 Mar 2019 06:11) (116/62 - 151/67)  BP(mean): --  RR: 18 (26 Mar 2019 06:11) (16 - 18)  SpO2: 95% (26 Mar 2019 06:11) (94% - 96%)    PHYSICAL EXAM:  General: Appears well developed, well nourished alert and cooperative.  HEENT: Head; normocephalic, atraumatic.  Eyes;   Pupils reactive, cornea wnl.  Neck; Supple, no nodes adenopathy, no NVD or carotid bruit or thyromegaly.  CARDIOVASCULAR; No murmur, rub, gallop or lift. Normal S1 and S2.  LUNGS; No rales, rhonchi or wheeze. Normal breath sounds bilaterally.  ABDOMEN ; Soft, nontender without mass or organomegaly. bowel sounds normoactive.  EXTREMITIES; No clubbing, cyanosis or edema. Distal pulses wnl. ROM normal.  SKIN; warm and dry with normal turgor.  NEURO; Alert/oriented x 3/normal motor exam. No pathologic reflexes.    PSYCH; normal affect.            INTERPRETATION OF TELEMETRY:    ECG:    I&O's Detail      LABS:                        13.3   10.4  )-----------( 215      ( 25 Mar 2019 19:46 )             40.0     03-    135  |  104  |  28.0<H>  ----------------------------<  112  5.1   |  22.0  |  1.05    Ca    12.0<H>      25 Mar 2019 19:46  Phos  2.6     -  Mg     2.2     -25    TPro  8.1  /  Alb  3.6  /  TBili  0.3<L>  /  DBili  x   /  AST  16  /  ALT  11  /  AlkPhos  76  03-25    CARDIAC MARKERS ( 25 Mar 2019 19:46 )  x     / 0.18 ng/mL / 38 U/L / x     / x          PT/INR - ( 25 Mar 2019 21:26 )   PT: 13.2 sec;   INR: 1.14 ratio           Urinalysis Basic - ( 24 Mar 2019 07:50 )    Color: Yellow / Appearance: Clear / S.015 / pH: x  Gluc: x / Ketone: Negative  / Bili: Negative / Urobili: Negative mg/dL   Blood: x / Protein: 30 mg/dL / Nitrite: Negative   Leuk Esterase: Small / RBC: 0-2 /HPF / WBC 3-5   Sq Epi: x / Non Sq Epi: Occasional / Bacteria: Few      I&O's Summary      RADIOLOGY & ADDITIONAL STUDIES: Chief Complaint: 84 yo F with generalized weakness.    HPI: Old records reviewed. Pt just dc'd from [facility] 3/25 after w/u for fall-found to have C-dif and placed on oral Vanco. Pt was unable to get out of car and brought back to the ER. Her troponin was noted to be elevated (was also during the last adm). Pt deneis any cp or dyspnea. PMH neg for MI angina (had neg neg cardiac PET in 2016). PMH+ hpt but no dm/cva/syncope or seizure disorder. Hx of falling and had rib fx's in Dec. when an IRL was implanted. No hx of pulmonary/renal/hepatic/heme hx. Hx of hypothyroidism and hyperca++. No surgery. Nvr smoked/no etoh. Allergy to pcn. Denies edema orthopnea or pnd. Chronic hand swelling. Pt is a DNR/I. OP meds-oral vanco/norvasc/5/lipitor 10/81 asa/synthroid 88.      PAST MEDICAL & SURGICAL HISTORY:  Near syncope  Hypertension  Hypercholesterolemia  Hypothyroid  No significant past surgical history      PREVIOUS DIAGNOSTIC TESTING:      ECHO  FINDINGS: LVEF 60+%/mild MR    STRESS  FINDINGS: see above    CATHETERIZATION  FINDINGS:    MEDICATIONS  (STANDING):  aspirin enteric coated 325 milliGRAM(s) Oral daily  atorvastatin 10 milliGRAM(s) Oral at bedtime  heparin  Injectable 5000 Unit(s) SubCutaneous every 12 hours  levothyroxine 88 MICROGram(s) Oral daily  metoprolol tartrate 12.5 milliGRAM(s) Oral two times a day  vancomycin    Solution 250 milliGRAM(s) Oral every 6 hours    MEDICATIONS  (PRN):  nitroglycerin     SubLingual 0.4 milliGRAM(s) SubLingual every 5 minutes PRN Chest Pain      FAMILY HISTORY:  FH: stroke: her mother  at age 70&#x27;s  FH: heart disease: father  when she was  9 , does not remember his exact age when       SOCIAL HISTORY:    CIGARETTES: 0    ALCOHOL: 0    ROS: Negative other than as mentioned in HPI.    Vital Signs Last 24 Hrs  T(C): 36.8 (26 Mar 2019 06:11), Max: 37.3 (25 Mar 2019 11:16)  T(F): 98.2 (26 Mar 2019 06:11), Max: 99.2 (25 Mar 2019 11:16)  HR: 50 reg (26 Mar 2019 06:11) (60 - 70)  BP: 140/70 manually la (26 Mar 2019 06:11) (116/62 - 151/67)  BP(mean): --  RR: 12 flat ora (26 Mar 2019 06:11) (16 - 18)  SpO2: 95% (26 Mar 2019 06:11) (94% - 96%)    PHYSICAL EXAM:  General: Appears well developed, well nourished alert and cooperative. elderly afebrile [place] F nad.  HEENT: Head; normocephalic, atraumatic. edentulous  Eyes;   Pupils reactive, cornea wnl.  Neck; Supple, no nodes adenopathy, no NVD or carotid bruit or thyromegaly.  CARDIOVASCULAR; No murmur, rub, gallop or lift. Normal S1 and S2.  LUNGS; No rales, rhonchi or wheeze. Normal breath sounds bilaterally.  ABDOMEN ; Soft, nontender without mass or organomegaly. bowel sounds normoactive.  EXTREMITIES; No clubbing, cyanosis. Mild edema of both hands but not legs. Distal pulses not felt. ROM normal.  SKIN; warm and dry with normal turgor.  NEURO; Alert/oriented x 3/normal motor exam. No pathologic reflexes.    PSYCH; normal affect.            INTERPRETATION OF TELEMETRY:    ECG: SR 65 RBBB lahb  CXR 3/21-clear lungs  CT head-atrophy  I&O's Detail      LABS:                        13.3   10.4  )-----------( 215      ( 25 Mar 2019 19:46 )             40.0     03-25    135  |  104  |  28.0<H>  ----------------------------<  112  5.1   |  22.0  |  1.05    Ca    12.0<H>      25 Mar 2019 19:46  Phos  2.6     03-25  Mg     2.2     03-25    TPro  8.1  /  Alb  3.6  /  TBili  0.3<L>  /  DBili  x   /  AST  16  /  ALT  11  /  AlkPhos  76  03-25    CARDIAC MARKERS ( 25 Mar 2019 19:46 )  x     / 0.18 ng/mL / 38 U/L / x     / x          PT/INR - ( 25 Mar 2019 21:26 )   PT: 13.2 sec;   INR: 1.14 ratio           Urinalysis Basic - ( 24 Mar 2019 07:50 )    Color: Yellow / Appearance: Clear / S.015 / pH: x  Gluc: x / Ketone: Negative  / Bili: Negative / Urobili: Negative mg/dL   Blood: x / Protein: 30 mg/dL / Nitrite: Negative   Leuk Esterase: Small / RBC: 0-2 /HPF / WBC 3-5   Sq Epi: x / Non Sq Epi: Occasional / Bacteria: Few      I&O's Summary      RADIOLOGY & ADDITIONAL STUDIES:

## 2019-03-27 LAB
ANION GAP SERPL CALC-SCNC: 8 MMOL/L — SIGNIFICANT CHANGE UP (ref 5–17)
BUN SERPL-MCNC: 27 MG/DL — HIGH (ref 8–20)
CALCIUM SERPL-MCNC: 11.4 MG/DL — HIGH (ref 8.6–10.2)
CHLORIDE SERPL-SCNC: 104 MMOL/L — SIGNIFICANT CHANGE UP (ref 98–107)
CO2 SERPL-SCNC: 21 MMOL/L — LOW (ref 22–29)
CREAT SERPL-MCNC: 0.88 MG/DL — SIGNIFICANT CHANGE UP (ref 0.5–1.3)
GLUCOSE SERPL-MCNC: 103 MG/DL — SIGNIFICANT CHANGE UP (ref 70–115)
POTASSIUM SERPL-MCNC: 4.2 MMOL/L — SIGNIFICANT CHANGE UP (ref 3.5–5.3)
POTASSIUM SERPL-SCNC: 4.2 MMOL/L — SIGNIFICANT CHANGE UP (ref 3.5–5.3)
SODIUM SERPL-SCNC: 133 MMOL/L — LOW (ref 135–145)

## 2019-03-27 PROCEDURE — 99232 SBSQ HOSP IP/OBS MODERATE 35: CPT

## 2019-03-27 RX ADMIN — Medication 88 MICROGRAM(S): at 05:25

## 2019-03-27 RX ADMIN — ATORVASTATIN CALCIUM 10 MILLIGRAM(S): 80 TABLET, FILM COATED ORAL at 00:28

## 2019-03-27 RX ADMIN — Medication 250 MILLIGRAM(S): at 00:28

## 2019-03-27 RX ADMIN — Medication 250 MILLIGRAM(S): at 23:23

## 2019-03-27 RX ADMIN — Medication 12.5 MILLIGRAM(S): at 05:25

## 2019-03-27 RX ADMIN — Medication 325 MILLIGRAM(S): at 10:03

## 2019-03-27 RX ADMIN — Medication 250 MILLIGRAM(S): at 11:01

## 2019-03-27 RX ADMIN — ATORVASTATIN CALCIUM 10 MILLIGRAM(S): 80 TABLET, FILM COATED ORAL at 21:39

## 2019-03-27 RX ADMIN — AMLODIPINE BESYLATE 5 MILLIGRAM(S): 2.5 TABLET ORAL at 05:25

## 2019-03-27 RX ADMIN — Medication 250 MILLIGRAM(S): at 17:09

## 2019-03-27 RX ADMIN — Medication 250 MILLIGRAM(S): at 05:26

## 2019-03-27 RX ADMIN — HEPARIN SODIUM 5000 UNIT(S): 5000 INJECTION INTRAVENOUS; SUBCUTANEOUS at 21:39

## 2019-03-27 NOTE — PROGRESS NOTE ADULT - ASSESSMENT
This is 84 yo woman with recurrent falls, s/p loop recorder in dec admitted for fall, weakness, recently admitted with C diff positive  on po vanco, during last admission  cardiology consulted loop recorder interrogated , no cardiac events , dehydration. Pt is readmitted on 03/25 for weakness. urine c/s sent during last admisison growing E coli, pt denied urinary complaints, afebrile, normal WBC, repeat u/a no pyouria, will hold on abx for now    A/P    >C diff- c/w Po vanco to complete 14 days     >urine c/s E coli - ?colonization- pt is asymptomatic, afebrile, normal WBC  repeat u/a - no pyouria - no need of abx  due to recent C diff will try to avoid unnecessary abx     >Fall - debility - multifactorial -  due to age related physical debility and CDIFF  Pt eval requested   Sw eval for rehab    >Positive troponin - noted on last admission  appreciate cardiology input -troponin flat   repeat TTE today  c/w aspirin, statin   HR bradycardia ~ 40 - stopped Metoprolol     >HTN - start amlodipine , c/w Metoprolol   Monitor BP    >Chronic hypercalcemia -   - likely primary hyperparathyroidism   f/u endocrinology as an outpatient   c/w IVF, f/u BMP    >DVT PPX- heparin

## 2019-03-27 NOTE — CDI QUERY NOTE - NSCDIOTHERTXTBX_GEN_ALL_CORE_HH
85 year old female noted with disorientation, weakness, and difficulty walking.     Can you please clarify the etiology of generalized weakness?  A) Generalized weakness, multifactorial due to age related physical debility, UTI, and CDIFF  B) Generalized weakness due to Age related physical debility   C) Generalized weakness due to UTI  D) Generalized weakness due to CDIFF  E) Other, please specify   F) Etiology unable to be determined  G) Not clinically significant 85 year old female noted with disorientation, weakness, and difficulty walking.     Can you please clarify the etiology of the patients generalized weakness?  A) Generalized weakness, multifactorial due to age related physical debility, UTI, and CDIFF  B) Generalized weakness due to Age related physical debility   C) Generalized weakness due to UTI  D) Generalized weakness due to CDIFF  E) Other, please specify   F) Etiology unable to be determined  G) Not clinically significant

## 2019-03-27 NOTE — PHYSICAL THERAPY INITIAL EVALUATION ADULT - ADDITIONAL COMMENTS
Pt lives in a mother-daughter house on the first floor with a few steps to enter with a HR. no steps inside. Per son - pt has been declining since fall in December.

## 2019-03-27 NOTE — PROGRESS NOTE ADULT - SUBJECTIVE AND OBJECTIVE BOX
Chief Complaint: recent course reviewed.    HPI: Pt offers no complaints-eating well no diarrhea-being treated for c-dif. No cp or dyspnea.    PAST MEDICAL & SURGICAL HISTORY:  Near syncope  Hypertension  Hypercholesterolemia  Hypothyroid  No significant past surgical history      PREVIOUS DIAGNOSTIC TESTING:      ECHO  FINDINGS:    STRESS  FINDINGS:    CATHETERIZATION  FINDINGS:    MEDICATIONS  (STANDING):  amLODIPine   Tablet 5 milliGRAM(s) Oral daily  aspirin enteric coated 325 milliGRAM(s) Oral daily  atorvastatin 10 milliGRAM(s) Oral at bedtime  heparin  Injectable 5000 Unit(s) SubCutaneous every 12 hours  levothyroxine 88 MICROGram(s) Oral daily  vancomycin    Solution 250 milliGRAM(s) Oral every 6 hours    MEDICATIONS  (PRN):  nitroglycerin     SubLingual 0.4 milliGRAM(s) SubLingual every 5 minutes PRN Chest Pain      FAMILY HISTORY:  FH: stroke: her mother  at age 70&#x27;s  FH: heart disease: father  when she was  9 , does not remember his exact age when       ROS: Negative other than as mentioned in HPI.    Vital Signs Last 24 Hrs  T(C): 36.7 (27 Mar 2019 10:12), Max: 37.7 (27 Mar 2019 05:12)  T(F): 98.1 (27 Mar 2019 10:12), Max: 99.8 (27 Mar 2019 05:12)  HR: 55 reg (27 Mar 2019 10:12) (50 - 71)  BP: 118/67 (27 Mar 2019 10:12) (108/58 - 162/79)  BP(mean): --  RR: 12 flat ora (27 Mar 2019 10:12) (16 - 20)  SpO2: 92% (27 Mar 2019 05:12) (92% - 98%)    PHYSICAL EXAM:  General: Appears well developed, well nourished alert and cooperative. afebrile elderly F nad.  HEENT: Head; normocephalic, atraumatic.  Eyes;   Pupils reactive, cornea wnl.  Neck; Supple, no nodes adenopathy, no NVD or carotid bruit or thyromegaly.  CARDIOVASCULAR; No murmur, rub, gallop or lift. Normal S1 and S2.  LUNGS; No rales, rhonchi or wheeze. Normal breath sounds bilaterally.  ABDOMEN ; Soft, nontender without mass or organomegaly. bowel sounds normoactive.  EXTREMITIES; No clubbing, cyanosis or edema.   SKIN; warm and dry with normal turgor.  NEURO; Alert/oriented x 3/normal motor exam.   PSYCH; normal affect.            INTERPRETATION OF TELEMETRY:    ECG: monitor shows sR ivcd    I&O's Detail      LABS:                        12.4   6.8   )-----------( 189      ( 26 Mar 2019 07:29 )             37.3     03-    133<L>  |  104  |  27.0<H>  ----------------------------<  103  4.2   |  21.0<L>  |  0.88    Ca    11.4<H>      27 Mar 2019 06:54  Mg     2.2     03-25    TPro  8.1  /  Alb  3.6  /  TBili  0.3<L>  /  DBili  x   /  AST  16  /  ALT  11  /  AlkPhos  76  03-25    CARDIAC MARKERS ( 26 Mar 2019 13:03 )  x     / 0.15 ng/mL / 29 U/L / x     / x      CARDIAC MARKERS ( 26 Mar 2019 07:29 )  x     / 0.14 ng/mL / 30 U/L / x     / x      CARDIAC MARKERS ( 25 Mar 2019 19:46 )  x     / 0.18 ng/mL / 38 U/L / x     / x          PT/INR - ( 25 Mar 2019 21:26 )   PT: 13.2 sec;   INR: 1.14 ratio           Urinalysis Basic - ( 26 Mar 2019 12:57 )    Color: Yellow / Appearance: Clear / S.020 / pH: x  Gluc: x / Ketone: Negative  / Bili: Negative / Urobili: Negative mg/dL   Blood: x / Protein: 15 mg/dL / Nitrite: Positive   Leuk Esterase: Trace / RBC: 0-2 /HPF / WBC 3-5   Sq Epi: x / Non Sq Epi: Occasional / Bacteria: Moderate      I&O's Summary      RADIOLOGY & ADDITIONAL STUDIES:

## 2019-03-27 NOTE — PROGRESS NOTE ADULT - ASSESSMENT
1. 86 yo F readmitted after same day discharge for weakness,  2. elevated troponins (were similar last admission) in absence of cardiac sx's or complaints. She's scheduled for an echo today. No invasive w/u is planned.  3. DNR/I

## 2019-03-27 NOTE — PROGRESS NOTE ADULT - SUBJECTIVE AND OBJECTIVE BOX
Internal Medicine Hospitalist - Dr. Kali LEVI    [id number]    85y      Female    Patient is a 85y old  Female who presents with a chief complaint of generalized weakness (27 Mar 2019 10:44)    INTERVAL HPI/ OVERNIGHT EVENTS: Patient is seen and examined, denied abd. pain, nausea and vomiting    REVIEW OF SYSTEMS:    Denied fever, chills, abd. pain, nausea, vomiting, chest pain, SOB, headache, dizziness    PHYSICAL EXAM:    Vital Signs Last 24 Hrs  T(C): 36.7 (27 Mar 2019 10:12), Max: 37.7 (27 Mar 2019 05:12)  T(F): 98.1 (27 Mar 2019 10:12), Max: 99.8 (27 Mar 2019 05:12)  HR: 52 (27 Mar 2019 10:12) (52 - 71)  BP: 118/67 (27 Mar 2019 10:12) (108/58 - 140/64)  BP(mean): --  RR: 17 (27 Mar 2019 10:12) (16 - 18)  SpO2: 92% (27 Mar 2019 05:12) (92% - 98%)    GENERAL: NAD  CHEST/LUNG: CTA b/l   HEART: S1S2+ audible  ABDOMEN: Soft, Nontender, Nondistended; Bowel sounds present  EXTREMITIES:  no edema  CNS: Awake    LABS:                        12.4   6.8   )-----------( 189      ( 26 Mar 2019 07:29 )             37.3     03-27    133<L>  |  104  |  27.0<H>  ----------------------------<  103  4.2   |  21.0<L>  |  0.88    Ca    11.4<H>      27 Mar 2019 06:54  Mg     2.2     03-25    TPro  8.1  /  Alb  3.6  /  TBili  0.3<L>  /  DBili  x   /  AST  16  /  ALT  11  /  AlkPhos  76  03-25    PT/INR - ( 25 Mar 2019 21:26 )   PT: 13.2 sec;   INR: 1.14 ratio           Urinalysis Basic - ( 26 Mar 2019 12:57 )    Color: Yellow / Appearance: Clear / S.020 / pH: x  Gluc: x / Ketone: Negative  / Bili: Negative / Urobili: Negative mg/dL   Blood: x / Protein: 15 mg/dL / Nitrite: Positive   Leuk Esterase: Trace / RBC: 0-2 /HPF / WBC 3-5   Sq Epi: x / Non Sq Epi: Occasional / Bacteria: Moderate          MEDICATIONS  (STANDING):  amLODIPine   Tablet 5 milliGRAM(s) Oral daily  aspirin enteric coated 325 milliGRAM(s) Oral daily  atorvastatin 10 milliGRAM(s) Oral at bedtime  heparin  Injectable 5000 Unit(s) SubCutaneous every 12 hours  levothyroxine 88 MICROGram(s) Oral daily  vancomycin    Solution 250 milliGRAM(s) Oral every 6 hours    MEDICATIONS  (PRN):  nitroglycerin     SubLingual 0.4 milliGRAM(s) SubLingual every 5 minutes PRN Chest Pain      RADIOLOGY & ADDITIONAL TEST

## 2019-03-28 LAB
-  AMIKACIN: SIGNIFICANT CHANGE UP
-  AMPICILLIN/SULBACTAM: SIGNIFICANT CHANGE UP
-  AMPICILLIN: SIGNIFICANT CHANGE UP
-  AZTREONAM: SIGNIFICANT CHANGE UP
-  CEFAZOLIN: SIGNIFICANT CHANGE UP
-  CEFEPIME: SIGNIFICANT CHANGE UP
-  CEFOXITIN: SIGNIFICANT CHANGE UP
-  CEFTRIAXONE: SIGNIFICANT CHANGE UP
-  CIPROFLOXACIN: SIGNIFICANT CHANGE UP
-  ERTAPENEM: SIGNIFICANT CHANGE UP
-  GENTAMICIN: SIGNIFICANT CHANGE UP
-  IMIPENEM: SIGNIFICANT CHANGE UP
-  LEVOFLOXACIN: SIGNIFICANT CHANGE UP
-  MEROPENEM: SIGNIFICANT CHANGE UP
-  NITROFURANTOIN: SIGNIFICANT CHANGE UP
-  PIPERACILLIN/TAZOBACTAM: SIGNIFICANT CHANGE UP
-  TIGECYCLINE: SIGNIFICANT CHANGE UP
-  TOBRAMYCIN: SIGNIFICANT CHANGE UP
-  TRIMETHOPRIM/SULFAMETHOXAZOLE: SIGNIFICANT CHANGE UP
ANION GAP SERPL CALC-SCNC: 9 MMOL/L — SIGNIFICANT CHANGE UP (ref 5–17)
BUN SERPL-MCNC: 29 MG/DL — HIGH (ref 8–20)
CALCIUM SERPL-MCNC: 11.9 MG/DL — HIGH (ref 8.6–10.2)
CHLORIDE SERPL-SCNC: 103 MMOL/L — SIGNIFICANT CHANGE UP (ref 98–107)
CO2 SERPL-SCNC: 24 MMOL/L — SIGNIFICANT CHANGE UP (ref 22–29)
CREAT SERPL-MCNC: 0.89 MG/DL — SIGNIFICANT CHANGE UP (ref 0.5–1.3)
CULTURE RESULTS: SIGNIFICANT CHANGE UP
GLUCOSE SERPL-MCNC: 91 MG/DL — SIGNIFICANT CHANGE UP (ref 70–115)
HCT VFR BLD CALC: 36.7 % — LOW (ref 37–47)
HGB BLD-MCNC: 12 G/DL — SIGNIFICANT CHANGE UP (ref 12–16)
MCHC RBC-ENTMCNC: 31.3 PG — HIGH (ref 27–31)
MCHC RBC-ENTMCNC: 32.7 G/DL — SIGNIFICANT CHANGE UP (ref 32–36)
MCV RBC AUTO: 95.8 FL — SIGNIFICANT CHANGE UP (ref 81–99)
METHOD TYPE: SIGNIFICANT CHANGE UP
ORGANISM # SPEC MICROSCOPIC CNT: SIGNIFICANT CHANGE UP
ORGANISM # SPEC MICROSCOPIC CNT: SIGNIFICANT CHANGE UP
PLATELET # BLD AUTO: 224 K/UL — SIGNIFICANT CHANGE UP (ref 150–400)
POTASSIUM SERPL-MCNC: 4.7 MMOL/L — SIGNIFICANT CHANGE UP (ref 3.5–5.3)
POTASSIUM SERPL-SCNC: 4.7 MMOL/L — SIGNIFICANT CHANGE UP (ref 3.5–5.3)
RBC # BLD: 3.83 M/UL — LOW (ref 4.4–5.2)
RBC # FLD: 13.6 % — SIGNIFICANT CHANGE UP (ref 11–15.6)
SODIUM SERPL-SCNC: 136 MMOL/L — SIGNIFICANT CHANGE UP (ref 135–145)
SPECIMEN SOURCE: SIGNIFICANT CHANGE UP
WBC # BLD: 6.4 K/UL — SIGNIFICANT CHANGE UP (ref 4.8–10.8)
WBC # FLD AUTO: 6.4 K/UL — SIGNIFICANT CHANGE UP (ref 4.8–10.8)

## 2019-03-28 PROCEDURE — 99232 SBSQ HOSP IP/OBS MODERATE 35: CPT

## 2019-03-28 PROCEDURE — 93306 TTE W/DOPPLER COMPLETE: CPT | Mod: 26

## 2019-03-28 RX ADMIN — Medication 250 MILLIGRAM(S): at 05:17

## 2019-03-28 RX ADMIN — HEPARIN SODIUM 5000 UNIT(S): 5000 INJECTION INTRAVENOUS; SUBCUTANEOUS at 17:36

## 2019-03-28 RX ADMIN — Medication 325 MILLIGRAM(S): at 14:07

## 2019-03-28 RX ADMIN — HEPARIN SODIUM 5000 UNIT(S): 5000 INJECTION INTRAVENOUS; SUBCUTANEOUS at 05:16

## 2019-03-28 RX ADMIN — AMLODIPINE BESYLATE 5 MILLIGRAM(S): 2.5 TABLET ORAL at 05:17

## 2019-03-28 RX ADMIN — Medication 250 MILLIGRAM(S): at 23:14

## 2019-03-28 RX ADMIN — Medication 250 MILLIGRAM(S): at 14:07

## 2019-03-28 RX ADMIN — Medication 88 MICROGRAM(S): at 05:17

## 2019-03-28 RX ADMIN — ATORVASTATIN CALCIUM 10 MILLIGRAM(S): 80 TABLET, FILM COATED ORAL at 23:12

## 2019-03-28 RX ADMIN — Medication 250 MILLIGRAM(S): at 17:35

## 2019-03-28 NOTE — PROGRESS NOTE ADULT - SUBJECTIVE AND OBJECTIVE BOX
Internal Medicine Hospitalist - Dr. Kali LEVI    [id number]    85y      Female    Patient is a 85y old  Female who presents with a chief complaint of generalized weakness (27 Mar 2019 12:46)    INTERVAL HPI/ OVERNIGHT EVENTS: Patient is seen and examined, denied chest pain, SOB, abd. pain, nausea and vomiting     REVIEW OF SYSTEMS:    Denied fever, chills, abd. pain, nausea, vomiting, chest pain, SOB, headache, dizziness    PHYSICAL EXAM:    Vital Signs Last 24 Hrs  T(C): 36.8 (28 Mar 2019 04:51), Max: 36.8 (27 Mar 2019 21:44)  T(F): 98.3 (28 Mar 2019 04:51), Max: 98.3 (28 Mar 2019 04:51)  HR: 58 (28 Mar 2019 05:20) (50 - 58)  BP: 135/59 (28 Mar 2019 04:51) (90/40 - 135/59)  BP(mean): --  RR: 16 (28 Mar 2019 05:20) (16 - 18)  SpO2: 99% (28 Mar 2019 05:20) (95% - 99%)    GENERAL: NAD  CHEST/LUNG: CTA b/l   HEART: S1S2+ audible  ABDOMEN: Soft, Nontender, Nondistended; Bowel sounds present  EXTREMITIES:  no edema  CNS: Awake    LABS:        133<L>  |  104  |  27.0<H>  ----------------------------<  103  4.2   |  21.0<L>  |  0.88    Ca    11.4<H>      27 Mar 2019 06:54        Urinalysis Basic - ( 26 Mar 2019 12:57 )    Color: Yellow / Appearance: Clear / S.020 / pH: x  Gluc: x / Ketone: Negative  / Bili: Negative / Urobili: Negative mg/dL   Blood: x / Protein: 15 mg/dL / Nitrite: Positive   Leuk Esterase: Trace / RBC: 0-2 /HPF / WBC 3-5   Sq Epi: x / Non Sq Epi: Occasional / Bacteria: Moderate          MEDICATIONS  (STANDING):  amLODIPine   Tablet 5 milliGRAM(s) Oral daily  aspirin enteric coated 325 milliGRAM(s) Oral daily  atorvastatin 10 milliGRAM(s) Oral at bedtime  heparin  Injectable 5000 Unit(s) SubCutaneous every 12 hours  levothyroxine 88 MICROGram(s) Oral daily  vancomycin    Solution 250 milliGRAM(s) Oral every 6 hours    MEDICATIONS  (PRN):  nitroglycerin     SubLingual 0.4 milliGRAM(s) SubLingual every 5 minutes PRN Chest Pain      RADIOLOGY & ADDITIONAL TEST    < from: TTE Echo Complete w/Doppler (19 @ 09:33) >  Summary:   1. Left ventricular ejection fraction, by visual estimation, is >75%.   2. Hyperdynamic global left ventricular systolic function.   3. Normal left ventricular internal cavity size.   4. Spectral Doppler shows restrictive pattern of left ventricular   myocardial filling (Grade III diastolic dysfunction).   5. Basal cavity obliteration visualized.   6. Normal right ventricular size and function.   7. Small pericardial effusion.   8. Moderate mitral annular calcification.   9. Peak transmitral mean gradient equals 4.0 mmHg, calculated mitral   valve area by pressure half time equals 1.24 cm² consistent with mild   mitral stenosis.  10. Thickening of the anterior and posterior mitral valve leaflets.  11. Trace tricuspid regurgitation.  12. Mild to moderate aortic regurgitation.  13. Trace pulmonic valve regurgitation.  14. The main pulmonary artery is normal in size.  15. Mobile echodenisty visualized measuring 1.5 x .7 x 1.0 cm. noted on   posterior mitral leaflet on atrial side.  16. Consider MARYCHUY for further evlauation of echodensity on mitral valve.    < end of copied text >

## 2019-03-28 NOTE — PROGRESS NOTE ADULT - ASSESSMENT
This is 84 yo woman with recurrent falls, s/p loop recorder in dec admitted for fall, weakness, recently admitted with C diff positive  on po vanco, during last admission  cardiology consulted loop recorder interrogated , no cardiac events , dehydration. Pt is readmitted on 03/25 for weakness. urine c/s sent during last admisison growing E coli, pt denied urinary complaints, afebrile, normal WBC, repeat u/a no pyouria, will hold on abx for now. TTE showed LVEF >75%, grade 3 diastolic dysfunction, Mobile echodenisty visualized measuring 1.5 x .7 x 1.0 cm. noted on  posterior mitral leaflet on atrial side.    A/P    >C diff- c/w Po vanco to complete 14 days     >urine c/s E coli - ?colonization- pt is asymptomatic, afebrile, normal WBC  repeat u/a - no pyouria - no need of abx  due to recent C diff will try to avoid unnecessary abx     >Mobile echodensity on Mitral valve - afebrile  repeat cbc, blood c.s   discussed with Dr. Otoole, will probably need MARYCHUY tomorrow - NPO after midnight    >Fall - debility - multifactorial -  due to age related physical debility and CDIFF  Pt eval requested   Sw eval for rehab    >Positive troponin - noted on last admission  appreciate cardiology input -troponin flat   repeat TTE today  c/w aspirin, statin   due to sinus bradycardia stopped Metoprolol     >HTN - start amlodipine   Monitor BP    >Chronic hypercalcemia -   - likely primary hyperparathyroidism   f/u endocrinology as an outpatient   f/u BMP    >DVT PPX- heparin

## 2019-03-28 NOTE — PROGRESS NOTE ADULT - SUBJECTIVE AND OBJECTIVE BOX
Formerly [facility], [facility]                              [address] [place] [address]                                                 PHONE: [phone]                                                 FAX: [phone]  -----------------------------------------------------------------------------------------------  Pt seen and examined. FU for endocarditis    Overnight events/Complaints: Pt without complains.    Vital Signs Last 24 Hrs  T(C): 36.8 (28 Mar 2019 04:51), Max: 36.8 (27 Mar 2019 21:44)  T(F): 98.3 (28 Mar 2019 04:51), Max: 98.3 (28 Mar 2019 04:51)  HR: 58 (28 Mar 2019 05:20) (50 - 58)  BP: 135/59 (28 Mar 2019 04:51) (90/40 - 135/59)  BP(mean): --  RR: 16 (28 Mar 2019 05:20) (16 - 18)  SpO2: 99% (28 Mar 2019 05:20) (95% - 99%)  I&O's Summary    27 Mar 2019 07:01  -  28 Mar 2019 07:00  --------------------------------------------------------  IN: 360 mL / OUT: 0 mL / NET: 360 mL    28 Mar 2019 07:01  -  28 Mar 2019 15:19  --------------------------------------------------------  IN: 120 mL / OUT: 0 mL / NET: 120 mL        PHYSICAL EXAM:  Constitutional: Appears well developed, well nourished; alert and co-operative  HEENT:     Head: Normocephalic and atraumatic  Neck: supple. No JVD  Cardiovascular: Normal S1 S2, No murmurs  Respiratory: Lungs clear to auscultation; no crepitations, no wheeze  Gastrointestinal:  Soft, Non-tender, + BS	  Musculoskeletal: Normal range of motion. No edema  Skin: Warm and dry. No cyanosis . No diaphoresis.  Neurologic: Alert oriented to time place and person. Normal strength and no tremor.        LABS:                        12.0   6.4   )-----------( 224      ( 28 Mar 2019 12:43 )             36.7     03-28    136  |  103  |  29.0<H>  ----------------------------<  91  4.7   |  24.0  |  0.89    Ca    11.9<H>      28 Mar 2019 12:43    RADIOLOGY & ADDITIONAL STUDIES: (reviewed)    CARDIOLOGY TESTING:(reviewed)     TELEMETRY (Independent visualization) : No arrhythmias    ECHOCARDIOGRAM:  < from: TTE Echo Complete w/Doppler (03.28.19 @ 09:33) >  Summary:   1. Left ventricular ejection fraction, by visual estimation, is >75%.   2. Hyperdynamic global left ventricular systolic function.   3. Normal left ventricular internal cavity size.   4. Spectral Doppler shows restrictive pattern of left ventricular   myocardial filling (Grade III diastolic dysfunction).   5. Basal cavity obliteration visualized.   6. Normal right ventricular size and function.   7. Small pericardial effusion.   8. Moderate mitral annular calcification.   9. Peak transmitral mean gradient equals 4.0 mmHg, calculated mitral   valve area by pressure half time equals 1.24 cm² consistent with mild   mitral stenosis.  10. Thickening of the anterior and posterior mitral valve leaflets.  11. Trace tricuspid regurgitation.  12. Mild to moderate aortic regurgitation.  13. Trace pulmonic valve regurgitation.  14. The main pulmonary artery is normal in size.  15. Mobile echodenisty visualized measuring 1.5 x .7 x 1.0 cm. noted on   posterior mitral leaflet on atrial side.  16. Consider MARYCHUY for further evlauation of echodensity on mitral valve.    [id number] Nathan Alvarez MD, Electronically signed on 3/28/2019 at 11:14:37   AM    < end of copied text >    MEDICATIONS:(reviewed)  MEDICATIONS  (STANDING):  amLODIPine   Tablet 5 milliGRAM(s) Oral daily  aspirin enteric coated 325 milliGRAM(s) Oral daily  atorvastatin 10 milliGRAM(s) Oral at bedtime  heparin  Injectable 5000 Unit(s) SubCutaneous every 12 hours  levothyroxine 88 MICROGram(s) Oral daily  vancomycin    Solution 250 milliGRAM(s) Oral every 6 hours    MEDICATIONS  (PRN):  nitroglycerin     SubLingual 0.4 milliGRAM(s) SubLingual every 5 minutes PRN Chest Pain      ASSESSMENT AND PLAN:    85y Female with prior history of dc'd from [facility] 3/25 after w/u for fall-found to have C-dif and placed on oral Vanco. Pt was unable to get out of car and brought back to the ER.   s/p ILR  Echo with mobile echodensity [1.5 X 0.7 X 1 cm]  Plan for MARYCHUY in AM  NPO past mid nite     d/w pt and son at bedside who are agreeable but will make a final decision after discussion with pt's daughters    d/w Dr. Bass        HTN:  CAD:  -  Continue ASA and statin  AF:  -  Rates controlled  - Continue po amio, cardizem, metoprolol  - Anticoagulation  Dyslipidemia:  CMP:  Edema:  Shortness of breath:  - Likely multifactorial    Plan discussed with medicine team

## 2019-03-29 PROCEDURE — 99232 SBSQ HOSP IP/OBS MODERATE 35: CPT

## 2019-03-29 RX ORDER — CEFTRIAXONE 500 MG/1
2 INJECTION, POWDER, FOR SOLUTION INTRAMUSCULAR; INTRAVENOUS ONCE
Qty: 0 | Refills: 0 | Status: COMPLETED | OUTPATIENT
Start: 2019-03-29 | End: 2019-03-29

## 2019-03-29 RX ORDER — CEFTRIAXONE 500 MG/1
INJECTION, POWDER, FOR SOLUTION INTRAMUSCULAR; INTRAVENOUS
Qty: 0 | Refills: 0 | Status: DISCONTINUED | OUTPATIENT
Start: 2019-03-29 | End: 2019-04-02

## 2019-03-29 RX ORDER — SODIUM CHLORIDE 9 MG/ML
1000 INJECTION INTRAMUSCULAR; INTRAVENOUS; SUBCUTANEOUS
Qty: 0 | Refills: 0 | Status: DISCONTINUED | OUTPATIENT
Start: 2019-03-29 | End: 2019-03-31

## 2019-03-29 RX ORDER — VANCOMYCIN HCL 1 G
1000 VIAL (EA) INTRAVENOUS EVERY 12 HOURS
Qty: 0 | Refills: 0 | Status: DISCONTINUED | OUTPATIENT
Start: 2019-03-29 | End: 2019-03-29

## 2019-03-29 RX ORDER — VANCOMYCIN HCL 1 G
750 VIAL (EA) INTRAVENOUS EVERY 12 HOURS
Qty: 0 | Refills: 0 | Status: DISCONTINUED | OUTPATIENT
Start: 2019-03-29 | End: 2019-04-02

## 2019-03-29 RX ORDER — CEFTRIAXONE 500 MG/1
2 INJECTION, POWDER, FOR SOLUTION INTRAMUSCULAR; INTRAVENOUS EVERY 24 HOURS
Qty: 0 | Refills: 0 | Status: DISCONTINUED | OUTPATIENT
Start: 2019-03-30 | End: 2019-04-02

## 2019-03-29 RX ORDER — SACCHAROMYCES BOULARDII 250 MG
250 POWDER IN PACKET (EA) ORAL
Qty: 0 | Refills: 0 | Status: DISCONTINUED | OUTPATIENT
Start: 2019-03-29 | End: 2019-04-02

## 2019-03-29 RX ADMIN — CEFTRIAXONE 100 GRAM(S): 500 INJECTION, POWDER, FOR SOLUTION INTRAMUSCULAR; INTRAVENOUS at 17:54

## 2019-03-29 RX ADMIN — Medication 250 MILLIGRAM(S): at 23:28

## 2019-03-29 RX ADMIN — Medication 250 MILLIGRAM(S): at 18:36

## 2019-03-29 RX ADMIN — Medication 325 MILLIGRAM(S): at 12:10

## 2019-03-29 RX ADMIN — Medication 88 MICROGRAM(S): at 06:47

## 2019-03-29 RX ADMIN — HEPARIN SODIUM 5000 UNIT(S): 5000 INJECTION INTRAVENOUS; SUBCUTANEOUS at 17:55

## 2019-03-29 RX ADMIN — Medication 250 MILLIGRAM(S): at 06:47

## 2019-03-29 RX ADMIN — SODIUM CHLORIDE 75 MILLILITER(S): 9 INJECTION INTRAMUSCULAR; INTRAVENOUS; SUBCUTANEOUS at 12:10

## 2019-03-29 RX ADMIN — AMLODIPINE BESYLATE 5 MILLIGRAM(S): 2.5 TABLET ORAL at 06:48

## 2019-03-29 RX ADMIN — ATORVASTATIN CALCIUM 10 MILLIGRAM(S): 80 TABLET, FILM COATED ORAL at 23:28

## 2019-03-29 RX ADMIN — HEPARIN SODIUM 5000 UNIT(S): 5000 INJECTION INTRAVENOUS; SUBCUTANEOUS at 06:48

## 2019-03-29 RX ADMIN — Medication 250 MILLIGRAM(S): at 12:10

## 2019-03-29 NOTE — PROGRESS NOTE ADULT - SUBJECTIVE AND OBJECTIVE BOX
Internal Medicine Hospitalist - Dr. Kali LEVI    [id number]    85y      Female    Patient is a 85y old  Female who presents with a chief complaint of generalized weakness (28 Mar 2019 15:19)    INTERVAL HPI/ OVERNIGHT EVENTS: Patient is seen and examined, no new event overnight.     REVIEW OF SYSTEMS:    Denied fever, chills, abd. pain, nausea, vomiting, chest pain, SOB, headache, dizziness    PHYSICAL EXAM:    Vital Signs Last 24 Hrs  T(C): 36.4 (29 Mar 2019 12:36), Max: 37.2 (28 Mar 2019 20:53)  T(F): 97.6 (29 Mar 2019 12:36), Max: 99 (28 Mar 2019 20:53)  HR: 53 (29 Mar 2019 12:36) (53 - 60)  BP: 122/66 (29 Mar 2019 12:36) (113/61 - 136/56)  BP(mean): --  RR: 18 (29 Mar 2019 12:36) (18 - 18)  SpO2: 98% (29 Mar 2019 12:36) (94% - 98%)    GENERAL: NAD  CHEST/LUNG: CTA b/l   HEART: S1S2+ audible  ABDOMEN: Soft, Nontender, Nondistended; Bowel sounds present  EXTREMITIES:  no edema  CNS: AAO X 3  Psychiatry: normal mood    LABS:                        12.0   6.4   )-----------( 224      ( 28 Mar 2019 12:43 )             36.7     03-28    136  |  103  |  29.0<H>  ----------------------------<  91  4.7   |  24.0  |  0.89    Ca    11.9<H>      28 Mar 2019 12:43              MEDICATIONS  (STANDING):  amLODIPine   Tablet 5 milliGRAM(s) Oral daily  aspirin enteric coated 325 milliGRAM(s) Oral daily  atorvastatin 10 milliGRAM(s) Oral at bedtime  heparin  Injectable 5000 Unit(s) SubCutaneous every 12 hours  levothyroxine 88 MICROGram(s) Oral daily  sodium chloride 0.9%. 1000 milliLiter(s) (75 mL/Hr) IV Continuous <Continuous>  vancomycin    Solution 250 milliGRAM(s) Oral every 6 hours    MEDICATIONS  (PRN):  nitroglycerin     SubLingual 0.4 milliGRAM(s) SubLingual every 5 minutes PRN Chest Pain      RADIOLOGY & ADDITIONAL TEST

## 2019-03-29 NOTE — PROGRESS NOTE ADULT - SUBJECTIVE AND OBJECTIVE BOX
Patient is a 85y old  Female who presents with a chief complaint of generalized weakness (29 Mar 2019 13:24)      HPI:  86 y/o female who was discharged today from [facility] around 2 pm after being admitted for syncopy and had c- diff was brought back by family as she was very weak when she came out of the car to her room. Family did not feel safe to leave her alone in the house. Pt. lives on first floor by herself and daughter lives upstairs. no new complaints otherwise. no cp reported. no abd. pain. no n/v. no diarrhea at this point. no cough, no sob, no fever. pt. was followed by [facility] [facility]ology during recent admission. (25 Mar 2019 23:16)      PAST MEDICAL & SURGICAL HISTORY:  Near syncope  Hypertension  Hypercholesterolemia  Hypothyroid  No significant past surgical history      Allergies    penicillins (Rash)      MEDICATIONS  (STANDING):  amLODIPine   Tablet 5 milliGRAM(s) Oral daily  aspirin enteric coated 325 milliGRAM(s) Oral daily  atorvastatin 10 milliGRAM(s) Oral at bedtime  heparin  Injectable 5000 Unit(s) SubCutaneous every 12 hours  levothyroxine 88 MICROGram(s) Oral daily  sodium chloride 0.9%. 1000 milliLiter(s) (75 mL/Hr) IV Continuous <Continuous>  vancomycin    Solution 250 milliGRAM(s) Oral every 6 hours    MEDICATIONS  (PRN):  nitroglycerin     SubLingual 0.4 milliGRAM(s) SubLingual every 5 minutes PRN Chest Pain    	    Vital Signs Last 24 Hrs  T(C): 36.4 (29 Mar 2019 12:36), Max: 37.2 (28 Mar 2019 20:53)  T(F): 97.6 (29 Mar 2019 12:36), Max: 99 (28 Mar 2019 20:53)  HR: 38 (29 Mar 2019 13:42) (38 - 60)  BP: 122/66 (29 Mar 2019 12:36) (113/61 - 136/56)    RR: 18 (29 Mar 2019 12:36) (18 - 18)  SpO2: 98% (29 Mar 2019 12:36) (94% - 98%)    I&O's Detail    28 Mar 2019 07:01  -  29 Mar 2019 07:00  --------------------------------------------------------  IN:    Oral Fluid: 520 mL  Total IN: 520 mL    OUT:  Total OUT: 0 mL    Total NET: 520 mL      29 Mar 2019 07:01  -  29 Mar 2019 16:14  --------------------------------------------------------  IN:    sodium chloride 0.9%.: 300 mL  Total IN: 300 mL    OUT:  Total OUT: 0 mL    Total NET: 300 mL      PHYSICAL EXAM:  Appearance: Normal, well nourished	  HEENT:   Normal oral mucosa,   Cardiovascular: Normal S1 S2, No JVD, No cardiac murmurs, No peripheral edema  Respiratory: Lungs clear to auscultation	  Psychiatry: A & O x 3, Mood & affect appropriate  Neurologic: Grossly non-focal with full strength in all four extremities        LABS:                        12.0   6.4   )-----------( 224      ( 28 Mar 2019 12:43 )             36.7     03-28    136  |  103  |  29.0<H>  ----------------------------<  91  4.7   |  24.0  |  0.89    Ca    11.9<H>      28 Mar 2019 12:43              I&O's Summary    28 Mar 2019 07:01  -  29 Mar 2019 07:00  --------------------------------------------------------  IN: 520 mL / OUT: 0 mL / NET: 520 mL    29 Mar 2019 07:01  -  29 Mar 2019 16:14  --------------------------------------------------------  IN: 300 mL / OUT: 0 mL / NET: 300 mL      Assessment and Plan    Frail 84 yo female with positive troponins and abnormal echocardiogram for MARYCHUY for further assessment

## 2019-03-29 NOTE — PROGRESS NOTE ADULT - ASSESSMENT
This is 84 yo woman with recurrent falls, s/p loop recorder in dec admitted for fall, weakness, recently admitted with C diff positive  on po vanco, during last admission  cardiology consulted loop recorder interrogated , no cardiac events , dehydration. Pt is readmitted on 03/25 for weakness. urine c/s sent during last admisison growing E coli, pt denied urinary complaints, afebrile, normal WBC, repeat u/a no pyouria, will hold on abx for now. TTE showed LVEF >75%, grade 3 diastolic dysfunction, Mobile echodenisty visualized measuring 1.5 x .7 x 1.0 cm. noted on  posterior mitral leaflet on atrial side, schedule for MARYCHUY today    A/P    >C diff- c/w Po vanco to complete 14 days     >urine c/s E coli - ?colonization- pt is asymptomatic, afebrile, normal WBC  repeat u/a - no pyouria - no need of abx  due to recent C diff will try to avoid unnecessary abx     >Mobile echodensity on Mitral valve - afebrile, no leucocytosis   appreciate cardiology - schedule for MARYCHUY today  f/u blood c/s    >Fall - debility - multifactorial -  due to age related physical debility and CDIFF  Pt eval requested   Sw eval for rehab    >Positive troponin - noted on last admission  appreciate cardiology input -troponin flat   repeat TTE today  c/w aspirin, statin   due to sinus bradycardia stopped Metoprolol     >HTN - start amlodipine   Monitor BP    >Chronic hypercalcemia -   - likely primary hyperparathyroidism   f/u endocrinology as an outpatient   f/u BMP    >DVT PPX- heparin

## 2019-03-29 NOTE — PROGRESS NOTE ADULT - SUBJECTIVE AND OBJECTIVE BOX
Cardiology Nurse Practitioner Note    S/P MARYCHUY.  Clarice well.  Full report to follow.   Per verbal report pt with MV regurgitation with mobile echodenisty likely consistent with vegetation  Please f/u with complete report    VSS  Neuro: A&O X3  Lungs: CTA  CV: NSR

## 2019-03-30 LAB
ANION GAP SERPL CALC-SCNC: 10 MMOL/L — SIGNIFICANT CHANGE UP (ref 5–17)
BUN SERPL-MCNC: 25 MG/DL — HIGH (ref 8–20)
CALCIUM SERPL-MCNC: 12.6 MG/DL — HIGH (ref 8.6–10.2)
CHLORIDE SERPL-SCNC: 105 MMOL/L — SIGNIFICANT CHANGE UP (ref 98–107)
CO2 SERPL-SCNC: 22 MMOL/L — SIGNIFICANT CHANGE UP (ref 22–29)
CREAT SERPL-MCNC: 0.8 MG/DL — SIGNIFICANT CHANGE UP (ref 0.5–1.3)
CRP SERPL-MCNC: 1.27 MG/DL — HIGH (ref 0–0.4)
ERYTHROCYTE [SEDIMENTATION RATE] IN BLOOD: 47 MM/HR — HIGH (ref 0–20)
GLUCOSE SERPL-MCNC: 83 MG/DL — SIGNIFICANT CHANGE UP (ref 70–115)
HCT VFR BLD CALC: 39.8 % — SIGNIFICANT CHANGE UP (ref 37–47)
HGB BLD-MCNC: 13 G/DL — SIGNIFICANT CHANGE UP (ref 12–16)
MCHC RBC-ENTMCNC: 31.2 PG — HIGH (ref 27–31)
MCHC RBC-ENTMCNC: 32.7 G/DL — SIGNIFICANT CHANGE UP (ref 32–36)
MCV RBC AUTO: 95.4 FL — SIGNIFICANT CHANGE UP (ref 81–99)
PLATELET # BLD AUTO: 257 K/UL — SIGNIFICANT CHANGE UP (ref 150–400)
POTASSIUM SERPL-MCNC: 4.8 MMOL/L — SIGNIFICANT CHANGE UP (ref 3.5–5.3)
POTASSIUM SERPL-SCNC: 4.8 MMOL/L — SIGNIFICANT CHANGE UP (ref 3.5–5.3)
RBC # BLD: 4.17 M/UL — LOW (ref 4.4–5.2)
RBC # FLD: 13.5 % — SIGNIFICANT CHANGE UP (ref 11–15.6)
SODIUM SERPL-SCNC: 137 MMOL/L — SIGNIFICANT CHANGE UP (ref 135–145)
WBC # BLD: 6.6 K/UL — SIGNIFICANT CHANGE UP (ref 4.8–10.8)
WBC # FLD AUTO: 6.6 K/UL — SIGNIFICANT CHANGE UP (ref 4.8–10.8)

## 2019-03-30 PROCEDURE — 99232 SBSQ HOSP IP/OBS MODERATE 35: CPT

## 2019-03-30 RX ADMIN — Medication 325 MILLIGRAM(S): at 12:48

## 2019-03-30 RX ADMIN — Medication 250 MILLIGRAM(S): at 12:48

## 2019-03-30 RX ADMIN — Medication 250 MILLIGRAM(S): at 17:09

## 2019-03-30 RX ADMIN — Medication 250 MILLIGRAM(S): at 23:08

## 2019-03-30 RX ADMIN — SODIUM CHLORIDE 75 MILLILITER(S): 9 INJECTION INTRAMUSCULAR; INTRAVENOUS; SUBCUTANEOUS at 12:48

## 2019-03-30 RX ADMIN — Medication 250 MILLIGRAM(S): at 01:48

## 2019-03-30 RX ADMIN — HEPARIN SODIUM 5000 UNIT(S): 5000 INJECTION INTRAVENOUS; SUBCUTANEOUS at 07:02

## 2019-03-30 RX ADMIN — Medication 250 MILLIGRAM(S): at 07:02

## 2019-03-30 RX ADMIN — SODIUM CHLORIDE 75 MILLILITER(S): 9 INJECTION INTRAMUSCULAR; INTRAVENOUS; SUBCUTANEOUS at 01:48

## 2019-03-30 RX ADMIN — CEFTRIAXONE 100 GRAM(S): 500 INJECTION, POWDER, FOR SOLUTION INTRAMUSCULAR; INTRAVENOUS at 17:09

## 2019-03-30 RX ADMIN — AMLODIPINE BESYLATE 5 MILLIGRAM(S): 2.5 TABLET ORAL at 07:02

## 2019-03-30 RX ADMIN — HEPARIN SODIUM 5000 UNIT(S): 5000 INJECTION INTRAVENOUS; SUBCUTANEOUS at 17:10

## 2019-03-30 RX ADMIN — ATORVASTATIN CALCIUM 10 MILLIGRAM(S): 80 TABLET, FILM COATED ORAL at 23:08

## 2019-03-30 RX ADMIN — Medication 250 MILLIGRAM(S): at 19:02

## 2019-03-30 RX ADMIN — Medication 88 MICROGRAM(S): at 07:02

## 2019-03-30 RX ADMIN — Medication 250 MILLIGRAM(S): at 11:02

## 2019-03-30 NOTE — DIETITIAN INITIAL EVALUATION ADULT. - PERTINENT LABORATORY DATA
03-28 Na136 mmol/L Glu 91 mg/dL K+ 4.7 mmol/L Cr  0.89 mg/dL BUN 29.0 mg/dL<H> Phos n/a   Alb n/a   PAB n/a

## 2019-03-30 NOTE — PROGRESS NOTE ADULT - SUBJECTIVE AND OBJECTIVE BOX
[place] [facility] - [facility], [facility]                                   [address] [place] [address]                                                      PHONE: [phone]                                                         FAX: [phone]  [email or web address]  ---------------------------------------------------------------------------------------------------------------------------------    Overnight events/patient complaints: MARYCHUY + for endocarditis      penicillins (Rash)    MEDICATIONS  (STANDING):  amLODIPine   Tablet 5 milliGRAM(s) Oral daily  aspirin enteric coated 325 milliGRAM(s) Oral daily  atorvastatin 10 milliGRAM(s) Oral at bedtime  cefTRIAXone   IVPB      cefTRIAXone   IVPB 2 Gram(s) IV Intermittent every 24 hours  heparin  Injectable 5000 Unit(s) SubCutaneous every 12 hours  levothyroxine 88 MICROGram(s) Oral daily  saccharomyces boulardii 250 milliGRAM(s) Oral two times a day  sodium chloride 0.9%. 1000 milliLiter(s) (75 mL/Hr) IV Continuous <Continuous>  vancomycin    Solution 250 milliGRAM(s) Oral every 6 hours  vancomycin  IVPB 750 milliGRAM(s) IV Intermittent every 12 hours    MEDICATIONS  (PRN):  nitroglycerin     SubLingual 0.4 milliGRAM(s) SubLingual every 5 minutes PRN Chest Pain      Vital Signs Last 24 Hrs  T(C): 36.6 (30 Mar 2019 05:02), Max: 36.9 (29 Mar 2019 23:29)  T(F): 97.8 (30 Mar 2019 05:02), Max: 98.4 (29 Mar 2019 23:29)  HR: 47 (30 Mar 2019 05:02) (38 - 59)  BP: 148/63 (30 Mar 2019 05:02) (119/62 - 148/63)  BP(mean): --  RR: 16 (30 Mar 2019 05:02) (16 - 18)  SpO2: 93% (30 Mar 2019 05:02) (93% - 98%)  ICU Vital Signs Last 24 Hrs  YADIRA LEVI  I&O's Detail    29 Mar 2019 07:01  -  30 Mar 2019 07:00  --------------------------------------------------------  IN:    sodium chloride 0.9%.: 600 mL  Total IN: 600 mL    OUT:  Total OUT: 0 mL    Total NET: 600 mL        I&O's Summary    29 Mar 2019 07:01  -  30 Mar 2019 07:00  --------------------------------------------------------  IN: 600 mL / OUT: 0 mL / NET: 600 mL      Drug Dosing Weight  YADIRA AMITA      PHYSICAL EXAM:  General: NAD.  HEENT: Head; normocephalic.  Eyes: Pupils reactive.  Neck: Supple.  CARDIOVASCULAR: Normal S1 and S2.   LUNGS: Normal breath sounds bilaterally.  ABDOMEN: Soft, nontender.  EXTREMITIES: Normal.   SKIN: warm and dry.  NEURO: Alert/oriented.    PSYCH: normal affect.        LABS:                        13.0   6.6   )-----------( 257      ( 30 Mar 2019 08:13 )             39.8     03-30    137  |  105  |  25.0<H>  ----------------------------<  83  4.8   |  22.0  |  0.80    Ca    12.6<H>      30 Mar 2019 08:13      YADIRA LEVI        RADIOLOGY & ADDITIONAL STUDIES:    INTERPRETATION OF TELEMETRY (personally reviewed): SB - NSR 50-60s    ECG: NSR 68, RBBB    ECHO:  1. Left ventricular ejection fraction, by visual estimation, is >75%.   2. Hyperdynamic global left ventricular systolic function.   3. Normal left ventricular internal cavity size.   4. Spectral Doppler shows restrictive pattern of left ventricular   myocardial filling (Grade III diastolic dysfunction).   5. Basal cavity obliteration visualized.   6. Normal right ventricular size and function.   7. Small pericardial effusion.   8. Moderate mitral annular calcification.   9. Peak transmitral mean gradient equals 4.0 mmHg, calculated mitral   valve area by pressure half time equals 1.24 cm² consistent with mild   mitral stenosis.  10. Thickening of the anterior and posterior mitral valve leaflets.  11. Trace tricuspid regurgitation.  12. Mild to moderate aortic regurgitation.  13. Trace pulmonic valve regurgitation.  14. The main pulmonary artery is normal in size.  15. Mobile echodenisty visualized measuring 1.5 x .7 x 1.0 cm. noted on   posterior mitral leaflet on atrial side.  16. Consider MARYCHUY for further evlauation of echodensity on mitral valve.    [id number] Nathan Alvarez MD, Electronically signed on 3/28/2019 at 11:14:37   AM      MARYCHUY:  1. Mobile mass/vegetation    1.38 x 0.7 cm seen on posterior mitral valve leaflet P2 with mild to   moderate MR likely consistent with endocarditis.   2. Left ventricular ejection fraction, by visual estimation, is 55 to   60%.   3. Mild mitral annular calcification.   4. Mild to moderate mitral valve regurgitation.   5. Mild tricuspid regurgitation.   6. Intact intra-atrial septum without shunt.   7. There is mild aortic root calcification.    [id number] Mati Wright MD, Electronically signed on 3/29/2019 at 5:02:39 PM       ASSESSMENT AND PLAN:  85y Female dc'd from [facility] 3/25 after w/u for fall found to have C-dif and placed on oral Vanco. Pt was unable to get out of car and brought back to the ER.  s/p ILR in 12/18. Echo revealed mobile echodensity [1.5 X 0.7 X 1 cm]. MARYCHUY on Friday revealed evidence of endocarditis.    1. ID service consulted.  2. Continue with IV abx.  3. Follow up repeat blood cx's.

## 2019-03-30 NOTE — PROGRESS NOTE ADULT - SUBJECTIVE AND OBJECTIVE BOX
YADIRA LEVI    [id number]    85y      Female    Patient is a 85y old  Female who presents with a chief complaint of generalized weakness (30 Mar 2019 09:23)      INTERVAL HPI/OVERNIGHT EVENTS:    Patient is doing ok, she denies any diarrhea, nausea or vomiting, has no dizziness, fever, chills, chest pain, abdominal pain.     REVIEW OF SYSTEMS:    CONSTITUTIONAL: No fever, some fatigue  RESPIRATORY: No cough, No shortness of breath  CARDIOVASCULAR: No chest pain, palpitations  GASTROINTESTINAL: No abdominal, No nausea, vomiting  NEUROLOGICAL: No headaches,  loss of strength.  MISCELLANEOUS: No joint swelling or pain       Vital Signs Last 24 Hrs  T(C): 36.9 (30 Mar 2019 10:36), Max: 36.9 (29 Mar 2019 23:29)  T(F): 98.4 (30 Mar 2019 10:36), Max: 98.4 (29 Mar 2019 23:29)  HR: 54 (30 Mar 2019 10:36) (38 - 59)  BP: 112/63 (30 Mar 2019 10:36) (112/63 - 148/63)  RR: 20 (30 Mar 2019 10:36) (16 - 20)  SpO2: 93% (30 Mar 2019 05:02) (93% - 98%)    PHYSICAL EXAM:    GENERAL: Elderly female looking comfortable  HEENT: PERRL, +EOMI  NECK: soft, Supple, No JVD,   CHEST/LUNG: Clear to auscultate bilaterally; No wheezing  HEART: S1S2+, Regular rate and rhythm; systolic murmurs  ABDOMEN: Soft, Nontender, Nondistended; Bowel sounds present  EXTREMITIES:  2+ Peripheral Pulses, No edema  SKIN: No rashes or lesions  NEURO: AAOX3, no focal deficits, no motor r sensory loss  PSYCH: normal mood      LABS:                        13.0   6.6   )-----------( 257      ( 30 Mar 2019 08:13 )             39.8     03-30    137  |  105  |  25.0<H>  ----------------------------<  83  4.8   |  22.0  |  0.80    Ca    12.6<H>      30 Mar 2019 08:13              I&O's Summary    29 Mar 2019 07:01  -  30 Mar 2019 07:00  --------------------------------------------------------  IN: 600 mL / OUT: 0 mL / NET: 600 mL        MEDICATIONS  (STANDING):  amLODIPine   Tablet 5 milliGRAM(s) Oral daily  aspirin enteric coated 325 milliGRAM(s) Oral daily  atorvastatin 10 milliGRAM(s) Oral at bedtime  cefTRIAXone   IVPB      cefTRIAXone   IVPB 2 Gram(s) IV Intermittent every 24 hours  heparin  Injectable 5000 Unit(s) SubCutaneous every 12 hours  levothyroxine 88 MICROGram(s) Oral daily  saccharomyces boulardii 250 milliGRAM(s) Oral two times a day  sodium chloride 0.9%. 1000 milliLiter(s) (75 mL/Hr) IV Continuous <Continuous>  vancomycin    Solution 250 milliGRAM(s) Oral every 6 hours  vancomycin  IVPB 750 milliGRAM(s) IV Intermittent every 12 hours    MEDICATIONS  (PRN):  nitroglycerin     SubLingual 0.4 milliGRAM(s) SubLingual every 5 minutes PRN Chest Pain

## 2019-03-30 NOTE — CONSULT NOTE ADULT - ASSESSMENT
This 85 y.o. F   here for falls  recent C diff on  Vancomycin  - clinically w/o diarrhea.     Here for workup of fall  found with   Mobile mass/vegetation  1.38 x 0.7 cm seen on posterior mitral valve leaflet P2 with mild to moderate MR likely consistent with endocarditis.    - blood cultures sent  - check ESR and CRP  - continue Vancomycin and Ceftriaxone      - follow up all outstanding cultures  - trend temperature and WBC curve  - repeat cultures from blood and all sources if febrile.
1. 86 yo F just out of the hosp-c-dif/fall represents with weakness. Called because of elevated troponin-0.18-her troponin was mildly elevated the last admission-Pt denies cp/dyspnea and her ekg shows no acute ischemic signs. A very recent echo showed nl EF. She displays no evidence of chf/her renal function is good and there'[s no evidence of a PE. Given her DNR/I status-a further cardiac w/u is not indicated.  2. hypertension  3. hypothyroidism.  4. hypercalcemia-etiology unclear but pt is folowed by endocrinologist and a w/u may heve been done.

## 2019-03-30 NOTE — DIETITIAN INITIAL EVALUATION ADULT. - SIGNS/SYMPTOMS
as evidenced by suboptimal po intake, not meeting nutritional needs as evidenced by suboptimal po intake, not meeting nutritional needs, high bun (dehydration)

## 2019-03-30 NOTE — CONSULT NOTE ADULT - SUBJECTIVE AND OBJECTIVE BOX
[facility]  INFECTIOUS DISEASES AND INTERNAL MEDICINE at [place]  =======================================================  Joe Castillo MD  Diplomates American Board of Internal Medicine and Infectious Diseases  =======================================================    [id number]  YADIRA LEVI   This is 84 yo woman with recurrent falls, s/p loop recorder in 2018, admitted for fall, weakness, recently admitted with C diff positive 3/21/19, started on po vanco, during last admission  cardiology consulted loop recorder interrogated , no cardiac events , dehydration. Pt is readmitted on  for weakness. urine c/s sent during last admission growing E coli, pt denied urinary complaints, afebrile, normal WBC, repeat u/a no pyouria, and given 1 dose of Levaquin on 3/26/19 then stopped.   TTE showed LVEF >75%, grade 3 diastolic dysfunction, Mobile echodensity visualized measuring 1.5 x .7 x 1.0 cm. noted on  posterior mitral leaflet on atrial side.  She had MARYCHUY on 3/29/19 which confirmed vegetation.    Cultures have been sent, jimy was advised to be started on Ceftriaxone and Vancomycin IV.   patient and daughter Keren in room. No recent dental work, has dentures.  No pacemaker, no cardiac surgery. no drug use.   Had some weight loss attributed to poor appetite after C diff infection.  No fevers noted.     =======================================================  Past Medical & Surgical Hx:  =====================  PAST MEDICAL & SURGICAL HISTORY:  Near syncope  Hypertension  Hypercholesterolemia  Hypothyroid  No significant past surgical history    Problem List:  ==========  HEALTH ISSUES - PROBLEM Dx:    Social Hx:  =======  no toxic habits currently    FAMILY HISTORY:  FH: stroke: her mother  at age 70&#x27;s  FH: heart disease: father  when she was  9 , does not remember his exact age when   no significant family history of immunosuppressive disorders in mother or father   =======================================================  REVIEW OF SYSTEMS:  as above  all other ROS negative  =======================================================  Allergies  penicillins (Rash)    Antibiotics:  cefTRIAXone   IVPB      cefTRIAXone   IVPB 2 Gram(s) IV Intermittent every 24 hours  vancomycin    Solution 250 milliGRAM(s) Oral every 6 hours  vancomycin  IVPB 750 milliGRAM(s) IV Intermittent every 12 hours    Other medications:  amLODIPine   Tablet 5 milliGRAM(s) Oral daily  aspirin enteric coated 325 milliGRAM(s) Oral daily  atorvastatin 10 milliGRAM(s) Oral at bedtime  heparin  Injectable 5000 Unit(s) SubCutaneous every 12 hours  levothyroxine 88 MICROGram(s) Oral daily  saccharomyces boulardii 250 milliGRAM(s) Oral two times a day  sodium chloride 0.9%. 1000 milliLiter(s) IV Continuous <Continuous>       cefTRIAXone   IVPB   100 mL/Hr IV Intermittent (19 @ 17:54)    levoFLOXacin IVPB   100 mL/Hr IV Intermittent (19 @ 02:22)    vancomycin    Solution   250 milliGRAM(s) Oral (19 @ 18:27)   250 milliGRAM(s) Oral (19 @ 00:32)   250 milliGRAM(s) Oral (19 @ 05:17)   250 milliGRAM(s) Oral (19 @ 13:41)   250 milliGRAM(s) Oral (19 @ 17:45)   250 milliGRAM(s) Oral (19 @ 23:04)   250 milliGRAM(s) Oral (19 @ 05:58)   250 milliGRAM(s) Oral (19 @ 13:42)   250 milliGRAM(s) Oral (19 @ 19:07)   250 milliGRAM(s) Oral (19 @ 23:06)   250 milliGRAM(s) Oral (19 @ 06:56)   250 milliGRAM(s) Oral (19 @ 12:26)   250 milliGRAM(s) Oral (19 @ 18:15)   250 milliGRAM(s) Oral (19 @ 23:07)   250 milliGRAM(s) Oral (19 @ 05:22)   250 milliGRAM(s) Oral (19 @ 22:34)   250 milliGRAM(s) Oral (19 @ 06:03)   250 milliGRAM(s) Oral (19 @ 11:43)   250 milliGRAM(s) Oral (19 @ 17:06)   250 milliGRAM(s) Oral (19 @ 00:28)   250 milliGRAM(s) Oral (19 @ 05:26)   250 milliGRAM(s) Oral (19 @ 11:01)   250 milliGRAM(s) Oral (19 @ 17:09)   250 milliGRAM(s) Oral (19 @ 23:23)   250 milliGRAM(s) Oral (19 @ 05:17)   250 milliGRAM(s) Oral (19 @ 14:07)   250 milliGRAM(s) Oral (19 @ 17:35)   250 milliGRAM(s) Oral (19 @ 23:14)   250 milliGRAM(s) Oral (19 @ 06:47)   250 milliGRAM(s) Oral (19 @ 12:10)   250 milliGRAM(s) Oral (19 @ 23:28)   250 milliGRAM(s) Oral (19 @ 01:48)   250 milliGRAM(s) Oral (19 @ 07:02)    vancomycin  IVPB   250 mL/Hr IV Intermittent (19 @ 18:36)   250 mL/Hr IV Intermittent (19 @ 11:02)      ======================================================  Physical Exam:  ============  T(F): 98.4 (30 Mar 2019 10:36), Max: 99 (28 Mar 2019 20:53)  HR: 54 (30 Mar 2019 10:36)  BP: 112/63 (30 Mar 2019 10:36)  RR: 20 (30 Mar 2019 10:36)  SpO2: 93% (30 Mar 2019 05:02) (93% - 98%)    General:  No acute distress.  THIN FRAIL  Eye: Pupils are equal, round and reactive to light, Extraocular movements are intact, Normal conjunctiva. NO CONJUNCTIVAL HEMORRHAGES  HENT: Normocephalic, Oral mucosa is moist, UPPER DENTURES  Neck: Supple, No lymphadenopathy.  Respiratory: Lungs are clear to auscultation ANTERIORLY  Cardiovascular: Normal rate, Regular rhythm, + MURMUR  IMPLANTED CARDIAC DEVICE IN LEFT CHEST LATERAL TO THE STERNUM  Gastrointestinal: Soft, Non-tender, Non-distended, Normal bowel sounds.  Genitourinary: No costovertebral angle tenderness.  Lymphatics: No lymphadenopathy neck,   Musculoskeletal: Normal range of motion, Normal strength.  Integumentary: No rash.  Neurologic: Alert, Oriented, No focal deficits, Cranial Nerves II-XII are grossly intact.  Psychiatric: Appropriate mood & affect.    =======================================================  Labs:                        13.0   6.6   )-----------( 257      ( 30 Mar 2019 08:13 )             39.8         137  |  105  |  25.0<H>  ----------------------------<  83  4.8   |  22.0  |  0.80    Ca    12.6<H>      30 Mar 2019 08:13        Culture - Urine (collected 19 @ 12:58)  Source: .Urine  Final Report (19 @ 09:26):    >100,000 CFU/ml Escherichia coli  Organism: Escherichia coli (19 @ 09:26)  Organism: Escherichia coli (19 @ 09:26)    Sensitivities:      -  Amikacin: S <=16      -  Ampicillin: R >16 These ampicillin results predict results for amoxicillin      -  Ampicillin/Sulbactam: R >16/8      -  Aztreonam: S <=4      -  Cefazolin: S <=8 For uncomplicated UTI with K. pneumoniae, E. coli, or P. mirablis: TEX <=16 is sensitive and TEX >=32 is resistant. This also predicts results for oral agents cefaclor, cefdinir, cefpodoxime, cefprozil, cefuroxime axetil, cephalexin and locarbef for uncomplicated UTI. Note that some isolates may be susceptible to these agents while testing resistant to cefazolin.      -  Cefepime: S <=4      -  Cefoxitin: S <=8      -  Ceftriaxone: S <=1 Enterobacter, Citrobacter, and Serratia may develop resistance during prolonged therapy      -  Ciprofloxacin: R >2      -  Ertapenem: S <=1      -  Gentamicin: S <=4      -  Imipenem: S <=1      -  Levofloxacin: R >4      -  Meropenem: S <=1      -  Nitrofurantoin: S <=32 Should not be used to treat pyelonephritis      -  Piperacillin/Tazobactam: S <=16      -  Tigecycline: S <=2      -  Tobramycin: S <=4      -  Trimethoprim/Sulfamethoxazole: S <=2/38      Method Type: TEX    Culture - Urine (collected 19 @ 07:50)  Source: .Urine  Final Report (19 @ 08:55):    >100,000 CFU/ml Escherichia coli  Organism: Escherichia coli (19 @ 08:55)  Organism: Escherichia coli (19 @ 08:55)    Sensitivities:      -  Amikacin: S <=16      -  Ampicillin: R >16 These ampicillin results predict results for amoxicillin      -  Ampicillin/Sulbactam: I 16/8      -  Aztreonam: S <=4      -  Cefazolin: S <=8 For uncomplicated UTI with K. pneumoniae, E. coli, or P. mirablis: TEX <=16 is sensitive and TEX >=32 is resistant. This also predicts results for oral agents cefaclor, cefdinir, cefpodoxime, cefprozil, cefuroxime axetil, cephalexin and locarbef for uncomplicated UTI. Note that some isolates may be susceptible to these agents while testing resistant to cefazolin.      -  Cefepime: S <=4      -  Cefoxitin: S <=8      -  Ceftriaxone: S <=1 Enterobacter, Citrobacter, and Serratia may develop resistance during prolonged therapy      -  Ciprofloxacin: R >2      -  Ertapenem: S <=1      -  Gentamicin: S <=4      -  Imipenem: S <=1      -  Levofloxacin: R >4      -  Meropenem: S <=1      -  Nitrofurantoin: S <=32 Should not be used to treat pyelonephritis      -  Piperacillin/Tazobactam: S <=16      -  Tigecycline: S <=2      -  Tobramycin: S <=4      -  Trimethoprim/Sulfamethoxazole: S <=2/38      Method Type: TEX    GI PCR Panel, Stool (collected 19 @ 19:21)  Source: .Stool  Final Report (19 @ 22:23):    GI PCR Results: NOT detected    *******Please Note:*******    GI panel PCR evaluates for:    Campylobacter, Plesiomonas shigelloides, Salmonella,    Vibrio, Yersinia enterocolitica, Enteroaggregative    Escherichia coli (EAEC), Enteropathogenic E.coli (EPEC),    Enterotoxigenic E. coli (ETEC) lt/st, Shiga-like    toxin-producing E. coli (STEC) stx1/stx2,    Shigella/ Enteroinvasive E. coli (EIEC), Cryptosporidium,    Cyclospora cayetanensis, Entamoeba histolytica,    Giardia lamblia, Adenovirus F 40/41, Astrovirus,    Norovirus GI/GII, Rotavirus A, Sapovirus      < from: MARYCHUY Echo Doppler (19 @ 16:22) >  Summary:   1. Mobile mass/vegetation    1.38 x 0.7 cm seen on posterior mitral valve leaflet P2 with mild to   moderate MR likely consistent with endocarditis.   2. Left ventricular ejection fraction, by visual estimation, is 55to   60%.   3. Mild mitral annular calcification.   4. Mild to moderate mitral valve regurgitation.   5. Mild tricuspid regurgitation.   6. Intact intra-atrial septum without shunt.   7. There is mild aortic root calcification.    [id number] Mati Wright MD, Electronically signed on 3/29/2019 at 5:02:39 PM    < end of copied text >

## 2019-03-30 NOTE — PROGRESS NOTE ADULT - ASSESSMENT
This is 84 yo woman with recurrent falls, s/p loop recorder in dec admitted for fall, weakness, recently admitted with C diff positive  on po vanco, during last admission  cardiology consulted loop recorder interrogated , no cardiac events , dehydration. Pt is readmitted on 03/25 for weakness. urine c/s sent during last admisison growing E coli, pt denied urinary complaints, afebrile, normal WBC, repeat u/a no pyouria, will hold on abx for now. TTE showed LVEF >75%, grade 3 diastolic dysfunction, Mobile echodenisty visualized measuring 1.5 x .7 x 1.0 cm. noted on  posterior mitral leaflet on atrial side, schedule for MARYCHUY today    A/P    >C diff- c/w Po vanco.     >urine c/s E coli - ?colonization- pt is asymptomatic, afebrile, normal WBC  repeat u/a - no pyouria - no need of abx  due to recent C diff will try to avoid unnecessary abx     >Mobile echodensity on Mitral valve - afebrile, no leucocytosis   appreciate cardiology - MARYCHUY done showed Mobile mass/vegetation 1.38 x 0.7 cm seen on posterior mitral valve leaflet P2 with mild to moderate MR likely consistent with endocarditis,  Left ventricular ejection fraction, by visual estimation, is 55to 60%.  patient has been started on Ceftriaxone and Vancomycin, blood cultures are pending, will get CP, ESR, will monitor closely.        >Fall - debility - multifactorial -  due to age related physical debility and CDIFF  Pt eval requested   Sw eval for rehab    >Positive troponin - noted on last admission  appreciate cardiology input -troponin flat   c/w aspirin, statin   due to sinus bradycardia stopped Metoprolol     >HTN - start amlodipine   Monitor BP    >Chronic hypercalcemia -   - likely primary hyperparathyroidism   f/u endocrinology as an outpatient   f/u BMP    >DVT PPX- heparin

## 2019-03-31 LAB — VANCOMYCIN TROUGH SERPL-MCNC: 16.3 UG/ML — SIGNIFICANT CHANGE UP (ref 10–20)

## 2019-03-31 PROCEDURE — 99232 SBSQ HOSP IP/OBS MODERATE 35: CPT

## 2019-03-31 RX ORDER — SODIUM CHLORIDE 9 MG/ML
1000 INJECTION, SOLUTION INTRAVENOUS
Qty: 0 | Refills: 0 | Status: DISCONTINUED | OUTPATIENT
Start: 2019-03-31 | End: 2019-04-02

## 2019-03-31 RX ADMIN — Medication 250 MILLIGRAM(S): at 17:44

## 2019-03-31 RX ADMIN — SODIUM CHLORIDE 60 MILLILITER(S): 9 INJECTION, SOLUTION INTRAVENOUS at 21:06

## 2019-03-31 RX ADMIN — ATORVASTATIN CALCIUM 10 MILLIGRAM(S): 80 TABLET, FILM COATED ORAL at 21:06

## 2019-03-31 RX ADMIN — Medication 250 MILLIGRAM(S): at 05:14

## 2019-03-31 RX ADMIN — Medication 88 MICROGRAM(S): at 05:14

## 2019-03-31 RX ADMIN — Medication 250 MILLIGRAM(S): at 09:19

## 2019-03-31 RX ADMIN — SODIUM CHLORIDE 60 MILLILITER(S): 9 INJECTION, SOLUTION INTRAVENOUS at 12:00

## 2019-03-31 RX ADMIN — Medication 250 MILLIGRAM(S): at 12:01

## 2019-03-31 RX ADMIN — HEPARIN SODIUM 5000 UNIT(S): 5000 INJECTION INTRAVENOUS; SUBCUTANEOUS at 05:13

## 2019-03-31 RX ADMIN — CEFTRIAXONE 100 GRAM(S): 500 INJECTION, POWDER, FOR SOLUTION INTRAMUSCULAR; INTRAVENOUS at 16:39

## 2019-03-31 RX ADMIN — Medication 325 MILLIGRAM(S): at 12:00

## 2019-03-31 RX ADMIN — HEPARIN SODIUM 5000 UNIT(S): 5000 INJECTION INTRAVENOUS; SUBCUTANEOUS at 17:45

## 2019-03-31 RX ADMIN — AMLODIPINE BESYLATE 5 MILLIGRAM(S): 2.5 TABLET ORAL at 05:14

## 2019-03-31 NOTE — PROGRESS NOTE ADULT - ASSESSMENT
This is 86 yo woman with recurrent falls, s/p loop recorder in dec admitted for fall, weakness, recently admitted with C diff positive  on po vanco, during last admission  cardiology consulted loop recorder interrogated , no cardiac events , dehydration. Pt is readmitted on 03/25 for weakness, urine c/s sent during last admission growing E coli, pt denied urinary complaints, afebrile, normal WBC, repeat u/a no pyouria, no need of abx due to recent C diff will try to avoid unnecessary abx, seen by ID, patient was noted to have Positive troponin, seen by cardiology and was c/w aspirin, statin, she was on metoprolol due to sinus bradycardia stopped Metoprolol, patient had TTE done showed LVEF >75%, grade 3 diastolic dysfunction, Mobile echodensity visualized measuring 1.5 x .7 x 1.0 cm. noted on  posterior mitral leaflet on atrial side, MARYCHUY done showed Mobile mass/vegetation 1.38 x 0.7 cm seen on posterior mitral valve leaflet P2 with mild to moderate MR likely consistent with endocarditis,  Left ventricular ejection fraction, by visual estimation, is 55to 60%, CRP and ESR elevated, patient has been started on Ceftriaxone and Vancomycin, blood cultures 1st set negative, 2nd set pending, ID is following, she is also being continued with PO Vancomycin for Hx of C dif.    She has Hx of Fall, likely multifactorial, due to age related physical debility and C DIFF, leading to dehydration, Pt eval requested, Sw eval for rehab.   She has Hx of Chronic hypercalcemia, her  , likely primary hyperparathyroidism, f/u endocrinology as an outpatient, she was noted to have calcium trending up, given IV fluids.        A/P    >C diff- c/w Po vanco.     >urine c/s E coli - ?colonization- pt is asymptomatic, afebrile, normal WBC  repeat u/a - no pyouria - no need of abx  due to recent C diff will try to avoid unnecessary abx     >Mobile echodensity on Mitral valve - afebrile, no leucocytosis   appreciate cardiology - MARYCHUY done showed Mobile mass/vegetation 1.38 x 0.7 cm seen on posterior mitral valve leaflet P2 with mild to moderate MR likely consistent with endocarditis,  Left ventricular ejection fraction, by visual estimation, is 55to 60%, elevated CRP and ESR, patient has been started on Ceftriaxone and Vancomycin, 1 set of blood cultures negative, subsequent are pending, will monitor for fever spike and if feverish will get cultures.         >Fall - debility - multifactorial -  due to age related physical debility and CDIFF, Pt eval requested, Sw eval for rehab    >Positive troponin - noted on last admission, appreciate cardiology input -troponin flat, c/w aspirin, statin, due to sinus bradycardia stopped Metoprolol     >HTN - start amlodipine   Monitor BP    >Chronic hypercalcemia -   - likely primary hyperparathyroidism, f/u endocrinology as an outpatient, her calcium is going up today is >12, has been started on IV fluids, will monitor BMP    >DVT PPX- heparin

## 2019-03-31 NOTE — PROGRESS NOTE ADULT - SUBJECTIVE AND OBJECTIVE BOX
[facility]                                   [address] [place] [address]                                                      PHONE: [phone]                                                         FAX: [phone]  [email or web address]  ---------------------------------------------------------------------------------------------------------------------------------    Overnight events/patient complaints: comfortable in chair with family present      penicillins (Rash)    MEDICATIONS  (STANDING):  amLODIPine   Tablet 5 milliGRAM(s) Oral daily  aspirin enteric coated 325 milliGRAM(s) Oral daily  atorvastatin 10 milliGRAM(s) Oral at bedtime  cefTRIAXone   IVPB      cefTRIAXone   IVPB 2 Gram(s) IV Intermittent every 24 hours  heparin  Injectable 5000 Unit(s) SubCutaneous every 12 hours  lactated ringers. 1000 milliLiter(s) (60 mL/Hr) IV Continuous <Continuous>  levothyroxine 88 MICROGram(s) Oral daily  saccharomyces boulardii 250 milliGRAM(s) Oral two times a day  sodium chloride 0.9%. 1000 milliLiter(s) (75 mL/Hr) IV Continuous <Continuous>  vancomycin    Solution 250 milliGRAM(s) Oral every 6 hours  vancomycin  IVPB 750 milliGRAM(s) IV Intermittent every 12 hours    MEDICATIONS  (PRN):  nitroglycerin     SubLingual 0.4 milliGRAM(s) SubLingual every 5 minutes PRN Chest Pain      Vital Signs Last 24 Hrs  T(C): 36.7 (31 Mar 2019 04:50), Max: 36.9 (30 Mar 2019 10:36)  T(F): 98.1 (31 Mar 2019 04:50), Max: 98.4 (30 Mar 2019 10:36)  HR: 56 (31 Mar 2019 08:42) (54 - 57)  BP: 134/52 (31 Mar 2019 05:08) (112/63 - 152/71)  BP(mean): --  RR: 18 (31 Mar 2019 04:50) (18 - 20)  SpO2: --  ICU Vital Signs Last 24 Hrs  YADIRA AMITA  I&O's Detail    30 Mar 2019 07:01  -  31 Mar 2019 07:00  --------------------------------------------------------  IN:    sodium chloride 0.9%.: 1650 mL  Total IN: 1650 mL    OUT:  Total OUT: 0 mL    Total NET: 1650 mL        I&O's Summary    30 Mar 2019 07:01  -  31 Mar 2019 07:00  --------------------------------------------------------  IN: 1650 mL / OUT: 0 mL / NET: 1650 mL      Drug Dosing Weight  YADIRA BRYANORE      PHYSICAL EXAM:  General: NAD.  HEENT: Head; normocephalic.  Eyes: Pupils reactive.  Neck: Supple.  CARDIOVASCULAR: Normal S1 and S2.   LUNGS: Normal breath sounds bilaterally.  ABDOMEN: Soft.  EXTREMITIES: No clubbing, cyanosis or edema.   SKIN: warm.  NEURO: Alert/oriented.    PSYCH: normal affect.        LABS:                        13.0   6.6   )-----------( 257      ( 30 Mar 2019 08:13 )             39.8     03-30    137  |  105  |  25.0<H>  ----------------------------<  83  4.8   |  22.0  |  0.80    Ca    12.6<H>      30 Mar 2019 08:13      YADIRA LEVI      RADIOLOGY & ADDITIONAL STUDIES:    INTERPRETATION OF TELEMETRY (personally reviewed): NSR 60s    ECG: NSR 68, RBBB    ECHO:  1. Left ventricular ejection fraction, by visual estimation, is >75%.   2. Hyperdynamic global left ventricular systolic function.   3. Normal left ventricular internal cavity size.   4. Spectral Doppler shows restrictive pattern of left ventricular   myocardial filling (Grade III diastolic dysfunction).   5. Basal cavity obliteration visualized.   6. Normal right ventricular size and function.   7. Small pericardial effusion.   8. Moderate mitral annular calcification.   9. Peak transmitral mean gradient equals 4.0 mmHg, calculated mitral   valve area by pressure half time equals 1.24 cm² consistent with mild   mitral stenosis.  10. Thickening of the anterior and posterior mitral valve leaflets.  11. Trace tricuspid regurgitation.  12. Mild to moderate aortic regurgitation.  13. Trace pulmonic valve regurgitation.  14. The main pulmonary artery is normal in size.  15. Mobile echodenisty visualized measuring 1.5 x .7 x 1.0 cm. noted on   posterior mitral leaflet on atrial side.  16. Consider MARYCHUY for further evlauation of echodensity on mitral valve.    [id number] Nathan Alvarez MD, Electronically signed on 3/28/2019 at 11:14:37   AM      MARYCHUY:  1. Mobile mass/vegetation    1.38 x 0.7 cm seen on posterior mitral valve leaflet P2 with mild to   moderate MR likely consistent with endocarditis.   2. Left ventricular ejection fraction, by visual estimation, is 55 to   60%.   3. Mild mitral annular calcification.   4. Mild to moderate mitral valve regurgitation.   5. Mild tricuspid regurgitation.   6. Intact intra-atrial septum without shunt.   7. There is mild aortic root calcification.    [id number] Mati Wright MD, Electronically signed on 3/29/2019 at 5:02:39 PM       ASSESSMENT AND PLAN:  85y Female [facility]'d from [facility] after w/u for fall found to have C-dif and placed on oral Vanco. Pt was unable to get out of car and brought back to the ER.  s/p ILR in 12/18. Echo revealed mobile echodensity [1.5 X 0.7 X 1 cm]. MARYCHUY on Friday revealed evidence of endocarditis.    1. ID service following.  2. Continue with IV abx.  3. Follow up repeat blood cx's. Repeat if develops fever.  4. Monitor on telemetry.

## 2019-03-31 NOTE — PROGRESS NOTE ADULT - SUBJECTIVE AND OBJECTIVE BOX
YADIRA LEVI    [id number]    85y      Female    Patient is a 85y old  Female who presents with a chief complaint of generalized weakness (31 Mar 2019 10:29)      INTERVAL HPI/OVERNIGHT EVENTS:    Patient is doing ok, she has no fever, denies any diarrhea, nausea or vomiting, has no dizziness, chest pain, abdominal pain.     REVIEW OF SYSTEMS:    CONSTITUTIONAL: No fever, some fatigue  RESPIRATORY: No cough, No shortness of breath  CARDIOVASCULAR: No chest pain, palpitations  GASTROINTESTINAL: No abdominal, No nausea, vomiting  NEUROLOGICAL: No headaches,  loss of strength.  MISCELLANEOUS: No joint swelling or pain       Vital Signs Last 24 Hrs  T(C): 36.6 (31 Mar 2019 16:24), Max: 36.7 (31 Mar 2019 04:50)  T(F): 97.9 (31 Mar 2019 16:24), Max: 98.1 (31 Mar 2019 04:50)  HR: 55 (31 Mar 2019 16:24) (54 - 57)  BP: 135/69 (31 Mar 2019 16:24) (134/52 - 152/71)  RR: 20 (31 Mar 2019 11:17) (18 - 20)    PHYSICAL EXAM:    GENERAL: Elderly female looking comfortable  HEENT: PERRL, +EOMI  NECK: soft, Supple, No JVD,   CHEST/LUNG: Clear to auscultate bilaterally; No wheezing  HEART: S1S2+, Regular rate and rhythm; systolic murmurs  ABDOMEN: Soft, Nontender, Nondistended; Bowel sounds present  EXTREMITIES:  2+ Peripheral Pulses, No edema  SKIN: No rashes or lesions  NEURO: AAOX3, no focal deficits, no motor r sensory loss  PSYCH: normal mood    LABS:                        13.0   6.6   )-----------( 257      ( 30 Mar 2019 08:13 )             39.8     03-30    137  |  105  |  25.0<H>  ----------------------------<  83  4.8   |  22.0  |  0.80    Ca    12.6<H>      30 Mar 2019 08:13              I&O's Summary    30 Mar 2019 07:01  -  31 Mar 2019 07:00  --------------------------------------------------------  IN: 1650 mL / OUT: 0 mL / NET: 1650 mL    31 Mar 2019 07:01  -  31 Mar 2019 17:25  --------------------------------------------------------  IN: 600 mL / OUT: 0 mL / NET: 600 mL        MEDICATIONS  (STANDING):  amLODIPine   Tablet 5 milliGRAM(s) Oral daily  aspirin enteric coated 325 milliGRAM(s) Oral daily  atorvastatin 10 milliGRAM(s) Oral at bedtime  cefTRIAXone   IVPB      cefTRIAXone   IVPB 2 Gram(s) IV Intermittent every 24 hours  heparin  Injectable 5000 Unit(s) SubCutaneous every 12 hours  lactated ringers. 1000 milliLiter(s) (60 mL/Hr) IV Continuous <Continuous>  levothyroxine 88 MICROGram(s) Oral daily  saccharomyces boulardii 250 milliGRAM(s) Oral two times a day  vancomycin    Solution 250 milliGRAM(s) Oral every 6 hours  vancomycin  IVPB 750 milliGRAM(s) IV Intermittent every 12 hours    MEDICATIONS  (PRN):  nitroglycerin     SubLingual 0.4 milliGRAM(s) SubLingual every 5 minutes PRN Chest Pain

## 2019-04-01 DIAGNOSIS — Z79.2 LONG TERM (CURRENT) USE OF ANTIBIOTICS: ICD-10-CM

## 2019-04-01 DIAGNOSIS — I10 ESSENTIAL (PRIMARY) HYPERTENSION: ICD-10-CM

## 2019-04-01 DIAGNOSIS — A04.72 ENTEROCOLITIS DUE TO CLOSTRIDIUM DIFFICILE, NOT SPECIFIED AS RECURRENT: ICD-10-CM

## 2019-04-01 DIAGNOSIS — I38 ENDOCARDITIS, VALVE UNSPECIFIED: ICD-10-CM

## 2019-04-01 LAB
ANION GAP SERPL CALC-SCNC: 9 MMOL/L — SIGNIFICANT CHANGE UP (ref 5–17)
BUN SERPL-MCNC: 26 MG/DL — HIGH (ref 8–20)
CALCIUM SERPL-MCNC: 11.8 MG/DL — HIGH (ref 8.6–10.2)
CHLORIDE SERPL-SCNC: 106 MMOL/L — SIGNIFICANT CHANGE UP (ref 98–107)
CO2 SERPL-SCNC: 21 MMOL/L — LOW (ref 22–29)
CREAT SERPL-MCNC: 0.9 MG/DL — SIGNIFICANT CHANGE UP (ref 0.5–1.3)
GLUCOSE SERPL-MCNC: 94 MG/DL — SIGNIFICANT CHANGE UP (ref 70–115)
HCT VFR BLD CALC: 35.2 % — LOW (ref 37–47)
HGB BLD-MCNC: 11.5 G/DL — LOW (ref 12–16)
MCHC RBC-ENTMCNC: 30.9 PG — SIGNIFICANT CHANGE UP (ref 27–31)
MCHC RBC-ENTMCNC: 32.7 G/DL — SIGNIFICANT CHANGE UP (ref 32–36)
MCV RBC AUTO: 94.6 FL — SIGNIFICANT CHANGE UP (ref 81–99)
PLATELET # BLD AUTO: 220 K/UL — SIGNIFICANT CHANGE UP (ref 150–400)
POTASSIUM SERPL-MCNC: 4.6 MMOL/L — SIGNIFICANT CHANGE UP (ref 3.5–5.3)
POTASSIUM SERPL-SCNC: 4.6 MMOL/L — SIGNIFICANT CHANGE UP (ref 3.5–5.3)
RBC # BLD: 3.72 M/UL — LOW (ref 4.4–5.2)
RBC # FLD: 13.4 % — SIGNIFICANT CHANGE UP (ref 11–15.6)
SODIUM SERPL-SCNC: 136 MMOL/L — SIGNIFICANT CHANGE UP (ref 135–145)
WBC # BLD: 8.1 K/UL — SIGNIFICANT CHANGE UP (ref 4.8–10.8)
WBC # FLD AUTO: 8.1 K/UL — SIGNIFICANT CHANGE UP (ref 4.8–10.8)

## 2019-04-01 PROCEDURE — 76942 ECHO GUIDE FOR BIOPSY: CPT | Mod: 26,59

## 2019-04-01 PROCEDURE — 36569 INSJ PICC 5 YR+ W/O IMAGING: CPT

## 2019-04-01 PROCEDURE — 76937 US GUIDE VASCULAR ACCESS: CPT | Mod: 26,59

## 2019-04-01 PROCEDURE — 99232 SBSQ HOSP IP/OBS MODERATE 35: CPT

## 2019-04-01 PROCEDURE — 71045 X-RAY EXAM CHEST 1 VIEW: CPT | Mod: 26

## 2019-04-01 RX ADMIN — Medication 250 MILLIGRAM(S): at 17:59

## 2019-04-01 RX ADMIN — Medication 250 MILLIGRAM(S): at 05:36

## 2019-04-01 RX ADMIN — SODIUM CHLORIDE 60 MILLILITER(S): 9 INJECTION, SOLUTION INTRAVENOUS at 17:53

## 2019-04-01 RX ADMIN — ATORVASTATIN CALCIUM 10 MILLIGRAM(S): 80 TABLET, FILM COATED ORAL at 20:50

## 2019-04-01 RX ADMIN — Medication 250 MILLIGRAM(S): at 11:32

## 2019-04-01 RX ADMIN — HEPARIN SODIUM 5000 UNIT(S): 5000 INJECTION INTRAVENOUS; SUBCUTANEOUS at 05:35

## 2019-04-01 RX ADMIN — AMLODIPINE BESYLATE 5 MILLIGRAM(S): 2.5 TABLET ORAL at 05:35

## 2019-04-01 RX ADMIN — Medication 88 MICROGRAM(S): at 05:35

## 2019-04-01 RX ADMIN — CEFTRIAXONE 100 GRAM(S): 500 INJECTION, POWDER, FOR SOLUTION INTRAMUSCULAR; INTRAVENOUS at 17:54

## 2019-04-01 RX ADMIN — Medication 250 MILLIGRAM(S): at 17:54

## 2019-04-01 RX ADMIN — Medication 250 MILLIGRAM(S): at 05:35

## 2019-04-01 RX ADMIN — HEPARIN SODIUM 5000 UNIT(S): 5000 INJECTION INTRAVENOUS; SUBCUTANEOUS at 17:59

## 2019-04-01 RX ADMIN — Medication 325 MILLIGRAM(S): at 11:32

## 2019-04-01 RX ADMIN — Medication 250 MILLIGRAM(S): at 00:29

## 2019-04-01 NOTE — PROGRESS NOTE ADULT - ASSESSMENT
This is 84 yo woman with recurrent falls, s/p loop recorder in dec admitted for fall, weakness, recently admitted with C diff positive  on po vanco, during last admission  cardiology consulted loop recorder interrogated , no cardiac events , dehydration. Pt is readmitted on 03/25 for weakness, urine c/s sent during last admission growing E coli, pt denied urinary complaints, afebrile, normal WBC, repeat u/a no pyouria, no need of abx due to recent C diff will try to avoid unnecessary abx, seen by ID, patient was noted to have Positive troponin, seen by cardiology and was c/w aspirin, statin, she was on metoprolol due to sinus bradycardia stopped Metoprolol, patient had TTE done showed LVEF >75%, grade 3 diastolic dysfunction, Mobile echodensity visualized measuring 1.5 x .7 x 1.0 cm. noted on  posterior mitral leaflet on atrial side, MARYCHUY done showed Mobile mass/vegetation 1.38 x 0.7 cm seen on posterior mitral valve leaflet P2 with mild to moderate MR likely consistent with endocarditis,  Left ventricular ejection fraction, by visual estimation, is 55to 60%, CRP and ESR elevated, patient has been started on Ceftriaxone and Vancomycin, blood cultures 1st set negative, 2nd set pending, ID is following, she is also being continued with PO Vancomycin for Hx of C dif.    She has Hx of Fall, likely multifactorial, due to age related physical debility and C DIFF, leading to dehydration, Pt eval requested, Sw eval for rehab.   She has Hx of Chronic hypercalcemia, her  , likely primary hyperparathyroidism, f/u endocrinology as an outpatient, she was noted to have calcium trending up, given IV fluids.        A/P    >C diff- c/w Po vanco.     >urine c/s E coli - ?colonization- pt is asymptomatic, afebrile, normal WBC  repeat u/a - no pyouria - no need of abx  due to recent C diff will try to avoid unnecessary abx     >Mobile echodensity on Mitral valve - afebrile, no leucocytosis, appreciate cardiology - MARYCHUY done showed Mobile mass/vegetation 1.38 x 0.7 cm seen on posterior mitral valve leaflet P2 with mild to moderate MR likely consistent with endocarditis,  Left ventricular ejection fraction, by visual estimation, is 55to 60%, elevated CRP and ESR, patient has been started on Ceftriaxone and Vancomycin, all blood cultures negative, will monitor for fever spike and if feverish will get cultures, will discuss with cardiology to compare with her TTE done in november in the office to see if that mass was present that time and will discuss with ID for antibiotics duration.          >Fall - debility - multifactorial -  due to age related physical debility and CDIFF, Pt shaun requested, once medically clear she is going for rehab to [facility].     >Positive troponin - noted on last admission, appreciate cardiology input -troponin flat, c/w aspirin, statin, due to sinus bradycardia stopped Metoprolol     >HTN - start amlodipine   Monitor BP    >Chronic hypercalcemia -   - likely primary hyperparathyroidism, f/u endocrinology as an outpatient, her calcium is going up today is >12, has been started on IV fluids, will monitor BMP    >DVT PPX- heparin

## 2019-04-01 NOTE — PROGRESS NOTE ADULT - SUBJECTIVE AND OBJECTIVE BOX
YADIRA LEVI    [id number]    85y      Female    Patient is a 85y old  Female who presents with a chief complaint of generalized weakness (31 Mar 2019 17:25)      INTERVAL HPI/OVERNIGHT EVENTS:    Patient is doing ok, has no complains, no fever spikes, denies any diarrhea, nausea or vomiting, has no dizziness, chest pain, abdominal pain.     REVIEW OF SYSTEMS:    CONSTITUTIONAL: No fever, some fatigue  RESPIRATORY: No cough, No shortness of breath  CARDIOVASCULAR: No chest pain, palpitations  GASTROINTESTINAL: No abdominal, No nausea, vomiting  NEUROLOGICAL: No headaches,  loss of strength.  MISCELLANEOUS: No joint swelling or pain        Vital Signs Last 24 Hrs  T(C): 36.7 (01 Apr 2019 04:00), Max: 36.7 (31 Mar 2019 11:17)  T(F): 98.1 (01 Apr 2019 04:00), Max: 98.1 (31 Mar 2019 11:17)  HR: 61 (01 Apr 2019 08:32) (54 - 70)  BP: 135/63 (01 Apr 2019 04:00) (122/65 - 135/69)  RR: 20 (01 Apr 2019 04:00) (20 - 20)  SpO2: 91% (01 Apr 2019 04:00) (91% - 92%)    PHYSICAL EXAM:    GENERAL: Elderly female looking comfortable  HEENT: PERRL, +EOMI  NECK: soft, Supple, No JVD,   CHEST/LUNG: Clear to auscultate bilaterally; No wheezing  HEART: S1S2+, Regular rate and rhythm; systolic murmurs  ABDOMEN: Soft, Nontender, Nondistended; Bowel sounds present  EXTREMITIES:  2+ Peripheral Pulses, No edema  SKIN: No rashes or lesions  NEURO: AAOX3, no focal deficits, no motor r sensory loss  PSYCH: normal mood    LABS:                        11.5   8.1   )-----------( 220      ( 01 Apr 2019 05:05 )             35.2     04-01    136  |  106  |  26.0<H>  ----------------------------<  94  4.6   |  21.0<L>  |  0.90    Ca    11.8<H>      01 Apr 2019 05:05              I&O's Summary    31 Mar 2019 07:01  -  01 Apr 2019 07:00  --------------------------------------------------------  IN: 1560 mL / OUT: 0 mL / NET: 1560 mL        MEDICATIONS  (STANDING):  amLODIPine   Tablet 5 milliGRAM(s) Oral daily  aspirin enteric coated 325 milliGRAM(s) Oral daily  atorvastatin 10 milliGRAM(s) Oral at bedtime  cefTRIAXone   IVPB      cefTRIAXone   IVPB 2 Gram(s) IV Intermittent every 24 hours  heparin  Injectable 5000 Unit(s) SubCutaneous every 12 hours  lactated ringers. 1000 milliLiter(s) (60 mL/Hr) IV Continuous <Continuous>  levothyroxine 88 MICROGram(s) Oral daily  saccharomyces boulardii 250 milliGRAM(s) Oral two times a day  vancomycin    Solution 250 milliGRAM(s) Oral every 6 hours  vancomycin  IVPB 750 milliGRAM(s) IV Intermittent every 12 hours    MEDICATIONS  (PRN):  nitroglycerin     SubLingual 0.4 milliGRAM(s) SubLingual every 5 minutes PRN Chest Pain

## 2019-04-01 NOTE — PROCEDURE NOTE - NSINFORMCONSENT_GEN_A_CORE
son present on consent/Benefits, risks, and possible complications of procedure explained to patient/caregiver who verbalized understanding and gave written consent.

## 2019-04-01 NOTE — PROGRESS NOTE ADULT - SUBJECTIVE AND OBJECTIVE BOX
[facility], THE HEART [facility]ENTER                                   [address] [place] [address]                                                      PHONE: [phone]                                                         FAX: [phone]  [email or web address]  ---------------------------------------------------------------------------------------------------------------------------------    Reason for follow-up: endocarditis     Overnight events/patient complaints:    INTERPRETATION OF TELEMETRY (personally reviewed):    ECG: < from: 12 Lead ECG (03.25.19 @ 21:51) >  Right bundle branch block, LAD, Septal infarct , age undetermined    ECHO:  < from: MARYCHUY Echo Doppler (03.29.19 @ 16:22) >   1. Mobile mass/vegetation  1.38 x 0.7 cm seen on posterior mitral valve leaflet P2 with mild to moderate MR likely consistent with endocarditis.   2. Left ventricular ejection fraction, by visual estimation, is 55 to 60%.   3. Mild mitral annular calcification.   4. Mild to moderate mitral valve regurgitation.   5. Mild tricuspid regurgitation.   6. Intact intra-atrial septum without shunt.   7. There is mild aortic root calcification.    I&O's Summary    31 Mar 2019 07:01  -  01 Apr 2019 07:00  --------------------------------------------------------  IN: 1560 mL / OUT: 0 mL / NET: 1560 mL    MEDICATIONS  (STANDING):  amLODIPine   Tablet 5 milliGRAM(s) Oral daily  aspirin enteric coated 325 milliGRAM(s) Oral daily  atorvastatin 10 milliGRAM(s) Oral at bedtime  cefTRIAXone   IVPB      cefTRIAXone   IVPB 2 Gram(s) IV Intermittent every 24 hours  heparin  Injectable 5000 Unit(s) SubCutaneous every 12 hours  lactated ringers. 1000 milliLiter(s) (60 mL/Hr) IV Continuous <Continuous>  levothyroxine 88 MICROGram(s) Oral daily  saccharomyces boulardii 250 milliGRAM(s) Oral two times a day  vancomycin    Solution 250 milliGRAM(s) Oral every 6 hours  vancomycin  IVPB 750 milliGRAM(s) IV Intermittent every 12 hours    MEDICATIONS  (PRN):  nitroglycerin     SubLingual 0.4 milliGRAM(s) SubLingual every 5 minutes PRN Chest Pain      Vital Signs Last 24 Hrs  T(C): 36.7 (01 Apr 2019 04:00), Max: 36.7 (31 Mar 2019 11:17)  T(F): 98.1 (01 Apr 2019 04:00), Max: 98.1 (31 Mar 2019 11:17)  HR: 61 (01 Apr 2019 08:32) (54 - 70)  BP: 135/63 (01 Apr 2019 04:00) (122/65 - 135/69)  BP(mean): --  RR: 20 (01 Apr 2019 04:00) (20 - 20)  SpO2: 91% (01 Apr 2019 04:00) (91% - 92%)  ICU Vital Signs Last 24 Hrs    PHYSICAL EXAM:  General: Appears well developed, well nourished alert and cooperative.  HEENT: Head; normocephalic, atraumatic.Pupils reactive, cornea wnl. Neck supple, no nodes adenopathy, no JVD  CARDIOVASCULAR: Normal S1 and S2, 1/6 RODOLFO, no rub, gallop or lift.   LUNGS: No rales, rhonchi or wheeze. Normal breath sounds bilaterally.  ABDOMEN: Soft, nontender without mass or organomegaly. bowel sounds normoactive.  EXTREMITIES: No clubbing, cyanosis or edema.   SKIN: warm and dry with normal turgor.  NEURO: Alert/oriented x 3/normal motor exam.   PSYCH: normal affect.        LABS:                        11.5   8.1   )-----------( 220      ( 01 Apr 2019 05:05 )             35.2     04-01    136  |  106  |  26.0<H>  ----------------------------<  94  4.6   |  21.0<L>  |  0.90    Ca    11.8<H>      01 Apr 2019 05:05    ASSESSMENT AND PLAN:  In summary, YADIRA LEVI is a 85y Female with past medical history significant for recurrent falls s/p loop recorder 12/18 recently admitted with C diff who was readmitted post discharge for profound weakness with inability to get out of car found to have mild troponin elevation and new mobile echodensity on the mitral valve (P2) with mild to moderate MR concerning for endocarditis.    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 Please call for changes in the clinical status. Thank you for allowing [facility] to participate in the care of this patient.  Please feel free to call with any questions or concerns. [facility], [facility]                                   [address] [place] [address]                                                      PHONE: [phone]                                                         FAX: [phone]  [email or web address]  ---------------------------------------------------------------------------------------------------------------------------------    Reason for follow-up: endocarditis     Overnight events/patient complaints: no acute events. denies chest pain and shortness of breath     INTERPRETATION OF TELEMETRY (personally reviewed): no events     ECG: < from: 12 Lead ECG (03.25.19 @ 21:51) >  Right bundle branch block, LAD, Septal infarct , age undetermined    ECHO:  < from: MARYCHUY Echo Doppler (03.29.19 @ 16:22) >   1. Mobile mass/vegetation  1.38 x 0.7 cm seen on posterior mitral valve leaflet P2 with mild to moderate MR likely consistent with endocarditis.   2. Left ventricular ejection fraction, by visual estimation, is 55 to 60%.   3. Mild mitral annular calcification.   4. Mild to moderate mitral valve regurgitation.   5. Mild tricuspid regurgitation.   6. Intact intra-atrial septum without shunt.   7. There is mild aortic root calcification.    I&O's Summary    31 Mar 2019 07:01  -  01 Apr 2019 07:00  --------------------------------------------------------  IN: 1560 mL / OUT: 0 mL / NET: 1560 mL    MEDICATIONS  (STANDING):  amLODIPine   Tablet 5 milliGRAM(s) Oral daily  aspirin enteric coated 325 milliGRAM(s) Oral daily  atorvastatin 10 milliGRAM(s) Oral at bedtime  cefTRIAXone   IVPB      cefTRIAXone   IVPB 2 Gram(s) IV Intermittent every 24 hours  heparin  Injectable 5000 Unit(s) SubCutaneous every 12 hours  lactated ringers. 1000 milliLiter(s) (60 mL/Hr) IV Continuous <Continuous>  levothyroxine 88 MICROGram(s) Oral daily  saccharomyces boulardii 250 milliGRAM(s) Oral two times a day  vancomycin    Solution 250 milliGRAM(s) Oral every 6 hours  vancomycin  IVPB 750 milliGRAM(s) IV Intermittent every 12 hours    MEDICATIONS  (PRN):  nitroglycerin     SubLingual 0.4 milliGRAM(s) SubLingual every 5 minutes PRN Chest Pain      Vital Signs Last 24 Hrs  T(C): 36.7 (01 Apr 2019 04:00), Max: 36.7 (31 Mar 2019 11:17)  T(F): 98.1 (01 Apr 2019 04:00), Max: 98.1 (31 Mar 2019 11:17)  HR: 61 (01 Apr 2019 08:32) (54 - 70)  BP: 135/63 (01 Apr 2019 04:00) (122/65 - 135/69)  BP(mean): --  RR: 20 (01 Apr 2019 04:00) (20 - 20)  SpO2: 91% (01 Apr 2019 04:00) (91% - 92%)  ICU Vital Signs Last 24 Hrs    PHYSICAL EXAM:  General: Appears well developed, well nourished alert and cooperative.  HEENT: Head; normocephalic, atraumatic.Pupils reactive, cornea wnl. Neck supple, no nodes adenopathy, no JVD  CARDIOVASCULAR: Normal S1 and S2, 1/6 RODOLFO, no rub, gallop or lift.   LUNGS: No rales, rhonchi or wheeze. Normal breath sounds bilaterally.  ABDOMEN: Soft, nontender without mass or organomegaly. bowel sounds normoactive.  EXTREMITIES: No clubbing, cyanosis or edema.   SKIN: warm and dry with normal turgor.  NEURO: Alert/oriented x 3/normal motor exam.   PSYCH: normal affect.        LABS:                        11.5   8.1   )-----------( 220      ( 01 Apr 2019 05:05 )             35.2     04-01    136  |  106  |  26.0<H>  ----------------------------<  94  4.6   |  21.0<L>  |  0.90    Ca    11.8<H>      01 Apr 2019 05:05    ASSESSMENT AND PLAN:  In summary, YADIRA LEVI is a 85y Female with past medical history significant for recurrent falls s/p loop recorder 12/18 recently admitted with C diff who was readmitted post discharge for profound weakness with inability to get out of car found to have mild troponin elevation and new mobile echodensity on the mitral valve (P2) with mild to moderate MR concerning for endocarditis.    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 Please call for changes in the clinical status. Thank you for allowing [facility] to participate in the care of this patient.  Please feel free to call with any questions or concerns.

## 2019-04-01 NOTE — PROCEDURE NOTE - NSPOSTCAREGUIDE_GEN_A_CORE
Care for catheter as per unit/ICU protocols/Instructed patient/caregiver to follow-up with primary care physician/Instructed patient/caregiver regarding signs and symptoms of infection/Verbal/written post procedure instructions were given to patient/caregiver

## 2019-04-01 NOTE — PROGRESS NOTE ADULT - ASSESSMENT
This 85 y.o. F   here for falls  recent C diff on  Vancomycin  - clinically w/o diarrhea.     Here for workup of fall  found with   Mobile mass/vegetation  1.38 x 0.7 cm seen on posterior mitral valve leaflet P2 with mild to moderate MR likely consistent with endocarditis.    ESR is elevated at 47  and CRP is 1.27  Cultures sent are negative so far.  LAB will hold for HACEK organisms  - continue Vancomycin and Ceftriaxone  - PICC placement for 4 weeks of IV Ceftriaxone and Vancomycin - Needs WEEKLY CBC CMP ESR CRP Vancomycin trough faxed to my office - [phone] (fax)      Regarding C diff diarrhea  - suggest at time of discharge to do the following:  Vancomycin 250mg PO Q6H x 14 days, then   Vancomycin 250mg PO Q8H x 7 days, then   Vancomycin 250mg PO Q12H x 7 days, then   Vancomycin 250mg PO Q24H  - until visit with INFECTIOUS DIEASE office. This 85 y.o. F   here for falls  recent C diff on  Vancomycin  - clinically w/o diarrhea.     Here for workup of fall  found with   Mobile mass/vegetation  1.38 x 0.7 cm seen on posterior mitral valve leaflet P2 with mild to moderate MR likely consistent with endocarditis.    ESR is elevated at 47  and CRP is 1.27  Cultures sent are negative so far.  LAB will hold for HACEK organisms  - continue Vancomycin and Ceftriaxone  - PICC placement for 4 weeks of IV Ceftriaxone and Vancomycin - Needs WEEKLY CBC CMP ESR CRP Vancomycin trough faxed to my office - [phone] (fax)  END DATE IS 4/25/19, INCLUSIVELY.     Regarding C diff diarrhea  - suggest at time of discharge to do the following:  Vancomycin 250mg PO Q6H x 14 days, then   Vancomycin 250mg PO Q8H x 7 days, then   Vancomycin 250mg PO Q12H x 7 days, then   Vancomycin 250mg PO Q24H  - until visit with INFECTIOUS DIEASE office.

## 2019-04-01 NOTE — PROGRESS NOTE ADULT - SUBJECTIVE AND OBJECTIVE BOX
[facility]  INFECTIOUS DISEASES AND INTERNAL MEDICINE at [place]  =======================================================  Joe Castillo MD  Diplomates American Board of Internal Medicine and Infectious Diseases  =======================================================    MRN-[id number]  YADIRA AMITA   follow up for:  culture negative endocarditis; c diff diarrhea  patient seen and examined.     no fevers   PICC line being placed today    ===================================================  REVIEW OF SYSTEMS:  as above  all other ROS negative    =======================================================  Allergies  penicillins (Rash)    Antibiotics:  cefTRIAXone   IVPB      cefTRIAXone   IVPB 2 Gram(s) IV Intermittent every 24 hours  vancomycin    Solution 250 milliGRAM(s) Oral every 6 hours  vancomycin  IVPB 750 milliGRAM(s) IV Intermittent every 12 hours    ======================================================  Physical Exam:  ============  T(F): 98.1 (01 Apr 2019 11:10), Max: 98.1 (31 Mar 2019 04:50)  HR: 56 (01 Apr 2019 11:10)  BP: 135/63 (01 Apr 2019 11:10)  RR: 16 (01 Apr 2019 11:10)  SpO2: 91% (01 Apr 2019 04:00) (91% - 92%)    HENT: Normocephalic, Oral mucosa is moist, UPPER DENTURES  Neck: Supple, No lymphadenopathy.  Respiratory: Lungs are clear to auscultation ANTERIORLY  Cardiovascular: Normal rate, Regular rhythm, + MURMUR  IMPLANTED CARDIAC DEVICE IN LEFT CHEST LATERAL TO THE STERNUM  Gastrointestinal: Soft, Non-tender, Non-distended, Normal bowel sounds.  Genitourinary: No costovertebral angle tenderness.  Lymphatics: No lymphadenopathy neck,   Musculoskeletal: Normal range of motion, Normal strength.  Integumentary: No rash.  Neurologic: Alert, Oriented, No focal deficits, Cranial Nerves II-XII are grossly intact.  Psychiatric: Appropriate mood & affect.    =======================================================  Labs:                        11.5   8.1   )-----------( 220      ( 01 Apr 2019 05:05 )             35.2     04-01    136  |  106  |  26.0<H>  ----------------------------<  94  4.6   |  21.0<L>  |  0.90    Ca    11.8<H>      01 Apr 2019 05:05        Culture - Blood (collected 03-29-19 @ 20:38)  Source: .Blood    Culture - Blood (collected 03-29-19 @ 20:37)  Source: .Blood    Culture - Blood (collected 03-28-19 @ 12:45)  Source: .Blood    Culture - Blood (collected 03-28-19 @ 12:45)  Source: .Blood    Culture - Urine (collected 03-26-19 @ 12:58)  Source: .Urine  Final Report (03-28-19 @ 09:26):    >100,000 CFU/ml Escherichia coli  Organism: Escherichia coli (03-28-19 @ 09:26)  Organism: Escherichia coli (03-28-19 @ 09:26)    Sensitivities:      -  Amikacin: S <=16      -  Ampicillin: R >16 These ampicillin results predict results for amoxicillin      -  Ampicillin/Sulbactam: R >16/8      -  Aztreonam: S <=4      -  Cefazolin: S <=8 For uncomplicated UTI with K. pneumoniae, E. coli, or P. mirablis: TEX <=16 is sensitive and TEX >=32 is resistant. This also predicts results for oral agents cefaclor, cefdinir, cefpodoxime, cefprozil, cefuroxime axetil, cephalexin and locarbef for uncomplicated UTI. Note that some isolates may be susceptible to these agents while testing resistant to cefazolin.      -  Cefepime: S <=4      -  Cefoxitin: S <=8      -  Ceftriaxone: S <=1 Enterobacter, Citrobacter, and Serratia may develop resistance during prolonged therapy      -  Ciprofloxacin: R >2      -  Ertapenem: S <=1      -  Gentamicin: S <=4      -  Imipenem: S <=1      -  Levofloxacin: R >4      -  Meropenem: S <=1      -  Nitrofurantoin: S <=32 Should not be used to treat pyelonephritis      -  Piperacillin/Tazobactam: S <=16      -  Tigecycline: S <=2      -  Tobramycin: S <=4      -  Trimethoprim/Sulfamethoxazole: S <=2/38      Method Type: TEX    Culture - Urine (collected 03-24-19 @ 07:50)  Source: .Urine  Final Report (03-26-19 @ 08:55):    >100,000 CFU/ml Escherichia coli  Organism: Escherichia coli (03-26-19 @ 08:55)  Organism: Escherichia coli (03-26-19 @ 08:55)    Sensitivities:      -  Amikacin: S <=16      -  Ampicillin: R >16 These ampicillin results predict results for amoxicillin      -  Ampicillin/Sulbactam: I 16/8      -  Aztreonam: S <=4      -  Cefazolin: S <=8 For uncomplicated UTI with K. pneumoniae, E. coli, or P. mirablis: TEX <=16 is sensitive and TEX >=32 is resistant. This also predicts results for oral agents cefaclor, cefdinir, cefpodoxime, cefprozil, cefuroxime axetil, cephalexin and locarbef for uncomplicated UTI. Note that some isolates may be susceptible to these agents while testing resistant to cefazolin.      -  Cefepime: S <=4      -  Cefoxitin: S <=8      -  Ceftriaxone: S <=1 Enterobacter, Citrobacter, and Serratia may develop resistance during prolonged therapy      -  Ciprofloxacin: R >2      -  Ertapenem: S <=1      -  Gentamicin: S <=4      -  Imipenem: S <=1      -  Levofloxacin: R >4      -  Meropenem: S <=1      -  Nitrofurantoin: S <=32 Should not be used to treat pyelonephritis      -  Piperacillin/Tazobactam: S <=16      -  Tigecycline: S <=2      -  Tobramycin: S <=4      -  Trimethoprim/Sulfamethoxazole: S <=2/38      Method Type: TEX    GI PCR Panel, Stool (collected 03-22-19 @ 19:21)  Source: .Stool  Final Report (03-22-19 @ 22:23):    GI PCR Results: NOT detected    *******Please Note:*******    GI panel PCR evaluates for:    Campylobacter, Plesiomonas shigelloides, Salmonella,    Vibrio, Yersinia enterocolitica, Enteroaggregative    Escherichia coli (EAEC), Enteropathogenic E.coli (EPEC),    Enterotoxigenic E. coli (ETEC) lt/st, Shiga-like    toxin-producing E. coli (STEC) stx1/stx2,    Shigella/ Enteroinvasive E. coli (EIEC), Cryptosporidium,    Cyclospora cayetanensis, Entamoeba histolytica,    Giardia lamblia, Adenovirus F 40/41, Astrovirus,    Norovirus GI/GII, Rotavirus A, Sapovirus

## 2019-04-01 NOTE — PROCEDURE NOTE - NSICDXPROCEDURE_GEN_ALL_CORE_FT
PROCEDURES:  US guided needle placement 01-Apr-2019 13:54:32  Bernardo Ibanez  US guided vascular access 01-Apr-2019 13:54:22 Patent right basilic vein Bernardo Ibanez  Insertion, PICC, with imaging guidance 01-Apr-2019 13:54:11 4FR 40CM length 27CIRC [employer] POWER PICC ns flush good heme back right basilic vein Bernardo Ibanez

## 2019-04-01 NOTE — PHARMACOTHERAPY INTERVENTION NOTE - COMMENTS
Vancomycin trough ordered for 5:00 on 4/2/2019, 1 hour prior to scheduled dose.   Will adjust dose to maintain a trough of 15-20

## 2019-04-01 NOTE — PROCEDURE NOTE - NSPOSTPRCRAD_GEN_A_CORE
chest radiograph/postion of catheter/line in appropriate postion/ultrasound/depth of insertion/line adjusted to depth of insertion

## 2019-04-02 ENCOUNTER — TRANSCRIPTION ENCOUNTER (OUTPATIENT)
Age: 84
End: 2019-04-02

## 2019-04-02 VITALS
RESPIRATION RATE: 18 BRPM | SYSTOLIC BLOOD PRESSURE: 143 MMHG | HEART RATE: 65 BPM | OXYGEN SATURATION: 95 % | TEMPERATURE: 98 F | DIASTOLIC BLOOD PRESSURE: 72 MMHG

## 2019-04-02 LAB
CULTURE RESULTS: SIGNIFICANT CHANGE UP
CULTURE RESULTS: SIGNIFICANT CHANGE UP
SPECIMEN SOURCE: SIGNIFICANT CHANGE UP
SPECIMEN SOURCE: SIGNIFICANT CHANGE UP
VANCOMYCIN TROUGH SERPL-MCNC: 23.7 UG/ML — HIGH (ref 10–20)

## 2019-04-02 PROCEDURE — 70450 CT HEAD/BRAIN W/O DYE: CPT

## 2019-04-02 PROCEDURE — 81001 URINALYSIS AUTO W/SCOPE: CPT

## 2019-04-02 PROCEDURE — 96360 HYDRATION IV INFUSION INIT: CPT

## 2019-04-02 PROCEDURE — 99291 CRITICAL CARE FIRST HOUR: CPT | Mod: 25

## 2019-04-02 PROCEDURE — 85652 RBC SED RATE AUTOMATED: CPT

## 2019-04-02 PROCEDURE — 93005 ELECTROCARDIOGRAM TRACING: CPT

## 2019-04-02 PROCEDURE — 93320 DOPPLER ECHO COMPLETE: CPT

## 2019-04-02 PROCEDURE — 99239 HOSP IP/OBS DSCHRG MGMT >30: CPT

## 2019-04-02 PROCEDURE — 85610 PROTHROMBIN TIME: CPT

## 2019-04-02 PROCEDURE — 80048 BASIC METABOLIC PNL TOTAL CA: CPT

## 2019-04-02 PROCEDURE — 87040 BLOOD CULTURE FOR BACTERIA: CPT

## 2019-04-02 PROCEDURE — 83605 ASSAY OF LACTIC ACID: CPT

## 2019-04-02 PROCEDURE — 80053 COMPREHEN METABOLIC PANEL: CPT

## 2019-04-02 PROCEDURE — 97163 PT EVAL HIGH COMPLEX 45 MIN: CPT

## 2019-04-02 PROCEDURE — 87103 BLOOD FUNGUS CULTURE: CPT

## 2019-04-02 PROCEDURE — 71045 X-RAY EXAM CHEST 1 VIEW: CPT

## 2019-04-02 PROCEDURE — 85027 COMPLETE CBC AUTOMATED: CPT

## 2019-04-02 PROCEDURE — 84484 ASSAY OF TROPONIN QUANT: CPT

## 2019-04-02 PROCEDURE — 93325 DOPPLER ECHO COLOR FLOW MAPG: CPT

## 2019-04-02 PROCEDURE — 83735 ASSAY OF MAGNESIUM: CPT

## 2019-04-02 PROCEDURE — 87086 URINE CULTURE/COLONY COUNT: CPT

## 2019-04-02 PROCEDURE — 36415 COLL VENOUS BLD VENIPUNCTURE: CPT

## 2019-04-02 PROCEDURE — 86140 C-REACTIVE PROTEIN: CPT

## 2019-04-02 PROCEDURE — 87186 SC STD MICRODIL/AGAR DIL: CPT

## 2019-04-02 PROCEDURE — 93312 ECHO TRANSESOPHAGEAL: CPT

## 2019-04-02 PROCEDURE — 82550 ASSAY OF CK (CPK): CPT

## 2019-04-02 PROCEDURE — 93306 TTE W/DOPPLER COMPLETE: CPT

## 2019-04-02 PROCEDURE — 80202 ASSAY OF VANCOMYCIN: CPT

## 2019-04-02 RX ORDER — VANCOMYCIN HCL 1 G
500 VIAL (EA) INTRAVENOUS
Qty: 0 | Refills: 0 | DISCHARGE
Start: 2019-04-02

## 2019-04-02 RX ORDER — ATORVASTATIN CALCIUM 80 MG/1
1 TABLET, FILM COATED ORAL
Qty: 0 | Refills: 0 | COMMUNITY

## 2019-04-02 RX ORDER — CHLORHEXIDINE GLUCONATE 213 G/1000ML
1 SOLUTION TOPICAL DAILY
Qty: 0 | Refills: 0 | Status: DISCONTINUED | OUTPATIENT
Start: 2019-04-02 | End: 2019-04-02

## 2019-04-02 RX ORDER — ATORVASTATIN CALCIUM 80 MG/1
1 TABLET, FILM COATED ORAL
Qty: 0 | Refills: 0 | DISCHARGE
Start: 2019-04-02

## 2019-04-02 RX ORDER — LEVOTHYROXINE SODIUM 125 MCG
1 TABLET ORAL
Qty: 0 | Refills: 0 | COMMUNITY
Start: 2019-04-02

## 2019-04-02 RX ORDER — VANCOMYCIN HCL 1 G
500 VIAL (EA) INTRAVENOUS EVERY 12 HOURS
Qty: 0 | Refills: 0 | Status: DISCONTINUED | OUTPATIENT
Start: 2019-04-02 | End: 2019-04-02

## 2019-04-02 RX ORDER — LEVOTHYROXINE SODIUM 125 MCG
1 TABLET ORAL
Qty: 0 | Refills: 0 | DISCHARGE
Start: 2019-04-02

## 2019-04-02 RX ORDER — AMLODIPINE BESYLATE 2.5 MG/1
1 TABLET ORAL
Qty: 0 | Refills: 0 | DISCHARGE
Start: 2019-04-02

## 2019-04-02 RX ORDER — ASPIRIN/CALCIUM CARB/MAGNESIUM 324 MG
1 TABLET ORAL
Qty: 0 | Refills: 0 | DISCHARGE
Start: 2019-04-02

## 2019-04-02 RX ADMIN — SODIUM CHLORIDE 60 MILLILITER(S): 9 INJECTION, SOLUTION INTRAVENOUS at 10:54

## 2019-04-02 RX ADMIN — AMLODIPINE BESYLATE 5 MILLIGRAM(S): 2.5 TABLET ORAL at 05:35

## 2019-04-02 RX ADMIN — Medication 88 MICROGRAM(S): at 05:35

## 2019-04-02 RX ADMIN — Medication 250 MILLIGRAM(S): at 05:35

## 2019-04-02 RX ADMIN — Medication 250 MILLIGRAM(S): at 00:34

## 2019-04-02 RX ADMIN — CHLORHEXIDINE GLUCONATE 1 APPLICATION(S): 213 SOLUTION TOPICAL at 10:55

## 2019-04-02 RX ADMIN — Medication 325 MILLIGRAM(S): at 10:55

## 2019-04-02 RX ADMIN — Medication 250 MILLIGRAM(S): at 10:55

## 2019-04-02 RX ADMIN — HEPARIN SODIUM 5000 UNIT(S): 5000 INJECTION INTRAVENOUS; SUBCUTANEOUS at 05:34

## 2019-04-02 NOTE — PROGRESS NOTE ADULT - SUBJECTIVE AND OBJECTIVE BOX
[facility]  INFECTIOUS DISEASES AND INTERNAL MEDICINE at [place]  =======================================================  Joe Castillo MD  Diplomates American Board of Internal Medicine and Infectious Diseases  =======================================================    [id number]  YADIRA AMITA   follow up for:  culture negative endocarditis; c diff diarrhea  patient seen and examined.     no fevers   s/p PICC line    ===================================================  REVIEW OF SYSTEMS:  as above  all other ROS negative    =======================================================  Allergies  penicillins (Rash)    Antibiotics:  cefTRIAXone   IVPB      cefTRIAXone   IVPB 2 Gram(s) IV Intermittent every 24 hours  vancomycin    Solution 250 milliGRAM(s) Oral every 6 hours  vancomycin  IVPB 500 milliGRAM(s) IV Intermittent every 12 hours        ======================================================  Physical Exam:  ============  T(F): 98.1 (02 Apr 2019 11:00), Max: 98.6 (01 Apr 2019 20:45)  HR: 56 (02 Apr 2019 11:00)  BP: 121/68 (02 Apr 2019 11:00)  RR: 18 (02 Apr 2019 11:00)  SpO2: 95% (02 Apr 2019 11:00) (91% - 95%)    HENT: Normocephalic, Oral mucosa is moist, UPPER DENTURES  Neck: Supple, No lymphadenopathy.  Respiratory: Lungs are clear to auscultation ANTERIORLY  Cardiovascular: Normal rate, Regular rhythm, + MURMUR  IMPLANTED CARDIAC DEVICE IN LEFT CHEST LATERAL TO THE STERNUM  Gastrointestinal: Soft, Non-tender, Non-distended, Normal bowel sounds.  Genitourinary: No costovertebral angle tenderness.  Lymphatics: No lymphadenopathy neck,   Musculoskeletal: Normal range of motion, Normal strength.  Integumentary: No rash.  Neurologic: Alert, Oriented, No focal deficits, Cranial Nerves II-XII are grossly intact.  Psychiatric: Appropriate mood & affect.    =======================================================  Labs:                        11.5   8.1   )-----------( 220      ( 01 Apr 2019 05:05 )             35.2       WBC Count: 8.1 K/uL (04-01-19 @ 05:05)  WBC Count: 6.6 K/uL (03-30-19 @ 08:13)  WBC Count: 6.4 K/uL (03-28-19 @ 12:43)      04-01    136  |  106  |  26.0<H>  ----------------------------<  94  4.6   |  21.0<L>  |  0.90    Ca    11.8<H>      01 Apr 2019 05:05        Culture - Blood (collected 03-29-19 @ 20:38)  Source: .Blood    Culture - Blood (collected 03-29-19 @ 20:37)  Source: .Blood    Culture - Blood (collected 03-28-19 @ 12:45)  Source: .Blood    Culture - Blood (collected 03-28-19 @ 12:45)  Source: .Blood    Culture - Urine (collected 03-26-19 @ 12:58)  Source: .Urine  Final Report (03-28-19 @ 09:26):    >100,000 CFU/ml Escherichia coli  Organism: Escherichia coli (03-28-19 @ 09:26)  Organism: Escherichia coli (03-28-19 @ 09:26)    Sensitivities:      -  Amikacin: S <=16      -  Ampicillin: R >16 These ampicillin results predict results for amoxicillin      -  Ampicillin/Sulbactam: R >16/8      -  Aztreonam: S <=4      -  Cefazolin: S <=8 For uncomplicated UTI with K. pneumoniae, E. coli, or P. mirablis: TEX <=16 is sensitive and TEX >=32 is resistant. This also predicts results for oral agents cefaclor, cefdinir, cefpodoxime, cefprozil, cefuroxime axetil, cephalexin and locarbef for uncomplicated UTI. Note that some isolates may be susceptible to these agents while testing resistant to cefazolin.      -  Cefepime: S <=4      -  Cefoxitin: S <=8      -  Ceftriaxone: S <=1 Enterobacter, Citrobacter, and Serratia may develop resistance during prolonged therapy      -  Ciprofloxacin: R >2      -  Ertapenem: S <=1      -  Gentamicin: S <=4      -  Imipenem: S <=1      -  Levofloxacin: R >4      -  Meropenem: S <=1      -  Nitrofurantoin: S <=32 Should not be used to treat pyelonephritis      -  Piperacillin/Tazobactam: S <=16      -  Tigecycline: S <=2      -  Tobramycin: S <=4      -  Trimethoprim/Sulfamethoxazole: S <=2/38      Method Type: TEX    Culture - Urine (collected 03-24-19 @ 07:50)  Source: .Urine  Final Report (03-26-19 @ 08:55):    >100,000 CFU/ml Escherichia coli  Organism: Escherichia coli (03-26-19 @ 08:55)  Organism: Escherichia coli (03-26-19 @ 08:55)    Sensitivities:      -  Amikacin: S <=16      -  Ampicillin: R >16 These ampicillin results predict results for amoxicillin      -  Ampicillin/Sulbactam: I 16/8      -  Aztreonam: S <=4      -  Cefazolin: S <=8 For uncomplicated UTI with K. pneumoniae, E. coli, or P. mirablis: TEX <=16 is sensitive and TEX >=32 is resistant. This also predicts results for oral agents cefaclor, cefdinir, cefpodoxime, cefprozil, cefuroxime axetil, cephalexin and locarbef for uncomplicated UTI. Note that some isolates may be susceptible to these agents while testing resistant to cefazolin.      -  Cefepime: S <=4      -  Cefoxitin: S <=8      -  Ceftriaxone: S <=1 Enterobacter, Citrobacter, and Serratia may develop resistance during prolonged therapy      -  Ciprofloxacin: R >2      -  Ertapenem: S <=1      -  Gentamicin: S <=4      -  Imipenem: S <=1      -  Levofloxacin: R >4      -  Meropenem: S <=1      -  Nitrofurantoin: S <=32 Should not be used to treat pyelonephritis      -  Piperacillin/Tazobactam: S <=16      -  Tigecycline: S <=2      -  Tobramycin: S <=4      -  Trimethoprim/Sulfamethoxazole: S <=2/38      Method Type: TEX    GI PCR Panel, Stool (collected 03-22-19 @ 19:21)  Source: .Stool  Final Report (03-22-19 @ 22:23):    GI PCR Results: NOT detected    *******Please Note:*******    GI panel PCR evaluates for:    Campylobacter, Plesiomonas shigelloides, Salmonella,    Vibrio, Yersinia enterocolitica, Enteroaggregative    Escherichia coli (EAEC), Enteropathogenic E.coli (EPEC),    Enterotoxigenic E. coli (ETEC) lt/st, Shiga-like    toxin-producing E. coli (STEC) stx1/stx2,    Shigella/ Enteroinvasive E. coli (EIEC), Cryptosporidium,    Cyclospora cayetanensis, Entamoeba histolytica,    Giardia lamblia, Adenovirus F 40/41, Astrovirus,    Norovirus GI/GII, Rotavirus A, Sapovirus

## 2019-04-02 NOTE — DISCHARGE NOTE NURSING/CASE MANAGEMENT/SOCIAL WORK - NSDCDPATPORTLINK_GEN_ALL_CORE
You can access the [employer] Patient Portal, offered by [facility], by registering with the following website: [email or web address]

## 2019-04-02 NOTE — DISCHARGE NOTE PROVIDER - PROVIDER TOKENS
PROVIDER:[TOKEN:[[id number]]],PROVIDER:[TOKEN:[[id number]]],PROVIDER:[TOKEN:[[id number]]]

## 2019-04-02 NOTE — PROGRESS NOTE ADULT - ASSESSMENT
This 85 y.o. F   here for falls  recent C diff on  Vancomycin  - clinically w/o diarrhea.     Here for workup of fall  found with   Mobile mass/vegetation  1.38 x 0.7 cm seen on posterior mitral valve leaflet P2 with mild to moderate MR likely consistent with endocarditis.    ESR is elevated at 47  and CRP is 1.27  Cultures sent are negative so far.  LAB will hold for HACEK organisms  - continue Vancomycin and Ceftriaxone  - PICC placement for 4 weeks of IV Ceftriaxone and Vancomycin -     Needs WEEKLY CBC CMP ESR CRP Vancomycin trough faxed to my office - [phone] (fax)  END DATE IS 4/25/19, INCLUSIVELY.     Regarding C diff diarrhea  - suggest at time of discharge to do the following:  Vancomycin 250mg PO Q6H x 14 days, then   Vancomycin 250mg PO Q8H x 7 days, then   Vancomycin 250mg PO Q12H x 7 days, then   Vancomycin 250mg PO Q24H  - until visit with INFECTIOUS DIEASE office.

## 2019-04-02 NOTE — DISCHARGE NOTE PROVIDER - NSDCCPCAREPLAN_GEN_ALL_CORE_FT
PRINCIPAL DISCHARGE DIAGNOSIS  Diagnosis: Suspected endocarditis  Assessment and Plan of Treatment: Continue antibiotcs as reconsiled      SECONDARY DISCHARGE DIAGNOSES  Diagnosis: H/O hyperparathyroidism  Assessment and Plan of Treatment: Oral hydration, BMP check in 1 week, follow up with endocrine as out patient    Diagnosis: Clostridium difficile colitis  Assessment and Plan of Treatment: Continue oral Vancomycine    Diagnosis: Weakness  Assessment and Plan of Treatment: PT

## 2019-04-02 NOTE — DISCHARGE NOTE NURSING/CASE MANAGEMENT/SOCIAL WORK - NSDCFUADDAPPT_GEN_ALL_CORE_FT
Needs WEEKLY CBC CMP ESR CRP Vancomycin trough faxed to my office - [phone] (fax), Dr Sj Sarkar office.   END DATE for IV antibiotics IS 4/25/19, INCLUSIVELY  Regarding C diff diarrhea  Continue with  Vancomycin 250mg PO Q6H x 14 days, then   Vancomycin 250mg PO Q8H x 7 days, then   Vancomycin 250mg PO Q12H x 7 days, then   Vancomycin 250mg PO Q24H  - until visit with INFECTIOUS DIEASE office.   Encourage plenty of water, follow up with Endocrinologist for Hyperparathyroidism.

## 2019-04-02 NOTE — DISCHARGE NOTE PROVIDER - CARE PROVIDER_API CALL
Jennifer Warner)  EndocrinologyMetabDiabetes  [address], [place] [id number]  Phone: [phone]  Fax: [phone]  Follow Up Time:     Sj Rao)  Infectious Disease; Internal Medicine  [address] [facility] [facility] [address]  [place] [address]  Phone: [phone]  Fax: [phone]  Follow Up Time:     Paul Urbina)  Cardiovascular Disease; Critical Care Medicine; Internal Medicine  [address] [place] [address]  Phone: [phone]  Fax: [phone]  Follow Up Time:

## 2019-04-02 NOTE — PROGRESS NOTE ADULT - PROVIDER SPECIALTY LIST ADULT
Cardiology
Electrophysiology
Hospitalist
Infectious Disease
Infectious Disease
Electrophysiology
Hospitalist
Hospitalist

## 2019-04-02 NOTE — DISCHARGE NOTE PROVIDER - NSDCFUADDAPPT_GEN_ALL_CORE_FT
Needs WEEKLY CBC CMP ESR CRP Vancomycin trough faxed to my office - [phone] (fax), Dr Sj Sarkar office.   END DATE IS 4/25/19, INCLUSIVELY. Needs WEEKLY CBC CMP ESR CRP Vancomycin trough faxed to my office - [phone] (fax), Dr Sj Sarkar office.   END DATE for IV antibiotics IS 4/25/19, INCLUSIVELY  Regarding C diff diarrhea  Continue with  Vancomycin 250mg PO Q6H x 14 days, then   Vancomycin 250mg PO Q8H x 7 days, then   Vancomycin 250mg PO Q12H x 7 days, then   Vancomycin 250mg PO Q24H  - until visit with INFECTIOUS DIEASE office.   Encourage plenty of water, follow up with Endocrinologist for Hyperparathyroidism.

## 2019-04-03 LAB
CULTURE RESULTS: SIGNIFICANT CHANGE UP
CULTURE RESULTS: SIGNIFICANT CHANGE UP
SPECIMEN SOURCE: SIGNIFICANT CHANGE UP
SPECIMEN SOURCE: SIGNIFICANT CHANGE UP

## 2019-04-14 ENCOUNTER — INPATIENT (INPATIENT)
Facility: HOSPITAL | Age: 84
LOS: 2 days | Discharge: HOME | DRG: 177 | End: 2019-04-17
Attending: HOSPITALIST
Payer: MEDICARE

## 2019-04-14 VITALS
HEART RATE: 83 BPM | TEMPERATURE: 99 F | RESPIRATION RATE: 20 BRPM | OXYGEN SATURATION: 93 % | DIASTOLIC BLOOD PRESSURE: 76 MMHG | SYSTOLIC BLOOD PRESSURE: 155 MMHG

## 2019-04-14 LAB
APTT BLD: 30.2 SEC — SIGNIFICANT CHANGE UP (ref 27.5–36.3)
BASOPHILS # BLD AUTO: 0 K/UL — SIGNIFICANT CHANGE UP (ref 0–0.2)
BASOPHILS NFR BLD AUTO: 0.3 % — SIGNIFICANT CHANGE UP (ref 0–2)
EOSINOPHIL # BLD AUTO: 0.1 K/UL — SIGNIFICANT CHANGE UP (ref 0–0.5)
EOSINOPHIL NFR BLD AUTO: 1.5 % — SIGNIFICANT CHANGE UP (ref 0–6)
HCT VFR BLD CALC: 33.3 % — LOW (ref 37–47)
HGB BLD-MCNC: 10.9 G/DL — LOW (ref 12–16)
INR BLD: 1.18 RATIO — HIGH (ref 0.88–1.16)
LACTATE BLDV-MCNC: 0.6 MMOL/L — SIGNIFICANT CHANGE UP (ref 0.5–2)
LYMPHOCYTES # BLD AUTO: 0.9 K/UL — LOW (ref 1–4.8)
LYMPHOCYTES # BLD AUTO: 12.8 % — LOW (ref 20–55)
MCHC RBC-ENTMCNC: 30.2 PG — SIGNIFICANT CHANGE UP (ref 27–31)
MCHC RBC-ENTMCNC: 32.7 G/DL — SIGNIFICANT CHANGE UP (ref 32–36)
MCV RBC AUTO: 92.2 FL — SIGNIFICANT CHANGE UP (ref 81–99)
MONOCYTES # BLD AUTO: 0.6 K/UL — SIGNIFICANT CHANGE UP (ref 0–0.8)
MONOCYTES NFR BLD AUTO: 8.6 % — SIGNIFICANT CHANGE UP (ref 3–10)
NEUTROPHILS # BLD AUTO: 5.5 K/UL — SIGNIFICANT CHANGE UP (ref 1.8–8)
NEUTROPHILS NFR BLD AUTO: 76.7 % — HIGH (ref 37–73)
PLATELET # BLD AUTO: 178 K/UL — SIGNIFICANT CHANGE UP (ref 150–400)
PROTHROM AB SERPL-ACNC: 13.6 SEC — HIGH (ref 10–12.9)
RBC # BLD: 3.61 M/UL — LOW (ref 4.4–5.2)
RBC # FLD: 14.2 % — SIGNIFICANT CHANGE UP (ref 11–15.6)
WBC # BLD: 7.2 K/UL — SIGNIFICANT CHANGE UP (ref 4.8–10.8)
WBC # FLD AUTO: 7.2 K/UL — SIGNIFICANT CHANGE UP (ref 4.8–10.8)

## 2019-04-14 PROCEDURE — 71045 X-RAY EXAM CHEST 1 VIEW: CPT | Mod: 26

## 2019-04-14 PROCEDURE — 99285 EMERGENCY DEPT VISIT HI MDM: CPT

## 2019-04-14 PROCEDURE — 93010 ELECTROCARDIOGRAM REPORT: CPT

## 2019-04-14 NOTE — ED PROVIDER NOTE - CLINICAL SUMMARY MEDICAL DECISION MAKING FREE TEXT BOX
Pt with persistent coughing and episodes of reported hypoxia at assisted living, likely secondary to continued PNA, will eval for extent of acute pulmonary disease and reeval

## 2019-04-14 NOTE — ED PROVIDER NOTE - PROGRESS NOTE DETAILS
Labs are as noted. Patient pulse has been stable with increase oxygenation supplementation Labs and CT are as noted. Patient pulse has been stable with increase need of oxygenation supplementation but noted with episodes of hypoxia while at rest: decrease to 89% - 90%. case discussed with nurse manager at rehab, they are unable to place patient on constant watch and monitoring

## 2019-04-14 NOTE — ED ADULT NURSE NOTE - NSIMPLEMENTINTERV_GEN_ALL_ED
Implemented All Fall with Harm Risk Interventions:  [place] to call system. Call bell, personal items and telephone within reach. Instruct patient to call for assistance. Room bathroom lighting operational. Non-slip footwear when patient is off stretcher. Physically safe environment: no spills, clutter or unnecessary equipment. Stretcher in lowest position, wheels locked, appropriate side rails in place. Provide visual cue, wrist band, yellow gown, etc. Monitor gait and stability. Monitor for mental status changes and reorient to person, place, and time. Review medications for side effects contributing to fall risk. Reinforce activity limits and safety measures with patient and family. Provide visual clues: red socks.

## 2019-04-14 NOTE — ED PROVIDER NOTE - MUSCULOSKELETAL, MLM
Spine appears normal, range of motion is not limited, no muscle or joint tenderness. 1+ dependant edema to bilateral lower extremities

## 2019-04-14 NOTE — ED ADULT NURSE NOTE - OBJECTIVE STATEMENT
Pt at [facility] x2 weeks Here 3 weeks ago for C-diff and endocarditis. Pt being treated for PNA at [facility]. PT had 101 temp this week. No nausea vomiting or diarrhea. Pt has several recent falls over last few months. Pt has B/L hand swelling and LLE swelling nonpitting. Fine crackles heard in Left lung. Pt 93% on 2L NC

## 2019-04-14 NOTE — ED ADULT TRIAGE NOTE - CHIEF COMPLAINT QUOTE
patient [facility] from [facility] with low o2 sat, patient on o2 via 2L NC o2 sat of 93%, patient wears o2 at nursing home as per ems, patient denies any complaints of shortness of breath denies any complaints of pain or discomfort to her chest or abdomen patient does not appear to be in any respirstory distress. patient does have some swelling to bilateral hands with redness, PICC line noted to right upper arm, patient was being treated for pneumonia.

## 2019-04-14 NOTE — ED PROVIDER NOTE - OBJECTIVE STATEMENT
84 y/o F, with hx of HTN, hypothyroid, hypercholesterolemia, and recent diagnosis of endocarditis, presents to the ED from [facility] c/o worsening cough and difficulty breathing, onset 4 days ago.  Associated sx include back pain and swelling and pain to bilateral hands (left worse than right).  Family at bedside states that pt's O2 saturation has dropped over the past few days and has been on 2L of O2, however at times has needed 4L of O2.  Notes pt was not on O2 prior to d/c to rehab facility.  Family states that pt was recently admitted to [facility] and diagnosed with endocarditis.  Pt had a PICC line placed for IV abx and [facility] management and care.  Rehab facility staff then diagnosed pt with PNA with a fever of 101.4F, and expressed concern for C-diff.  Fever has since resolved. Family at bedside, states that they requested pt was sent to the ED for further evaluation due to concerns for possible issues with care received at the assisted living.  Pt currently on IV vancomycin, as well as Levaquin (last dose yesterday).  Pt is also receiving albuterol nebulizer treatments.  Denies fever, chills, chest pain, abd pain, N/V/D, back pain, or HA.

## 2019-04-15 DIAGNOSIS — A04.72 ENTEROCOLITIS DUE TO CLOSTRIDIUM DIFFICILE, NOT SPECIFIED AS RECURRENT: ICD-10-CM

## 2019-04-15 DIAGNOSIS — I10 ESSENTIAL (PRIMARY) HYPERTENSION: ICD-10-CM

## 2019-04-15 DIAGNOSIS — J18.9 PNEUMONIA, UNSPECIFIED ORGANISM: ICD-10-CM

## 2019-04-15 DIAGNOSIS — J96.00 ACUTE RESPIRATORY FAILURE, UNSPECIFIED WHETHER WITH HYPOXIA OR HYPERCAPNIA: ICD-10-CM

## 2019-04-15 DIAGNOSIS — E03.9 HYPOTHYROIDISM, UNSPECIFIED: ICD-10-CM

## 2019-04-15 DIAGNOSIS — E78.00 PURE HYPERCHOLESTEROLEMIA, UNSPECIFIED: ICD-10-CM

## 2019-04-15 DIAGNOSIS — I38 ENDOCARDITIS, VALVE UNSPECIFIED: ICD-10-CM

## 2019-04-15 LAB
ALBUMIN SERPL ELPH-MCNC: 3.1 G/DL — LOW (ref 3.3–5.2)
ALP SERPL-CCNC: 82 U/L — SIGNIFICANT CHANGE UP (ref 40–120)
ALT FLD-CCNC: 8 U/L — SIGNIFICANT CHANGE UP
ANION GAP SERPL CALC-SCNC: 11 MMOL/L — SIGNIFICANT CHANGE UP (ref 5–17)
AST SERPL-CCNC: 16 U/L — SIGNIFICANT CHANGE UP
BILIRUB SERPL-MCNC: 0.2 MG/DL — LOW (ref 0.4–2)
BUN SERPL-MCNC: 29 MG/DL — HIGH (ref 8–20)
CALCIUM SERPL-MCNC: 11 MG/DL — HIGH (ref 8.6–10.2)
CHLORIDE SERPL-SCNC: 107 MMOL/L — SIGNIFICANT CHANGE UP (ref 98–107)
CO2 SERPL-SCNC: 21 MMOL/L — LOW (ref 22–29)
CREAT SERPL-MCNC: 0.77 MG/DL — SIGNIFICANT CHANGE UP (ref 0.5–1.3)
GLUCOSE SERPL-MCNC: 115 MG/DL — SIGNIFICANT CHANGE UP (ref 70–115)
POTASSIUM SERPL-MCNC: 3.5 MMOL/L — SIGNIFICANT CHANGE UP (ref 3.5–5.3)
POTASSIUM SERPL-SCNC: 3.5 MMOL/L — SIGNIFICANT CHANGE UP (ref 3.5–5.3)
PROT SERPL-MCNC: 7.1 G/DL — SIGNIFICANT CHANGE UP (ref 6.6–8.7)
RAPID RVP RESULT: SIGNIFICANT CHANGE UP
SODIUM SERPL-SCNC: 139 MMOL/L — SIGNIFICANT CHANGE UP (ref 135–145)
VANCOMYCIN TROUGH SERPL-MCNC: 18.9 UG/ML — SIGNIFICANT CHANGE UP (ref 10–20)

## 2019-04-15 PROCEDURE — 99222 1ST HOSP IP/OBS MODERATE 55: CPT

## 2019-04-15 PROCEDURE — 71250 CT THORAX DX C-: CPT | Mod: 26

## 2019-04-15 PROCEDURE — 99223 1ST HOSP IP/OBS HIGH 75: CPT

## 2019-04-15 RX ORDER — ASPIRIN/CALCIUM CARB/MAGNESIUM 324 MG
325 TABLET ORAL DAILY
Qty: 0 | Refills: 0 | Status: DISCONTINUED | OUTPATIENT
Start: 2019-04-15 | End: 2019-04-17

## 2019-04-15 RX ORDER — LEVOTHYROXINE SODIUM 125 MCG
100 TABLET ORAL DAILY
Qty: 0 | Refills: 0 | Status: DISCONTINUED | OUTPATIENT
Start: 2019-04-15 | End: 2019-04-17

## 2019-04-15 RX ORDER — IPRATROPIUM/ALBUTEROL SULFATE 18-103MCG
3 AEROSOL WITH ADAPTER (GRAM) INHALATION EVERY 6 HOURS
Qty: 0 | Refills: 0 | Status: DISCONTINUED | OUTPATIENT
Start: 2019-04-15 | End: 2019-04-17

## 2019-04-15 RX ORDER — VANCOMYCIN HCL 1 G
250 VIAL (EA) INTRAVENOUS EVERY 8 HOURS
Qty: 0 | Refills: 0 | Status: DISCONTINUED | OUTPATIENT
Start: 2019-04-15 | End: 2019-04-17

## 2019-04-15 RX ORDER — VANCOMYCIN HCL 1 G
750 VIAL (EA) INTRAVENOUS EVERY 12 HOURS
Qty: 0 | Refills: 0 | Status: DISCONTINUED | OUTPATIENT
Start: 2019-04-15 | End: 2019-04-17

## 2019-04-15 RX ORDER — CEFTRIAXONE 500 MG/1
2 INJECTION, POWDER, FOR SOLUTION INTRAMUSCULAR; INTRAVENOUS ONCE
Qty: 0 | Refills: 0 | Status: COMPLETED | OUTPATIENT
Start: 2019-04-15 | End: 2019-04-15

## 2019-04-15 RX ORDER — ACETAMINOPHEN 500 MG
650 TABLET ORAL EVERY 6 HOURS
Qty: 0 | Refills: 0 | Status: DISCONTINUED | OUTPATIENT
Start: 2019-04-15 | End: 2019-04-17

## 2019-04-15 RX ORDER — VANCOMYCIN HCL 1 G
1000 VIAL (EA) INTRAVENOUS EVERY 12 HOURS
Qty: 0 | Refills: 0 | Status: DISCONTINUED | OUTPATIENT
Start: 2019-04-15 | End: 2019-04-15

## 2019-04-15 RX ORDER — ENOXAPARIN SODIUM 100 MG/ML
40 INJECTION SUBCUTANEOUS EVERY 24 HOURS
Qty: 0 | Refills: 0 | Status: DISCONTINUED | OUTPATIENT
Start: 2019-04-15 | End: 2019-04-17

## 2019-04-15 RX ORDER — CEFTRIAXONE 500 MG/1
INJECTION, POWDER, FOR SOLUTION INTRAMUSCULAR; INTRAVENOUS
Qty: 0 | Refills: 0 | Status: DISCONTINUED | OUTPATIENT
Start: 2019-04-15 | End: 2019-04-15

## 2019-04-15 RX ORDER — ATORVASTATIN CALCIUM 80 MG/1
10 TABLET, FILM COATED ORAL AT BEDTIME
Qty: 0 | Refills: 0 | Status: DISCONTINUED | OUTPATIENT
Start: 2019-04-15 | End: 2019-04-17

## 2019-04-15 RX ORDER — PIPERACILLIN AND TAZOBACTAM 4; .5 G/20ML; G/20ML
3.38 INJECTION, POWDER, LYOPHILIZED, FOR SOLUTION INTRAVENOUS EVERY 8 HOURS
Qty: 0 | Refills: 0 | Status: DISCONTINUED | OUTPATIENT
Start: 2019-04-15 | End: 2019-04-17

## 2019-04-15 RX ORDER — AZTREONAM 2 G
2000 VIAL (EA) INJECTION ONCE
Qty: 0 | Refills: 0 | Status: DISCONTINUED | OUTPATIENT
Start: 2019-04-15 | End: 2019-04-15

## 2019-04-15 RX ORDER — AMLODIPINE BESYLATE 2.5 MG/1
5 TABLET ORAL DAILY
Qty: 0 | Refills: 0 | Status: DISCONTINUED | OUTPATIENT
Start: 2019-04-15 | End: 2019-04-17

## 2019-04-15 RX ORDER — MAGNESIUM HYDROXIDE 400 MG/1
30 TABLET, CHEWABLE ORAL DAILY
Qty: 0 | Refills: 0 | Status: DISCONTINUED | OUTPATIENT
Start: 2019-04-15 | End: 2019-04-17

## 2019-04-15 RX ORDER — SACCHAROMYCES BOULARDII 250 MG
250 POWDER IN PACKET (EA) ORAL
Qty: 0 | Refills: 0 | Status: DISCONTINUED | OUTPATIENT
Start: 2019-04-15 | End: 2019-04-17

## 2019-04-15 RX ADMIN — Medication 650 MILLIGRAM(S): at 15:20

## 2019-04-15 RX ADMIN — Medication 325 MILLIGRAM(S): at 11:55

## 2019-04-15 RX ADMIN — PIPERACILLIN AND TAZOBACTAM 25 GRAM(S): 4; .5 INJECTION, POWDER, LYOPHILIZED, FOR SOLUTION INTRAVENOUS at 11:55

## 2019-04-15 RX ADMIN — ENOXAPARIN SODIUM 40 MILLIGRAM(S): 100 INJECTION SUBCUTANEOUS at 11:55

## 2019-04-15 RX ADMIN — Medication 650 MILLIGRAM(S): at 14:18

## 2019-04-15 RX ADMIN — Medication 250 MILLIGRAM(S): at 14:19

## 2019-04-15 RX ADMIN — CEFTRIAXONE 100 GRAM(S): 500 INJECTION, POWDER, FOR SOLUTION INTRAMUSCULAR; INTRAVENOUS at 08:50

## 2019-04-15 RX ADMIN — Medication 250 MILLIGRAM(S): at 19:24

## 2019-04-15 RX ADMIN — Medication 100 MICROGRAM(S): at 08:51

## 2019-04-15 RX ADMIN — Medication 250 MILLIGRAM(S): at 12:11

## 2019-04-15 RX ADMIN — Medication 250 MILLIGRAM(S): at 19:25

## 2019-04-15 RX ADMIN — AMLODIPINE BESYLATE 5 MILLIGRAM(S): 2.5 TABLET ORAL at 08:53

## 2019-04-15 NOTE — PHYSICAL THERAPY INITIAL EVALUATION ADULT - PERTINENT HX OF CURRENT PROBLEM, REHAB EVAL
85 yr old female with hypertension, hypothyroid, hyperlipidemia, endocarditis, C diff admitted with complaints of cough. Pt admitted for PNA

## 2019-04-15 NOTE — H&P ADULT - PROBLEM SELECTOR PROBLEM 4
Verified Results  (1) URINE CULTURE 21Jun2017 10:02AM Esvin Yee   TW Order Number: [id number]     Test Name Result Flag Reference   CLINICAL REPORT (Report)     Test:        Urine culture  Specimen Type:   Urine  Specimen Date:   6/21/2017 10:02 AM  Result Date:    6/23/2017 11:45 AM  Result Status:   Final result  Resulting Lab:   [facility] [place] [address]            Tel: [phone]      CULTURE                                       ------------------                                   >100,000 cfu/ml Escherichia coli    10,000-19,000 cfu/ml Mixed Contaminants X3      SUSCEPTIBILITY                                   ------------------                                                       Escherichia coli  METHOD                 KATERYNA  -------------------------------------  -------------------------  AMPICILLIN ($$)             >16 00 ug/ml Resistant  AMPICILLIN + SULBACTAM ($)       >16/8 ug/ml  Resistant  AZTREONAM ($$$)             <=8 ug/ml   Susceptible  CEFAZOLIN ($)              <=8 00 ug/ml Susceptible  CIPROFLOXACIN ($)            <=1 00 ug/ml Susceptible  GENTAMICIN ($$)             <=4 ug/ml   Susceptible  LEVOFLOXACIN ($)            <=2 00 ug/ml Susceptible  NITROFURANTOIN             <=32 ug/ml  Susceptible  PIPERACILLIN + TAZOBACTAM ($$$)     <=16 ug/ml  Susceptible  TETRACYCLINE              <=4 ug/ml   Susceptible  TOBRAMYCIN ($)             <=4 ug/ml   Susceptible  TRIMETHOPRIM + SULFAMETHOXAZOLE ($$$)  <=2/38 ug/ml Susceptible
Clostridium difficile colitis

## 2019-04-15 NOTE — PHYSICAL THERAPY INITIAL EVALUATION ADULT - IMPAIRMENTS FOUND, PT EVAL
gait, locomotion, and balance/muscle strength/posture/ventilation and respiration/gas exchange/aerobic capacity/endurance

## 2019-04-15 NOTE — ED ADULT NURSE REASSESSMENT NOTE - NS ED NURSE REASSESS COMMENT FT1
Hospitalist at bedside. Pt resting in bed, alert and oriented. Lungs diminished at bases, +productive cough. No c/o pain or discomfort at this time. Family at bedside. Pt awaiting bed.

## 2019-04-15 NOTE — CONSULT NOTE ADULT - SUBJECTIVE AND OBJECTIVE BOX
[facility]  INFECTIOUS DISEASES AND INTERNAL MEDICINE at [place]  =======================================================  Joe Castillo MD  Diplomates American Board of Internal Medicine and Infectious Diseases  =======================================================    [id number]  YADIRA LEVI   This is 84 yo woman with recurrent falls, s/p loop recorder in 2018, Recent C diff positive 3/21/19, started on po vanco, readmitted on  for weakness. urine c/s sent during last admission growing E coli, pt denied urinary complaints, afebrile, normal WBC, repeat u/a without pyuria and given 1 dose of Levaquin on 3/26/19 then stopped.   TTE showed LVEF >75%, grade 3 diastolic dysfunction, Mobile echodensity visualized measuring 1.5 x .7 x 1.0 cm. noted on  posterior mitral leaflet on atrial side.  She had MARYCHUY on 3/29/19 which confirmed vegetation, and cultures had been negative.  She was discharged to nursing home on 19.    per daughter, she was doing well at first, then about 1 week ago, she started having cough,. had CXR which showed Pneumonia and she was given 3 days of Levaquin They were told later that the patient did not have pneumonia and Levaquin stopped.  She presented to the ER for  short of breath, with low oxygen levels, requiring supplemental oxygen levels. Per admission note, she Febrile 101 at rehab a few days ago.     CT chest showed:  Findings may represent multifocal pneumonia, worse in the right lung,   versus edema. Small bilateral pleural effusions. Mild mediastinal   adenopathy is probably reactive.      =======================================================  Past Medical & Surgical Hx:  =====================  PAST MEDICAL & SURGICAL HISTORY:  Endocarditis  Near syncope  Hypertension  Hypercholesterolemia  Hypothyroid  No significant past surgical history      Problem List:  ==========  HEALTH ISSUES - PROBLEM Dx:  Hypertension: Hypertension  Hypothyroid: Hypothyroid  Hypercholesterolemia: Hypercholesterolemia  Clostridium difficile colitis: Clostridium difficile colitis  Endocarditis: Endocarditis  Respiratory failure, acute: Respiratory failure, acute  Pneumonia: Pneumonia    Social Hx:  =======  no toxic habits currently    FAMILY HISTORY:  FH: stroke: her mother  at age 70&#x27;s  FH: heart disease: father  when she was  9 , does not remember his exact age when   no significant family history of immunosuppressive disorders in mother or father   =======================================================  REVIEW OF SYSTEMS:  as above  all other ROS negative  =======================================================  Allergies  penicillins (Rash)      Antibiotics:  piperacillin/tazobactam IVPB. 3.375 Gram(s) IV Intermittent every 8 hours  vancomycin    Solution 250 milliGRAM(s) Oral every 8 hours  vancomycin  IVPB 750 milliGRAM(s) IV Intermittent every 12 hours    Other medications:  amLODIPine   Tablet 5 milliGRAM(s) Oral daily  aspirin enteric coated 325 milliGRAM(s) Oral daily  atorvastatin 10 milliGRAM(s) Oral at bedtime  enoxaparin Injectable 40 milliGRAM(s) SubCutaneous every 24 hours  levothyroxine 100 MICROGram(s) Oral daily  saccharomyces boulardii 250 milliGRAM(s) Oral two times a day       cefTRIAXone   IVPB   100 mL/Hr IV Intermittent (04-15-19 @ 08:50)    100 mL/Hr IV Intermittent (19 @ 17:54)    100 mL/Hr IV Intermittent (19 @ 17:09)   100 mL/Hr IV Intermittent (19 @ 16:39)   100 mL/Hr IV Intermittent (19 @ 17:54)    vancomycin  IVPB   250 mL/Hr IV Intermittent (19 @ 18:36)   250 mL/Hr IV Intermittent (19 @ 11:02)   250 mL/Hr IV Intermittent (19 @ 19:02)   250 mL/Hr IV Intermittent (19 @ 09:19)   250 mL/Hr IV Intermittent (19 @ 17:44)   250 mL/Hr IV Intermittent (19 @ 05:36)   250 mL/Hr IV Intermittent (19 @ 17:54)    levoFLOXacin IVPB   100 mL/Hr IV Intermittent (19 @ 02:22)    piperacillin/tazobactam IVPB.   25 mL/Hr IV Intermittent (04-15-19 @ 11:55)    vancomycin    Solution   250 milliGRAM(s) Oral (19 @ 18:27)   250 milliGRAM(s) Oral (19 @ 00:32)   250 milliGRAM(s) Oral (19 @ 05:17)   250 milliGRAM(s) Oral (19 @ 13:41)   250 milliGRAM(s) Oral (19 @ 17:45)   250 milliGRAM(s) Oral (19 @ 23:04)   250 milliGRAM(s) Oral (19 @ 05:58)   250 milliGRAM(s) Oral (19 @ 13:42)   250 milliGRAM(s) Oral (19 @ 19:07)   250 milliGRAM(s) Oral (19 @ 23:06)   250 milliGRAM(s) Oral (19 @ 06:56)   250 milliGRAM(s) Oral (19 @ 12:26)   250 milliGRAM(s) Oral (19 @ 18:15)   250 milliGRAM(s) Oral (19 @ 23:07)   250 milliGRAM(s) Oral (19 @ 05:22)   250 milliGRAM(s) Oral (19 @ 22:34)   250 milliGRAM(s) Oral (19 @ 06:03)   250 milliGRAM(s) Oral (19 @ 11:43)   250 milliGRAM(s) Oral (19 @ 17:06)   250 milliGRAM(s) Oral (19 @ 00:28)   250 milliGRAM(s) Oral (19 @ 05:26)   250 milliGRAM(s) Oral (19 @ 11:01)   250 milliGRAM(s) Oral (19 @ 17:09)   250 milliGRAM(s) Oral (19 @ 23:23)   250 milliGRAM(s) Oral (19 @ 05:17)   250 milliGRAM(s) Oral (19 @ 14:07)   250 milliGRAM(s) Oral (19 @ 17:35)   250 milliGRAM(s) Oral (19 @ 23:14)   250 milliGRAM(s) Oral (19 @ 06:47)   250 milliGRAM(s) Oral (19 @ 12:10)   250 milliGRAM(s) Oral (19 @ 23:28)   250 milliGRAM(s) Oral (19 @ 01:48)   250 milliGRAM(s) Oral (19 @ 07:02)   250 milliGRAM(s) Oral (19 @ 12:48)   250 milliGRAM(s) Oral (19 @ 17:09)   250 milliGRAM(s) Oral (19 @ 23:08)   250 milliGRAM(s) Oral (19 @ 05:14)   250 milliGRAM(s) Oral (19 @ 12:01)   250 milliGRAM(s) Oral (19 @ 17:44)   250 milliGRAM(s) Oral (19 @ 00:29)   250 milliGRAM(s) Oral (19 @ 05:36)   250 milliGRAM(s) Oral (19 @ 11:32)   250 milliGRAM(s) Oral (19 @ 17:59)   250 milliGRAM(s) Oral (19 @ 00:34)   250 milliGRAM(s) Oral (19 @ 05:35)   250 milliGRAM(s) Oral (19 @ 10:55)        ======================================================  Physical Exam:  ============  T(F): 99.1 (15 Apr 2019 11:32), Max: 99.4 (15 Apr 2019 07:46)  HR: 83 (15 Apr 2019 11:32)  BP: 144/75 (15 Apr 2019 11:32)  RR: 18 (15 Apr 2019 11:32)  SpO2: 98% (15 Apr 2019 11:32) (93% - 98%)  Height (cm): 160 (04-15-19 @ 08:35)  Weight (kg): 54.431 (04-15-19 @ 08:35)  BMI (kg/m2): 21.3 (04-15-19 @ 08:35)  BSA (m2): 1.56 (04-15-19 @ 08:35)    General:  No acute distress.  THIN FRAIL  Eye: Pupils are equal, round and reactive to light, Extraocular movements are intact, Normal conjunctiva. NO CONJUNCTIVAL HEMORRHAGES  HENT: Normocephalic, Oral mucosa is moist, edentulous  Neck: Supple, No lymphadenopathy.  Respiratory: Lungs are clear to auscultation ANTERIORLY  Cardiovascular: Normal rate, Regular rhythm, + MURMUR  IMPLANTED CARDIAC DEVICE IN LEFT CHEST LATERAL TO THE STERNUM  Gastrointestinal: Soft, Non-tender, Non-distended, Normal bowel sounds.  Genitourinary: No costovertebral angle tenderness.  Lymphatics: No lymphadenopathy neck,   Musculoskeletal: Normal range of motion, Normal strength.  Integumentary: No rash.  Neurologic: Alert, Oriented, No focal deficits, Cranial Nerves II-XII are grossly intact.  Psychiatric: Appropriate mood & affect.    =======================================================  Labs:                        10.9   7.2   )-----------( 178      ( 2019 23:44 )             33.3       WBC Count: 7.2 K/uL (19 @ 23:44)          139  |  107  |  29.0<H>  ----------------------------<  115  3.5   |  21.0<L>  |  0.77    Ca    11.0<H>      2019 23:44    TPro  7.1  /  Alb  3.1<L>  /  TBili  0.2<L>  /  DBili  x   /  AST  16  /  ALT  8   /  AlkPhos  82          ========  < from: CT Chest No Cont (04.15.19 @ 05:12) >   EXAM:  CT CHEST                          PROCEDURE DATE:  04/15/2019          INTERPRETATION:  CT CHEST WITHOUT CONTRAST    HISTORY: worsening cough.    COMPARISON: CT chest 2018.    TECHNIQUE: CT scan of the chest was performed without intravenous   contrast.    FINDINGS:  No enlarged axillary adenopathy. Mild mediastinal adenopathy with a few   mildly enlarged lymph nodes, including a 1.1 cm in short axis right   paratracheal and 1.5 cm in short axis precarinal lymph nodes. Evaluation   for hilar adenopathy is limited without contrast.    Mild cardiomegaly. Trace pericardial effusion. Coronary artery, mitral   and aortic annular calcifications. Partially visualized right PICC   terminates in the SVC.    Small bilateral pleural effusions, right slightly larger than left, with   adjacent partial atelectasis/consolidations of both lower lobes;   correlate for superimposed pneumonia. There are also patchy mix   groundglass and dense or consolidative opacities in the right upper and  right middle lobes. More mild groundglass opacities are noted in the left   upper lobe.    No pneumothorax. Trachea and central airways are patent.    There is an small air-fluid level in the upper esophagus, consistent with   reflux. Small hiatal hernia.    Limited noncontrast images of the visualized upper abdomen demonstrate a   right parapelvic renal cyst versus mild right hydronephrosis.    Bones are osteopenic with degenerative changes in the spine. Small   sclerosis of the left 5th and 6th ribs likely healed old buckle fractures.    IMPRESSION:  Findings may represent multifocal pneumonia, worse in the right lung,   versus edema. Small bilateral pleural effusions. Mild mediastinal   adenopathy is probably reactive.                JOSSUE MESA., ATTENDING RADIOLOGIST  This document has been electronically signed. Apr 15 2019  5:33AM        < end of copied text >

## 2019-04-15 NOTE — PHYSICAL THERAPY INITIAL EVALUATION ADULT - CRITERIA FOR SKILLED THERAPEUTIC INTERVENTIONS
impairments found/rehab potential/anticipated equipment needs at discharge/anticipated discharge recommendation/KATE

## 2019-04-15 NOTE — H&P ADULT - ASSESSMENT
85 yr old female with hypertension, hypothyroid, hyperlipidemia, endocarditis, C diff admitted with complaints of cough. She was sent in for evaluation given hypoxia and fever. CT chest done in ED, showed multifocal pneumonia. Labs, imaging and EKG noted.

## 2019-04-15 NOTE — PHYSICAL THERAPY INITIAL EVALUATION ADULT - ADDITIONAL COMMENTS
Pt lives in a private home with her daughter. 3 steps to enter with handrails, no steps inside. Pt was independent PTA with RW however, has been declining per family. Pt owns RW.

## 2019-04-15 NOTE — H&P ADULT - HISTORY OF PRESENT ILLNESS
85 yr old female with hypertension, hypothyroid, hyperlipidemia, endocarditis, C diff admitted with complaints of cough. Son at bedside, states patient developed a cough and fever at rehab. She was also noted to be short of breath, with low oxygen levels, requiring supplemental oxygen levels. Febrile 101 at rehab a few days ago. Cough not productive. No diarrhea, on Vancomycin for C diff. On antibiotics for endocarditis. Normal appetite, no bowel/bladder complaints.   She was given Levofloxacin in addition to Vancomycin and Ceftriaxone at rehab.     PMD: [facility].

## 2019-04-15 NOTE — H&P ADULT - NSICDXPASTMEDICALHX_GEN_ALL_CORE_FT
PAST MEDICAL HISTORY:  Endocarditis     Hypercholesterolemia     Hypertension     Hypothyroid     Near syncope

## 2019-04-15 NOTE — H&P ADULT - PROBLEM SELECTOR PLAN 1
Check cultures, supplemental oxygen. Continue Vancomycin and Ceftriaxone. Vancomycin dose increased to 1 g BID. ID evaluation.

## 2019-04-15 NOTE — PATIENT PROFILE ADULT - NSPROPTRIGHTNOTIFYOBTAINDET_GEN_A_NUR
Phong Cindy will provide [facility] with the contact information for Cardiologist Sj Canales.

## 2019-04-15 NOTE — CONSULT NOTE ADULT - ASSESSMENT
This 85 y.o. F   recently seen in hospital for culture negative endocarditis  on PO Vancomycin for prior hx of C diff    here for PNA - bilateral, right vs left.    Stop Ceftriaxone  - start Zosyn  - Continue vancomycin IV    - will Continue Vancomycin PO for C diff.       - follow up all outstanding cultures  - trend temperature and WBC curve  - repeat cultures from blood and all sources if febrile.

## 2019-04-15 NOTE — PHYSICAL THERAPY INITIAL EVALUATION ADULT - IMPAIRMENTS CONTRIBUTING TO GAIT DEVIATIONS, PT EVAL
decreased activity tolerance/+ SHORE/impaired postural control/decreased strength/pain/impaired balance

## 2019-04-16 LAB
ANION GAP SERPL CALC-SCNC: 11 MMOL/L — SIGNIFICANT CHANGE UP (ref 5–17)
BASOPHILS # BLD AUTO: 0 K/UL — SIGNIFICANT CHANGE UP (ref 0–0.2)
BASOPHILS NFR BLD AUTO: 0.5 % — SIGNIFICANT CHANGE UP (ref 0–2)
BUN SERPL-MCNC: 22 MG/DL — HIGH (ref 8–20)
CALCIUM SERPL-MCNC: 11 MG/DL — HIGH (ref 8.6–10.2)
CHLORIDE SERPL-SCNC: 105 MMOL/L — SIGNIFICANT CHANGE UP (ref 98–107)
CO2 SERPL-SCNC: 22 MMOL/L — SIGNIFICANT CHANGE UP (ref 22–29)
CREAT SERPL-MCNC: 0.87 MG/DL — SIGNIFICANT CHANGE UP (ref 0.5–1.3)
EOSINOPHIL # BLD AUTO: 0.2 K/UL — SIGNIFICANT CHANGE UP (ref 0–0.5)
EOSINOPHIL NFR BLD AUTO: 3.2 % — SIGNIFICANT CHANGE UP (ref 0–6)
GLUCOSE SERPL-MCNC: 120 MG/DL — HIGH (ref 70–115)
HCT VFR BLD CALC: 32.5 % — LOW (ref 37–47)
HGB BLD-MCNC: 10.7 G/DL — LOW (ref 12–16)
INR BLD: 1.07 RATIO — SIGNIFICANT CHANGE UP (ref 0.88–1.16)
LYMPHOCYTES # BLD AUTO: 1.1 K/UL — SIGNIFICANT CHANGE UP (ref 1–4.8)
LYMPHOCYTES # BLD AUTO: 13.9 % — LOW (ref 20–55)
MAGNESIUM SERPL-MCNC: 1.9 MG/DL — SIGNIFICANT CHANGE UP (ref 1.6–2.6)
MCHC RBC-ENTMCNC: 30.6 PG — SIGNIFICANT CHANGE UP (ref 27–31)
MCHC RBC-ENTMCNC: 32.9 G/DL — SIGNIFICANT CHANGE UP (ref 32–36)
MCV RBC AUTO: 92.9 FL — SIGNIFICANT CHANGE UP (ref 81–99)
MONOCYTES # BLD AUTO: 0.7 K/UL — SIGNIFICANT CHANGE UP (ref 0–0.8)
MONOCYTES NFR BLD AUTO: 9.2 % — SIGNIFICANT CHANGE UP (ref 3–10)
NEUTROPHILS # BLD AUTO: 5.6 K/UL — SIGNIFICANT CHANGE UP (ref 1.8–8)
NEUTROPHILS NFR BLD AUTO: 72.9 % — SIGNIFICANT CHANGE UP (ref 37–73)
PHOSPHATE SERPL-MCNC: 1.9 MG/DL — LOW (ref 2.4–4.7)
PLATELET # BLD AUTO: 200 K/UL — SIGNIFICANT CHANGE UP (ref 150–400)
POTASSIUM SERPL-MCNC: 3.2 MMOL/L — LOW (ref 3.5–5.3)
POTASSIUM SERPL-SCNC: 3.2 MMOL/L — LOW (ref 3.5–5.3)
PROTHROM AB SERPL-ACNC: 12.3 SEC — SIGNIFICANT CHANGE UP (ref 10–12.9)
RBC # BLD: 3.5 M/UL — LOW (ref 4.4–5.2)
RBC # FLD: 14.4 % — SIGNIFICANT CHANGE UP (ref 11–15.6)
SODIUM SERPL-SCNC: 138 MMOL/L — SIGNIFICANT CHANGE UP (ref 135–145)
WBC # BLD: 7.6 K/UL — SIGNIFICANT CHANGE UP (ref 4.8–10.8)
WBC # FLD AUTO: 7.6 K/UL — SIGNIFICANT CHANGE UP (ref 4.8–10.8)

## 2019-04-16 PROCEDURE — 99232 SBSQ HOSP IP/OBS MODERATE 35: CPT

## 2019-04-16 RX ORDER — CHLORHEXIDINE GLUCONATE 213 G/1000ML
1 SOLUTION TOPICAL DAILY
Qty: 0 | Refills: 0 | Status: DISCONTINUED | OUTPATIENT
Start: 2019-04-16 | End: 2019-04-17

## 2019-04-16 RX ORDER — POTASSIUM CHLORIDE 20 MEQ
40 PACKET (EA) ORAL EVERY 4 HOURS
Qty: 0 | Refills: 0 | Status: COMPLETED | OUTPATIENT
Start: 2019-04-16 | End: 2019-04-16

## 2019-04-16 RX ADMIN — Medication 3 MILLILITER(S): at 00:40

## 2019-04-16 RX ADMIN — Medication 250 MILLIGRAM(S): at 12:28

## 2019-04-16 RX ADMIN — CHLORHEXIDINE GLUCONATE 1 APPLICATION(S): 213 SOLUTION TOPICAL at 12:29

## 2019-04-16 RX ADMIN — Medication 250 MILLIGRAM(S): at 06:32

## 2019-04-16 RX ADMIN — Medication 250 MILLIGRAM(S): at 21:04

## 2019-04-16 RX ADMIN — PIPERACILLIN AND TAZOBACTAM 25 GRAM(S): 4; .5 INJECTION, POWDER, LYOPHILIZED, FOR SOLUTION INTRAVENOUS at 06:32

## 2019-04-16 RX ADMIN — Medication 250 MILLIGRAM(S): at 19:15

## 2019-04-16 RX ADMIN — Medication 325 MILLIGRAM(S): at 12:28

## 2019-04-16 RX ADMIN — Medication 250 MILLIGRAM(S): at 05:17

## 2019-04-16 RX ADMIN — Medication 250 MILLIGRAM(S): at 00:42

## 2019-04-16 RX ADMIN — Medication 40 MILLIEQUIVALENT(S): at 12:28

## 2019-04-16 RX ADMIN — AMLODIPINE BESYLATE 5 MILLIGRAM(S): 2.5 TABLET ORAL at 05:17

## 2019-04-16 RX ADMIN — ATORVASTATIN CALCIUM 10 MILLIGRAM(S): 80 TABLET, FILM COATED ORAL at 21:04

## 2019-04-16 RX ADMIN — Medication 100 MICROGRAM(S): at 06:33

## 2019-04-16 RX ADMIN — PIPERACILLIN AND TAZOBACTAM 25 GRAM(S): 4; .5 INJECTION, POWDER, LYOPHILIZED, FOR SOLUTION INTRAVENOUS at 00:16

## 2019-04-16 RX ADMIN — ATORVASTATIN CALCIUM 10 MILLIGRAM(S): 80 TABLET, FILM COATED ORAL at 00:15

## 2019-04-16 RX ADMIN — ENOXAPARIN SODIUM 40 MILLIGRAM(S): 100 INJECTION SUBCUTANEOUS at 12:28

## 2019-04-16 RX ADMIN — PIPERACILLIN AND TAZOBACTAM 25 GRAM(S): 4; .5 INJECTION, POWDER, LYOPHILIZED, FOR SOLUTION INTRAVENOUS at 21:04

## 2019-04-16 RX ADMIN — Medication 650 MILLIGRAM(S): at 19:16

## 2019-04-16 RX ADMIN — Medication 40 MILLIEQUIVALENT(S): at 19:15

## 2019-04-16 RX ADMIN — PIPERACILLIN AND TAZOBACTAM 25 GRAM(S): 4; .5 INJECTION, POWDER, LYOPHILIZED, FOR SOLUTION INTRAVENOUS at 12:28

## 2019-04-16 NOTE — PROGRESS NOTE ADULT - ASSESSMENT
This 85 y.o. F   recently seen in hospital for culture negative endocarditis  on PO Vancomycin for prior hx of C diff    here for PNA - bilateral, right vs left.    - continue Zosyn  - anticipate a 7 to 10 day course.   - Continue vancomycin IV  - sent sputum cx if possible.       - will Continue Vancomycin PO for C diff.       - follow up all outstanding cultures  - trend temperature and WBC curve  - repeat cultures from blood and all sources if febrile.

## 2019-04-16 NOTE — PROGRESS NOTE ADULT - SUBJECTIVE AND OBJECTIVE BOX
YADIRA AMITA  ----------------------------------------  The patient was seen and evaluated for pneumonia.  The patient is in no acute distress.  Denied any chest pain, palpitations, dyspnea, or abdominal pain.  Reports a non-productive cough.    Vital Signs Last 24 Hrs  T(C): 36.6 (16 Apr 2019 07:49), Max: 36.9 (16 Apr 2019 00:31)  T(F): 97.8 (16 Apr 2019 07:49), Max: 98.5 (16 Apr 2019 00:31)  HR: 72 (16 Apr 2019 07:49) (66 - 74)  BP: 135/78 (16 Apr 2019 07:49) (134/74 - 155/78)  BP(mean): --  RR: 18 (16 Apr 2019 07:49) (18 - 18)  SpO2: 92% (16 Apr 2019 07:49) (87% - 96%)    PHYSICAL EXAMINATION:  ----------------------------------------  General appearance: No acute distress, Awake, Alert  HEENT: Normocephalic, Atraumatic, Conjunctiva clear, EOMI  Neck: Supple, No JVD, No tenderness  Lungs: Breath sound equal bilaterally, No wheezes, Mild rales  Cardiovascular: S1S2, Regular rhythm  Abdomen: Soft, Nontender, Nondistended, No guarding/rebound, Positive bowel sounds  Extremities: No clubbing, No cyanosis, No calf tenderness, Left upper extremity edema  Neuro: Strength equal bilaterally, No tremors  Psychiatric: Appropriate mood, Normal affect    LABORATORY STUDIES:  ----------------------------------------             10.7   7.6   )-----------( 200      ( 16 Apr 2019 07:23 )             32.5     04-16    138  |  105  |  22.0<H>  ----------------------------<  120<H>  3.2<L>   |  22.0  |  0.87    Ca    11.0<H>      16 Apr 2019 07:23  Phos  1.9     04-16  Mg     1.9     04-16    TPro  7.1  /  Alb  3.1<L>  /  TBili  0.2<L>  /  DBili  x   /  AST  16  /  ALT  8   /  AlkPhos  82  04-14    LIVER FUNCTIONS - ( 14 Apr 2019 23:44 )  Alb: 3.1 g/dL / Pro: 7.1 g/dL / ALK PHOS: 82 U/L / ALT: 8 U/L / AST: 16 U/L / GGT: x           PT/INR - ( 16 Apr 2019 07:23 )   PT: 12.3 sec;   INR: 1.07 ratio    PTT - ( 14 Apr 2019 23:44 )  PTT:30.2 sec    MEDICATIONS  (STANDING):  amLODIPine   Tablet 5 milliGRAM(s) Oral daily  aspirin enteric coated 325 milliGRAM(s) Oral daily  atorvastatin 10 milliGRAM(s) Oral at bedtime  chlorhexidine 2% Cloths 1 Application(s) Topical daily  enoxaparin Injectable 40 milliGRAM(s) SubCutaneous every 24 hours  levothyroxine 100 MICROGram(s) Oral daily  piperacillin/tazobactam IVPB. 3.375 Gram(s) IV Intermittent every 8 hours  potassium chloride    Tablet ER 40 milliEquivalent(s) Oral every 4 hours  saccharomyces boulardii 250 milliGRAM(s) Oral two times a day  vancomycin    Solution 250 milliGRAM(s) Oral every 8 hours  vancomycin  IVPB 750 milliGRAM(s) IV Intermittent every 12 hours    MEDICATIONS  (PRN):  acetaminophen   Tablet .. 650 milliGRAM(s) Oral every 6 hours PRN Temp greater or equal to 38C (100.4F), Mild Pain (1 - 3)  ALBUTerol/ipratropium for Nebulization 3 milliLiter(s) Nebulizer every 6 hours PRN Shortness of Breath and/or Wheezing  bisacodyl 5 milliGRAM(s) Oral every 12 hours PRN Constipation  magnesium hydroxide Suspension 30 milliLiter(s) Oral daily PRN Constipation      ASSESSMENT / PLAN:  ----------------------------------------  85F with a prior admission for endocarditis and clostridium difficile colitis presented from the assisted living facility for pneumonia and hypoxia.    Respiratory failure / Pneumonia - On piperacillin/tazobactam. Afebrile and without leukocytosis. Continues to require supplemental oxygen for hypoxia. To use the pre-existing PICC and remove the saline lock from the left hand due to swelling.    Endocarditis - On intravenous antibiotics. Infectious Disease consultation noted.    Clostridium difficile colitis - On vancomycin. Abdominal examination benign. No diarrhea reported.    Hypothyroidism - On levothyroxine.    Hypertension - Close blood pressure monitoring.    Discussed with the patient and her son at the bedside. YADIRA AMITA  ----------------------------------------  The patient was seen and evaluated for pneumonia.  The patient is in no acute distress.  Denied any chest pain, palpitations, dyspnea, or abdominal pain.  Reports a non-productive cough.    Vital Signs Last 24 Hrs  T(C): 36.6 (16 Apr 2019 07:49), Max: 36.9 (16 Apr 2019 00:31)  T(F): 97.8 (16 Apr 2019 07:49), Max: 98.5 (16 Apr 2019 00:31)  HR: 72 (16 Apr 2019 07:49) (66 - 74)  BP: 135/78 (16 Apr 2019 07:49) (134/74 - 155/78)  BP(mean): --  RR: 18 (16 Apr 2019 07:49) (18 - 18)  SpO2: 92% (16 Apr 2019 07:49) (87% - 96%)    PHYSICAL EXAMINATION:  ----------------------------------------  General appearance: No acute distress, Awake, Alert  HEENT: Normocephalic, Atraumatic, Conjunctiva clear, EOMI  Neck: Supple, No JVD, No tenderness  Lungs: Breath sound equal bilaterally, No wheezes, Mild rales  Cardiovascular: S1S2, Regular rhythm  Abdomen: Soft, Nontender, Nondistended, No guarding/rebound, Positive bowel sounds  Extremities: No clubbing, No cyanosis, No calf tenderness, Left upper extremity edema  Neuro: Strength equal bilaterally, No tremors  Psychiatric: Appropriate mood, Normal affect    LABORATORY STUDIES:  ----------------------------------------             10.7   7.6   )-----------( 200      ( 16 Apr 2019 07:23 )             32.5     04-16    138  |  105  |  22.0<H>  ----------------------------<  120<H>  3.2<L>   |  22.0  |  0.87    Ca    11.0<H>      16 Apr 2019 07:23  Phos  1.9     04-16  Mg     1.9     04-16    TPro  7.1  /  Alb  3.1<L>  /  TBili  0.2<L>  /  DBili  x   /  AST  16  /  ALT  8   /  AlkPhos  82  04-14    LIVER FUNCTIONS - ( 14 Apr 2019 23:44 )  Alb: 3.1 g/dL / Pro: 7.1 g/dL / ALK PHOS: 82 U/L / ALT: 8 U/L / AST: 16 U/L / GGT: x           PT/INR - ( 16 Apr 2019 07:23 )   PT: 12.3 sec;   INR: 1.07 ratio    PTT - ( 14 Apr 2019 23:44 )  PTT:30.2 sec    MEDICATIONS  (STANDING):  amLODIPine   Tablet 5 milliGRAM(s) Oral daily  aspirin enteric coated 325 milliGRAM(s) Oral daily  atorvastatin 10 milliGRAM(s) Oral at bedtime  chlorhexidine 2% Cloths 1 Application(s) Topical daily  enoxaparin Injectable 40 milliGRAM(s) SubCutaneous every 24 hours  levothyroxine 100 MICROGram(s) Oral daily  piperacillin/tazobactam IVPB. 3.375 Gram(s) IV Intermittent every 8 hours  potassium chloride    Tablet ER 40 milliEquivalent(s) Oral every 4 hours  saccharomyces boulardii 250 milliGRAM(s) Oral two times a day  vancomycin    Solution 250 milliGRAM(s) Oral every 8 hours  vancomycin  IVPB 750 milliGRAM(s) IV Intermittent every 12 hours    MEDICATIONS  (PRN):  acetaminophen   Tablet .. 650 milliGRAM(s) Oral every 6 hours PRN Temp greater or equal to 38C (100.4F), Mild Pain (1 - 3)  ALBUTerol/ipratropium for Nebulization 3 milliLiter(s) Nebulizer every 6 hours PRN Shortness of Breath and/or Wheezing  bisacodyl 5 milliGRAM(s) Oral every 12 hours PRN Constipation  magnesium hydroxide Suspension 30 milliLiter(s) Oral daily PRN Constipation      ASSESSMENT / PLAN:  ----------------------------------------  85F with a prior admission for endocarditis and clostridium difficile colitis presented from the assisted living facility for pneumonia and hypoxia.    Respiratory failure / Pneumonia - On piperacillin/tazobactam for suspected gram negative bacteria. Afebrile and without leukocytosis. Continues to require supplemental oxygen for hypoxia. To use the pre-existing PICC and remove the saline lock from the left hand due to swelling.    Endocarditis - On intravenous antibiotics. Infectious Disease consultation noted.    Clostridium difficile colitis - On vancomycin. Abdominal examination benign. No diarrhea reported.    Hypothyroidism - On levothyroxine.    Hypertension - Close blood pressure monitoring.    Discussed with the patient and her son at the bedside.

## 2019-04-16 NOTE — PROGRESS NOTE ADULT - SUBJECTIVE AND OBJECTIVE BOX
[facility]  INFECTIOUS DISEASES AND INTERNAL MEDICINE at [place]  =======================================================  Joe Castillo MD  Diplomates American Board of Internal Medicine and Infectious Diseases  =======================================================    [id number]  YADIRA AMITA   follow up for:  bilateral pneumonia  patient seen and examined.     no fevers  up in chair today    ===================================================  REVIEW OF SYSTEMS:  as above  all other ROS negative    =======================================================  Allergies  penicillins (Rash)      Antibiotics:  piperacillin/tazobactam IVPB. 3.375 Gram(s) IV Intermittent every 8 hours  vancomycin    Solution 250 milliGRAM(s) Oral every 8 hours  vancomycin  IVPB 750 milliGRAM(s) IV Intermittent every 12 hours    Other medications:  amLODIPine   Tablet 5 milliGRAM(s) Oral daily  aspirin enteric coated 325 milliGRAM(s) Oral daily  atorvastatin 10 milliGRAM(s) Oral at bedtime  chlorhexidine 2% Cloths 1 Application(s) Topical daily  enoxaparin Injectable 40 milliGRAM(s) SubCutaneous every 24 hours  levothyroxine 100 MICROGram(s) Oral daily  potassium chloride    Tablet ER 40 milliEquivalent(s) Oral every 4 hours  saccharomyces boulardii 250 milliGRAM(s) Oral two times a day        ======================================================  Physical Exam:  ============  T(F): 98.5 (16 Apr 2019 15:00), Max: 99.4 (15 Apr 2019 07:46)  HR: 79 (16 Apr 2019 15:00)  BP: 137/66 (16 Apr 2019 15:00)  RR: 18 (16 Apr 2019 15:00)  SpO2: 92% (16 Apr 2019 15:00) (87% - 98%)    General:  No acute distress.  THIN FRAIL  Eye: Pupils are equal, round and reactive to light, Extraocular movements are intact, Normal conjunctiva. NO CONJUNCTIVAL HEMORRHAGES  HENT: Normocephalic, Oral mucosa is moist, edentulous  Neck: Supple, No lymphadenopathy.  Respiratory: Lungs are clear to auscultation ANTERIORLY  Cardiovascular: Normal rate, Regular rhythm, + MURMUR  IMPLANTED CARDIAC DEVICE IN LEFT CHEST LATERAL TO THE STERNUM  Gastrointestinal: Soft, Non-tender, Non-distended, Normal bowel sounds.  Genitourinary: No costovertebral angle tenderness.  Lymphatics: No lymphadenopathy neck,   Musculoskeletal: Normal range of motion, Normal strength.  Integumentary: No rash.  Neurologic: Alert, Oriented, No focal deficits, Cranial Nerves II-XII are grossly intact.  Psychiatric: Appropriate mood & affect.    =======================================================  Labs:                        10.7   7.6   )-----------( 200      ( 16 Apr 2019 07:23 )             32.5       WBC Count: 7.6 K/uL (04-16-19 @ 07:23)  WBC Count: 7.2 K/uL (04-14-19 @ 23:44)      04-16    138  |  105  |  22.0<H>  ----------------------------<  120<H>  3.2<L>   |  22.0  |  0.87    Ca    11.0<H>      16 Apr 2019 07:23  Phos  1.9     04-16  Mg     1.9     04-16    TPro  7.1  /  Alb  3.1<L>  /  TBili  0.2<L>  /  DBili  x   /  AST  16  /  ALT  8   /  AlkPhos  82  04-14         ========  < from: CT Chest No Cont (04.15.19 @ 05:12) >   EXAM:  CT CHEST                          PROCEDURE DATE:  04/15/2019          INTERPRETATION:  CT CHEST WITHOUT CONTRAST    HISTORY: worsening cough.    COMPARISON: CT chest 12/29/2018.    TECHNIQUE: CT scan of the chest was performed without intravenous   contrast.    FINDINGS:  No enlarged axillary adenopathy. Mild mediastinal adenopathy with a few   mildly enlarged lymph nodes, including a 1.1 cm in short axis right   paratracheal and 1.5 cm in short axis precarinal lymph nodes. Evaluation   for hilar adenopathy is limited without contrast.    Mild cardiomegaly. Trace pericardial effusion. Coronary artery, mitral   and aortic annular calcifications. Partially visualized right PICC   terminates in the SVC.    Small bilateral pleural effusions, right slightly larger than left, with   adjacent partial atelectasis/consolidations of both lower lobes;   correlate for superimposed pneumonia. There are also patchy mix   groundglass and dense or consolidative opacities in the right upper and  right middle lobes. More mild groundglass opacities are noted in the left   upper lobe.    No pneumothorax. Trachea and central airways are patent.    There is an small air-fluid level in the upper esophagus, consistent with   reflux. Small hiatal hernia.    Limited noncontrast images of the visualized upper abdomen demonstrate a   right parapelvic renal cyst versus mild right hydronephrosis.    Bones are osteopenic with degenerative changes in the spine. Small   sclerosis of the left 5th and 6th ribs likely healed old buckle fractures.    IMPRESSION:  Findings may represent multifocal pneumonia, worse in the right lung,   versus edema. Small bilateral pleural effusions. Mild mediastinal   adenopathy is probably reactive.                JOSSUE MESA., ATTENDING RADIOLOGIST  This document has been electronically signed. Apr 15 2019  5:33AM        < end of copied text >

## 2019-04-17 ENCOUNTER — TRANSCRIPTION ENCOUNTER (OUTPATIENT)
Age: 84
End: 2019-04-17

## 2019-04-17 VITALS — HEART RATE: 68 BPM | DIASTOLIC BLOOD PRESSURE: 62 MMHG | SYSTOLIC BLOOD PRESSURE: 156 MMHG

## 2019-04-17 PROCEDURE — 99285 EMERGENCY DEPT VISIT HI MDM: CPT | Mod: 25

## 2019-04-17 PROCEDURE — 36415 COLL VENOUS BLD VENIPUNCTURE: CPT

## 2019-04-17 PROCEDURE — 94760 N-INVAS EAR/PLS OXIMETRY 1: CPT

## 2019-04-17 PROCEDURE — 87798 DETECT AGENT NOS DNA AMP: CPT

## 2019-04-17 PROCEDURE — 71250 CT THORAX DX C-: CPT

## 2019-04-17 PROCEDURE — 87581 M.PNEUMON DNA AMP PROBE: CPT

## 2019-04-17 PROCEDURE — 83735 ASSAY OF MAGNESIUM: CPT

## 2019-04-17 PROCEDURE — 71045 X-RAY EXAM CHEST 1 VIEW: CPT | Mod: 26

## 2019-04-17 PROCEDURE — 97530 THERAPEUTIC ACTIVITIES: CPT

## 2019-04-17 PROCEDURE — 97163 PT EVAL HIGH COMPLEX 45 MIN: CPT

## 2019-04-17 PROCEDURE — 94640 AIRWAY INHALATION TREATMENT: CPT

## 2019-04-17 PROCEDURE — 85730 THROMBOPLASTIN TIME PARTIAL: CPT

## 2019-04-17 PROCEDURE — 80053 COMPREHEN METABOLIC PANEL: CPT

## 2019-04-17 PROCEDURE — 97116 GAIT TRAINING THERAPY: CPT

## 2019-04-17 PROCEDURE — 85610 PROTHROMBIN TIME: CPT

## 2019-04-17 PROCEDURE — 87040 BLOOD CULTURE FOR BACTERIA: CPT

## 2019-04-17 PROCEDURE — 85027 COMPLETE CBC AUTOMATED: CPT

## 2019-04-17 PROCEDURE — 99239 HOSP IP/OBS DSCHRG MGMT >30: CPT

## 2019-04-17 PROCEDURE — 80048 BASIC METABOLIC PNL TOTAL CA: CPT

## 2019-04-17 PROCEDURE — 87633 RESP VIRUS 12-25 TARGETS: CPT

## 2019-04-17 PROCEDURE — 93005 ELECTROCARDIOGRAM TRACING: CPT

## 2019-04-17 PROCEDURE — 71045 X-RAY EXAM CHEST 1 VIEW: CPT

## 2019-04-17 PROCEDURE — 84100 ASSAY OF PHOSPHORUS: CPT

## 2019-04-17 PROCEDURE — 99232 SBSQ HOSP IP/OBS MODERATE 35: CPT

## 2019-04-17 PROCEDURE — 80202 ASSAY OF VANCOMYCIN: CPT

## 2019-04-17 PROCEDURE — 87486 CHLMYD PNEUM DNA AMP PROBE: CPT

## 2019-04-17 PROCEDURE — 83605 ASSAY OF LACTIC ACID: CPT

## 2019-04-17 RX ORDER — SACCHAROMYCES BOULARDII 250 MG
1 POWDER IN PACKET (EA) ORAL
Qty: 0 | Refills: 0 | DISCHARGE
Start: 2019-04-17

## 2019-04-17 RX ORDER — CEFTRIAXONE 500 MG/1
0 INJECTION, POWDER, FOR SOLUTION INTRAMUSCULAR; INTRAVENOUS
Qty: 0 | Refills: 0 | COMMUNITY

## 2019-04-17 RX ORDER — PIPERACILLIN AND TAZOBACTAM 4; .5 G/20ML; G/20ML
3.38 INJECTION, POWDER, LYOPHILIZED, FOR SOLUTION INTRAVENOUS
Qty: 0 | Refills: 0 | DISCHARGE
Start: 2019-04-17

## 2019-04-17 RX ORDER — IPRATROPIUM/ALBUTEROL SULFATE 18-103MCG
3 AEROSOL WITH ADAPTER (GRAM) INHALATION
Qty: 0 | Refills: 0 | DISCHARGE
Start: 2019-04-17

## 2019-04-17 RX ORDER — POTASSIUM CHLORIDE 20 MEQ
40 PACKET (EA) ORAL EVERY 4 HOURS
Qty: 0 | Refills: 0 | Status: DISCONTINUED | OUTPATIENT
Start: 2019-04-17 | End: 2019-04-17

## 2019-04-17 RX ORDER — VANCOMYCIN HCL 1 G
750 VIAL (EA) INTRAVENOUS
Qty: 0 | Refills: 0 | COMMUNITY
Start: 2019-04-17

## 2019-04-17 RX ORDER — VANCOMYCIN HCL 1 G
1 VIAL (EA) INTRAVENOUS
Qty: 0 | Refills: 0 | COMMUNITY

## 2019-04-17 RX ADMIN — Medication 100 MICROGRAM(S): at 05:26

## 2019-04-17 RX ADMIN — Medication 250 MILLIGRAM(S): at 05:27

## 2019-04-17 RX ADMIN — AMLODIPINE BESYLATE 5 MILLIGRAM(S): 2.5 TABLET ORAL at 05:27

## 2019-04-17 RX ADMIN — PIPERACILLIN AND TAZOBACTAM 25 GRAM(S): 4; .5 INJECTION, POWDER, LYOPHILIZED, FOR SOLUTION INTRAVENOUS at 13:23

## 2019-04-17 RX ADMIN — Medication 250 MILLIGRAM(S): at 05:26

## 2019-04-17 RX ADMIN — PIPERACILLIN AND TAZOBACTAM 25 GRAM(S): 4; .5 INJECTION, POWDER, LYOPHILIZED, FOR SOLUTION INTRAVENOUS at 06:33

## 2019-04-17 RX ADMIN — Medication 325 MILLIGRAM(S): at 13:21

## 2019-04-17 RX ADMIN — ENOXAPARIN SODIUM 40 MILLIGRAM(S): 100 INJECTION SUBCUTANEOUS at 13:22

## 2019-04-17 RX ADMIN — Medication 250 MILLIGRAM(S): at 13:22

## 2019-04-17 RX ADMIN — CHLORHEXIDINE GLUCONATE 1 APPLICATION(S): 213 SOLUTION TOPICAL at 13:23

## 2019-04-17 RX ADMIN — Medication 40 MILLIEQUIVALENT(S): at 13:22

## 2019-04-17 NOTE — PROGRESS NOTE ADULT - ASSESSMENT
This 85 y.o. F   recently seen in hospital for culture negative endocarditis  on PO Vancomycin for prior hx of C diff    here for PNA - bilateral, right vs left.    - continue Zosyn 3.375 Q8H PROLONG infusion here  *** ON DISCHARGE, WILL change to ZOSYN 3.375 GRAMS Q6H - last day is 4/21/19, INCLUSIVELY    *** THEN ON 4/22/19, START CEFTRIAXONE 2 GRAMS q24H, END DATE IS 4/25/19, INCLUSIVELY.  - Continue vancomycin 750mg  IV  Q12H; END DATE IS 4/25/19, INCLUSIVELY.  *** CONTINUE THIS ON DISCHARGE    - will Continue Vancomycin PO for C diff.   Vanco 250mg PO Q8H through 4/22/19, then  Vanco 250mg PO Q12H through 4/29/19, then  Vanco 250mg PO Q24H through 5/6/19, then  stop         FROM ID POINT OF VIEW, CAN PREPARE FOR DISCHARGE TO FACILITY.

## 2019-04-17 NOTE — DISCHARGE NOTE PROVIDER - PROVIDER TOKENS
PROVIDER:[TOKEN:[[id number]],FOLLOWUP:[2 weeks]],PROVIDER:[TOKEN:[[id number]],FOLLOWUP:[2 weeks]]

## 2019-04-17 NOTE — PROGRESS NOTE ADULT - SUBJECTIVE AND OBJECTIVE BOX
[facility]  INFECTIOUS DISEASES AND INTERNAL MEDICINE at [place]  =======================================================  Joe Castillo MD  Diplomates American Board of Internal Medicine and Infectious Diseases  =======================================================    [id number]  YADIRA AMITA   follow up for:  bilateral pneumonia  patient seen and examined.     no fevers  feeling better   still on 4L oxygen      ===================================================  REVIEW OF SYSTEMS:  as above  all other ROS negative    =======================================================  Allergies  penicillins (Rash)    Antibiotics:  piperacillin/tazobactam IVPB. 3.375 Gram(s) IV Intermittent every 8 hours  vancomycin    Solution 250 milliGRAM(s) Oral every 8 hours  vancomycin  IVPB 750 milliGRAM(s) IV Intermittent every 12 hours    ======================================================  Physical Exam:  ============  T(F): 98.3 (17 Apr 2019 07:20), Max: 99.1 (15 Apr 2019 11:32)  HR: 68 (17 Apr 2019 07:20)  BP: 126/73 (17 Apr 2019 07:20)  RR: 18 (17 Apr 2019 07:20)  SpO2: 92% (17 Apr 2019 07:20) (87% - 98%)    General:  No acute distress.  THIN FRAIL  Eye: Pupils are equal, round and reactive to light, Extraocular movements are intact, Normal conjunctiva. NO CONJUNCTIVAL HEMORRHAGES  HENT: Normocephalic, Oral mucosa is moist, edentulous  Neck: Supple, No lymphadenopathy.  Respiratory: Lungs are clear to auscultation ANTERIORLY; slight decreased at bases, unchanged  Cardiovascular: Normal rate, Regular rhythm, + MURMUR  left chest with implanted device  Gastrointestinal: Soft, Non-tender, Non-distended, Normal bowel sounds.  Genitourinary: No costovertebral angle tenderness.  Lymphatics: No lymphadenopathy neck,   Musculoskeletal: Normal range of motion, Normal strength.  Integumentary: No rash.  Neurologic: Alert, Oriented, No focal deficits, Cranial Nerves II-XII are grossly intact.  Psychiatric: Appropriate mood & affect.      ========  < from: CT Chest No Cont (04.15.19 @ 05:12) >   EXAM:  CT CHEST                          PROCEDURE DATE:  04/15/2019          INTERPRETATION:  CT CHEST WITHOUT CONTRAST    HISTORY: worsening cough.    COMPARISON: CT chest 12/29/2018.    TECHNIQUE: CT scan of the chest was performed without intravenous contrast.    FINDINGS:  No enlarged axillary adenopathy. Mild mediastinal adenopathy with a few mildly enlarged lymph nodes, including a 1.1 cm in short axis right paratracheal and 1.5 cm in short axis precarinal lymph nodes. Evaluation for hilar adenopathy is limited without contrast.    Mild cardiomegaly. Trace pericardial effusion. Coronary artery, mitral and aortic annular calcifications. Partially visualized right PICC terminates in the SVC.    Small bilateral pleural effusions, right slightly larger than left, with adjacent partial atelectasis/consolidations of both lower lobes; correlate for superimposed pneumonia. There are also patchy mix groundglass and dense or consolidative opacities in the right upper andright middle lobes. More mild groundglass opacities are noted in the left upper lobe.    No pneumothorax. Trachea and central airways are patent.    There is an small air-fluid level in the upper esophagus, consistent with reflux. Small hiatal hernia.    Limited noncontrast images of the visualized upper abdomen demonstrate a right parapelvic renal cyst versus mild right hydronephrosis.    Bones are osteopenic with degenerative changes in the spine. Small sclerosis of the left 5th and 6th ribs likely healed old buckle fractures.    IMPRESSION:  Findings may represent multifocal pneumonia, worse in the right lung, versus edema. Small bilateral pleural effusions. Mild mediastinal adenopathy is probably reactive.    JOSSUE WEINSTEIN, ATTENDING RADIOLOGIST  This document has been electronically signed. Apr 15 2019  5:33AM        < end of copied text >    =======================================================  Labs:                        10.7   7.6   )-----------( 200      ( 16 Apr 2019 07:23 )             32.5       WBC Count: 7.6 K/uL (04-16-19 @ 07:23)  WBC Count: 7.2 K/uL (04-14-19 @ 23:44)      04-16    138  |  105  |  22.0<H>  ----------------------------<  120<H>  3.2<L>   |  22.0  |  0.87    Ca    11.0<H>      16 Apr 2019 07:23  Phos  1.9     04-16  Mg     1.9     04-16        Culture - Blood (collected 04-14-19 @ 23:58)  Source: .Blood    Culture - Blood (collected 04-14-19 @ 23:57)  Source: .Blood

## 2019-04-17 NOTE — DISCHARGE NOTE PROVIDER - HOSPITAL COURSE
85F with a prior admission for endocarditis presented from the rehabilitation facility with fever, dyspnea, cough, and hypoxia. On presentation, WBC(7.2), H/H(10.9/33.3), BUN/Cr(29/0.77), Ca(11), Lactate(0.6). Chest Xray(4/15) noted mild pulmonary edema and small bilateral effusions. CT of the chest(4/15) noted small bilateral pleural effusions with adjacent partial atelectasis/consolidations lower lobes; patchy mix groundglass and dense or consolidative opacities in the right upper and right middle lobes, mild groundglass opacities are noted in the left upper lobe. Intravenous antibiotics were initiated for pneumonia. The patient was also continued on vancomycin which was previously initiated for Cdiff colitis during the prior admission. The patient was seen by Infectious Disease in consultation and the antibiotics were adjusted. The patient remained afebrile and was continued on supplemental oxygen. The patient was seen by Infectious Disease and the course of antibiotics was finalized. The patient was discharged to the rehabilitation facility for further management with instructions to follow up with Infectious Disease for further management.        38 minutes total time 85F with a prior admission for endocarditis presented from the rehabilitation facility with fever, dyspnea, cough, and hypoxia. On presentation, WBC(7.2), H/H(10.9/33.3), BUN/Cr(29/0.77), Ca(11), Lactate(0.6). Chest Xray(4/15) noted mild pulmonary edema and small bilateral effusions. CT of the chest(4/15) noted small bilateral pleural effusions with adjacent partial atelectasis/consolidations lower lobes; patchy mix groundglass and dense or consolidative opacities in the right upper and right middle lobes, mild groundglass opacities are noted in the left upper lobe. Intravenous antibiotics were initiated for pneumonia. The patient was also continued on vancomycin which was previously initiated for Cdiff colitis during the prior admission. The patient was seen by Infectious Disease in consultation and the antibiotics were adjusted. The patient remained afebrile and was continued on supplemental oxygen. The patient was seen by Infectious Disease and the course of antibiotics was finalized. The patient was discharged to the rehabilitation facility for further management with instructions to follow up with Infectious Disease for further management.            38 minutes total time

## 2019-04-17 NOTE — PROGRESS NOTE ADULT - SUBJECTIVE AND OBJECTIVE BOX
YADIRA AMITA  ----------------------------------------  The patient was seen and evaluated for pneumonia.  The patient is in no acute distress.  Denied any chest pain, palpitations, dyspnea, or abdominal pain.  Offers no complaints.    Vital Signs Last 24 Hrs  T(C): 36.8 (17 Apr 2019 07:20), Max: 36.9 (16 Apr 2019 15:00)  T(F): 98.3 (17 Apr 2019 07:20), Max: 98.5 (16 Apr 2019 15:00)  HR: 68 (17 Apr 2019 07:20) (68 - 79)  BP: 126/73 (17 Apr 2019 07:20) (126/73 - 137/66)  BP(mean): --  RR: 18 (17 Apr 2019 07:20) (18 - 18)  SpO2: 92% (17 Apr 2019 07:20) (92% - 92%)    PHYSICAL EXAMINATION:  ----------------------------------------  General appearance: No acute distress, Awake, Alert  HEENT: Normocephalic, Atraumatic, Conjunctiva clear, EOMI  Neck: Supple, No JVD, No tenderness  Lungs: Breath sound equal bilaterally, No wheezes, Mild rales  Cardiovascular: S1S2, Regular rhythm  Abdomen: Soft, Nontender, Nondistended, No guarding/rebound, Positive bowel sounds  Extremities: No clubbing, No cyanosis, No calf tenderness, Left upper extremity edema improved  Neuro: Strength equal bilaterally, No tremors  Psychiatric: Appropriate mood, Normal affect    LABORATORY STUDIES:  ----------------------------------------             10.7   7.6   )-----------( 200      ( 16 Apr 2019 07:23 )             32.5     04-16    138  |  105  |  22.0<H>  ----------------------------<  120<H>  3.2<L>   |  22.0  |  0.87    Ca    11.0<H>      16 Apr 2019 07:23  Phos  1.9     04-16  Mg     1.9     04-16    PT/INR - ( 16 Apr 2019 07:23 )   PT: 12.3 sec;   INR: 1.07 ratio      Culture - Blood (collected 14 Apr 2019 23:58)  Source: .Blood  Preliminary Report (17 Apr 2019 01:01):    No growth at 48 hours    Culture - Blood (collected 14 Apr 2019 23:57)  Source: .Blood  Preliminary Report (17 Apr 2019 01:01):    No growth at 48 hours    MEDICATIONS  (STANDING):  amLODIPine   Tablet 5 milliGRAM(s) Oral daily  aspirin enteric coated 325 milliGRAM(s) Oral daily  atorvastatin 10 milliGRAM(s) Oral at bedtime  chlorhexidine 2% Cloths 1 Application(s) Topical daily  enoxaparin Injectable 40 milliGRAM(s) SubCutaneous every 24 hours  levothyroxine 100 MICROGram(s) Oral daily  piperacillin/tazobactam IVPB. 3.375 Gram(s) IV Intermittent every 8 hours  potassium chloride    Tablet ER 40 milliEquivalent(s) Oral every 4 hours  saccharomyces boulardii 250 milliGRAM(s) Oral two times a day  vancomycin    Solution 250 milliGRAM(s) Oral every 8 hours  vancomycin  IVPB 750 milliGRAM(s) IV Intermittent every 12 hours    MEDICATIONS  (PRN):  acetaminophen   Tablet .. 650 milliGRAM(s) Oral every 6 hours PRN Temp greater or equal to 38C (100.4F), Mild Pain (1 - 3)  ALBUTerol/ipratropium for Nebulization 3 milliLiter(s) Nebulizer every 6 hours PRN Shortness of Breath and/or Wheezing  bisacodyl 5 milliGRAM(s) Oral every 12 hours PRN Constipation  magnesium hydroxide Suspension 30 milliLiter(s) Oral daily PRN Constipation      ASSESSMENT / PLAN:  ----------------------------------------  85F with a prior admission for endocarditis and clostridium difficile colitis presented from the assisted living facility for pneumonia and hypoxia.    Respiratory failure / Pneumonia - Supplemental oxygen as needed. On piperacillin/tazobactam for suspected gram negative bacteria. Afebrile and without leukocytosis.    Endocarditis - On intravenous antibiotics. Infectious Disease follow up noted.    Clostridium difficile colitis - On vancomycin. Abdominal examination benign. No diarrhea reported.    Hypothyroidism - On levothyroxine.    Hypertension - Close blood pressure monitoring.    Discussed with the patient and her son at the bedside.

## 2019-04-17 NOTE — DISCHARGE NOTE PROVIDER - NSDCCPCAREPLAN_GEN_ALL_CORE_FT
PRINCIPAL DISCHARGE DIAGNOSIS  Diagnosis: Pneumonia  Assessment and Plan of Treatment: Complete the course of antibiotics. Continue on supplemental oxygen as needed.      SECONDARY DISCHARGE DIAGNOSES  Diagnosis: Hypercalcemia  Assessment and Plan of Treatment: Follow up with your primary care physician for further management. Continue to monitor calcium levels, follow up with Endocrinology.    Diagnosis: Endocarditis  Assessment and Plan of Treatment: Complete the course of antibiotics as previously prescribed    Diagnosis: Clostridium difficile colitis  Assessment and Plan of Treatment: Continue on vancomycin as previously prescribed    Diagnosis: Hypothyroid  Assessment and Plan of Treatment: Continue on levothyroxine

## 2019-04-17 NOTE — DISCHARGE NOTE NURSING/CASE MANAGEMENT/SOCIAL WORK - NSDCDPATPORTLINK_GEN_ALL_CORE
You can access the [facility] Patient Portal, offered by [facility], by registering with the following website: [email or web address]

## 2019-04-17 NOTE — DISCHARGE NOTE PROVIDER - CARE PROVIDER_API CALL
Pelon Degroot)  Geriatric Medicine; Internal Medicine  [address] [place] [address]  Phone: [phone]  Fax: [phone]  Follow Up Time: 2 weeks    Sj Rao)  Infectious Disease; Internal Medicine  [address] [place] [address], [place] [address]  Phone: [phone]  Fax: [phone]  Follow Up Time: 2 weeks

## 2019-05-11 NOTE — ED PROVIDER NOTE - CADM POA CENTRAL LINE
RLQ/R Flank pain x 3 days, denies urinary symptoms, denies discharge, reports N&V and normal bowel habits. Pt reports unable to urinate at this time. RLQ abd tenderness.   No

## 2019-11-21 NOTE — H&P ADULT - PROBLEM/PLAN-1
Detail Level: Detailed Continue Regimen: Ketaconazol Cream daily \\nKetaconazole Shampoo DISPLAY PLAN FREE TEXT

## 2020-08-13 NOTE — ED ADULT NURSE NOTE - STRIKING AGAINST
PT AAOX4. AMBULATORY WITH STEADY GAIT. BIBSELF C/O SI TO JUMP INFRONT OF 
TRAFFIC, -HI. PLACED IN BED 14 ON MONITOR AND PULSE OX. BELONGINGS PALCED IN 
LOCKER. SITTER AT BEDSIDE. NO ACUTE DISTRESS NOTED. sink

## 2020-09-29 NOTE — DISCHARGE NOTE NURSING/CASE MANAGEMENT/SOCIAL WORK - NSDCPEPTCAREGIVEDUMATLIST _GEN_ALL_CORE
MD Notification    Notified Person: MD    Notified Person Name: Dr. Callahan    Notification Date/Time: 09/29/20 12:01    Notification Interaction:    Purpose of Notification: patient is less responsive than the report I receive. HR tachy  Orders Received: placed on BIPAP, blood gas done  Comments:     Influenza Vaccination

## 2020-10-09 NOTE — PATIENT PROFILE ADULT - STATED REASON FOR ADMISSION
Chief complaint:   Chief Complaint   Patient presents with   • Other     sent by PCP for strep test. Denies symptoms       Vitals:  Visit Vitals  /67   Pulse (!) 56   Temp 97.7 °F (36.5 °C) (Temporal)   Resp 16   Ht 5' 3\" (1.6 m)   Wt 53.1 kg   SpO2 99%   BMI 20.73 kg/m²       HISTORY OF PRESENT ILLNESS     HPI     Patient presents partner because primary MD sent her for strep testing.  She is asymptomatic otherwise.  She just had COVID test done on Tuesday and waiting for results.  She is going to extended care facility, so PCP wanted to be tested for strep also.  Otherwise she does not have any sore throat fever chills ingestion.  History provided by  because patient has memory difficulties.    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   • Arrhythmia    • Bilateral sensorineural hearing loss 3/20/2017   • Clavicle fracture 03/2012   • Closed right humeral fracture 2011   • Dementia (CMS/[facility])     • Dysarthria 3/20/2017   • Expressive aphasia 3/20/2017   • Hepatitis 1979   • Left bundle branch block 1999   • Memory difficulties 3/20/2017   • Migraine    • SK (seborrheic keratosis)     back   • Trigger finger        Past Surgical History:   Procedure Laterality Date   • Cardiovascular stress test N/A 1999    left bundle branch block   • Closed reduction humerus fracture  2011   • Excision of lingual tonsil         Family History:  Family History   Problem Relation Age of Onset   • Other Mother         hydrocephalus   • Heart disease Father         CAD   • Stroke Father    • Cancer, Breast Sister    • Heart disease Brother         CAD       Social History:  Social History     Tobacco Use   • Smoking status: Never Smoker   • Smokeless tobacco: Never Used   • Tobacco comment: Tiny bit in high school   Substance Use Topics   • Alcohol use: No     Frequency: 2-3 times a week     Drinks per session: 3 or 4     Binge frequency: Weekly     Comment: Socailly,1-2 per week       REVIEW OF SYSTEMS     Review of Systems    PHYSICAL EXAM     Physical Exam  Blood pressure 135/67, pulse (!) 56, temperature 97.7 °F (36.5 °C), temperature source Temporal, resp. rate 16, height 5' 3\" (1.6 m), weight 53.1 kg, SpO2 99 %.   General Appearance:  Patient appears well developed and well nourished, alert and cooperative. No acute distress.  Eyes-conjunctivae normal. Pupils are round and equally reactive to light and accommodation. Extraocular muscles intact. Eyelids appear normal without any swelling. No eye discharge.  Oropharynx-oropharynx not injected. No tonsillar hypertrophy. No tonsillar exudates. Uvula is midline. Tongue appears normal. Lips appear normal.  Nose-no nasal drainage. Normal mucosa.  Sinuses-no sinus tenderness elicited. No facial swelling.  Ears-external appearance normal. Tympanic membrane normal, right ear. Tympanic membrane normal, left ear. Normal right external auditory canal. Normal left external auditory  canal .No ear drainage. Hearing grossly intact.  Lymph Nodes: No cervical adenopathy. No submandibular adenopathy.   Neck: Thyromegaly not noted. No stiffness. Neck supple. Full range of motion.  Chest Examination: Accessory muscle use not noted. Normal respiratory effort. Normal breath sounds. Rales not noted. Wheezes not noted.     Cardiovascular Examination: Normal rate, regular rhythm and normal heart sounds. No murmurs.    Skin Examination: Skin feels warm. Normal capillary refill. Rash not noted.    ASSESSMENT/PLAN     Nathaly was seen today for other.    Diagnoses and all orders for this visit:    Worried well  -     POCT RAPID STREP A    Rapid strep negative  See patient instructions for complete plan details.     Because I fell

## 2020-12-08 NOTE — PATIENT PROFILE ADULT - NSPROPTRIGHTBILLOFRIGHTS_GEN_A_NUR
[facility]  PROGRESS NOTE:  SINGLE VISIT AMBULATORY CARE    Speech Therapy    Type of Session:  Treatment Session  Date: 12/8/2020    Name: Toribio Saravia  Gender: male  YOB: 2013  Age: 7  Yrs 8  Mos  Treatment Diagnosis: Mixed Receptive-Expressive Language Disorder (F80.2)  Clinician Name: JUDITH Braga   Time In: 100   Time out: 200    Rehab Precautions:    none    Place of Service:  Televideo: Child presents for a telehealth visit via live interactive video with a secure connection. This visit was not recorded or stored.     Consent for telehealth visit was verified with the parent/guardian, including risk of electronic data, possibility of equipment failure or need for in person visit if condition cannot be fully assessed by telehealth. Parent/guardian was also informed that consent to treat includes permission to submit charges to the patient's insurance.     Provider Location: Home in the [facility]  Patient Location: Home in the [facility]    Platform used: Zoom  Reason for use of telehealth: minimize risk of potential exposure to viral respiratory illness during Covid 19 pandemic    Reason for Visit: SPEECH AND LANGUAGE THERAPY    SUBJECTIVE:  The following persons were present during the session: Patient, Mother and Kristen Dominique, Behavior Therapist.    During today's session the child was cooperative, playful, sensory seeking and distractible.    Pain Scale: patient caregiver reports pain is not an issue/concern    OBJECTIVE:  Treatment performed to address the functional goals as stated in the current INDIVIDUAL TREATMENT PLAN:     Functional Limitations Addressed: Language Disorder and Articulation/Phonological Delay/Disorder    Treatment Activities:  Play-Based Intervention, Sensory Motor Activities, Oral-Motor Activities, Technology Based Learning and Clinical Observation    Home Program: Oral Motor  Exercises, Articulation Exercises and Language Exercises    Provided by: discussion and demonstration    Education Results of Members Present and Participating: Verbalizes understanding    ASSESSMENT:  The following progress was made towards the individual's goals: Toribio produced initial /l/ with visual and verbal models with <25% accuracy. He produced medial /l/ with visual and verbal models with <25% accuracy. Toribio was given visual support for answering wh questions including: what, who, and where. He needed moderate support, verbally, to answer these questions about a visual scene with a visual F3 for the answers. When answering he was 50% accuracy. Continues to need reminders about \"using a complete sentence.\" Toribio continues to require movement breaks throughout the session to aid in his attention.      Pain/Behaviors after treatment:  none    PLAN:  Continue weekly    JUDITH Braga   12/8/2020    patient

## 2021-07-20 NOTE — OCCUPATIONAL THERAPY INITIAL EVALUATION ADULT - PROPRIOCEPTION, LLE, OT EVAL
[FreeTextEntry3] : I, Sue Kirkland, HEATHER-BC have acted as a scribe and documented the HPI information for Dr Vargas . The HPI documentation completed by the scribe is an accurate record of both my words and actions.\par  [FreeTextEntry2] : \par  [Time Spent: ___ minutes] : I have spent [unfilled] minutes of time on the encounter. within normal limits

## 2021-09-11 NOTE — DISCHARGE NOTE PROVIDER - HOSPITAL COURSE
Pt signed DOLORES for his bother Meagan Stearns [phone]. Meagan Stearns reports pt can return home, he is unable to pick pt up. Meagan Stearns reports pt does have hunting knives, he stated he will remove them before pt comes home. Meagan Stearns is requesting for pt to be discharged with meds in hand because they have limited transportation. He stated no concerns for pt, reporting he sounded better on the phone than he has in years. No other questions or concerns, offered to assist as needed. 84 yo woman with recurrent falls, s/p loop recorder in dec admitted for fall, weakness, recently admitted with C diff positive  on po vanco, during last admission cardiology consulted, loop recorder interrogated , no cardiac events , dehydration got some IV fluids, Pt is readmitted on 03/25 for weakness, urine c/s sent during last admission growing E coli, pt denied urinary complaints, afebrile, normal WBC, repeat u/a no pyouria, no need of abx for ?UTI due to recent C diff seen by ID, patient was noted to have Positive troponin, seen by cardiology and was c/w aspirin, statin, she was on metoprolol due to sinus bradycardia stopped Metoprolol, patient had TTE done showed LVEF >75%, grade 3 diastolic dysfunction, Mobile echodensity visualized measuring 1.5 x .7 x 1.0 cm, noted on  posterior mitral leaflet on atrial side, MARYCHUY done showed Mobile mass/vegetation 1.38 x 0.7 cm seen on posterior mitral valve leaflet P2 with mild to moderate MR likely consistent with endocarditis,  Left ventricular ejection fraction, by visual estimation, is 55to 60%, CRP and ESR elevated, patient has been started on Ceftriaxone and Vancomycin, elevated CRP and ESR, patient has been started on Ceftriaxone and Vancomycin, all blood cultures negative, no fever spikes, followed by ID and recommended continue Vancomycin and Ceftriaxone, patient is s/p PICC placement for 4 weeks of IV Ceftriaxone and Vancomycin - Needs WEEKLY CBC CMP ESR CRP Vancomycin trough faxed to my office - [phone] (fax), END DATE for antibiotics IS 4/25/19, INCLUSIVELY,     Regarding C diff diarrhea, he suggest at time of discharge to do the following.     Vancomycin 250mg PO Q6H x 14 days, then     Vancomycin 250mg PO Q8H x 7 days, then     Vancomycin 250mg PO Q12H x 7 days, then     Vancomycin 250mg PO Q24H  - until visit with INFECTIOUS DIEASE office.             She has Hx of Fall, likely multifactorial, due to age related physical debility and C DIFF, leading to dehydration, Pt eval requested, Sw eval for rehab.     She has Hx of Chronic hypercalcemia, her  , likely primary hyperparathyroidism, f/u endocrinology as an outpatient, she was noted to have calcium trending up, given IV fluids.              A/P        >C diff- c/w Po vanco.                 >Fall - debility - multifactorial -  due to age related physical debility and CDIFF, Pt eval requested, once medically clear she is going for rehab to [facility].         >Positive troponin - noted on last admission, appreciate cardiology input -troponin flat, c/w aspirin, statin, due to sinus bradycardia stopped Metoprolol         >HTN - start amlodipine     Monitor BP        >Chronic hypercalcemia -   - likely primary hyperparathyroidism, f/u endocrinology as an outpatient, her calcium is going up today is >12, has been started on IV fluids, will monitor BMP        >DVT PPX- heparin 86 yo woman with recurrent falls, s/p loop recorder in dec admitted for fall, weakness, recently admitted with C diff positive  on po vanco, during last admission cardiology consulted, loop recorder interrogated , no cardiac events , dehydration got some IV fluids, Pt is readmitted on 03/25 for weakness, urine c/s sent during last admission growing E coli, pt denied urinary complaints, afebrile, normal WBC, repeat u/a no pyouria, no need of abx for ?UTI due to recent C diff seen by ID, patient was noted to have Positive troponin, seen by cardiology and was c/w aspirin, statin, she was on metoprolol due to sinus bradycardia stopped Metoprolol, patient had TTE done showed LVEF >75%, grade 3 diastolic dysfunction, Mobile echodensity visualized measuring 1.5 x .7 x 1.0 cm, noted on  posterior mitral leaflet on atrial side, MARYCHUY done showed Mobile mass/vegetation 1.38 x 0.7 cm seen on posterior mitral valve leaflet P2 with mild to moderate MR likely consistent with endocarditis,  Left ventricular ejection fraction, by visual estimation, is 55to 60%, CRP and ESR elevated, patient has been started on Ceftriaxone and Vancomycin, elevated CRP and ESR, patient has been started on Ceftriaxone and Vancomycin, all blood cultures negative, no fever spikes, followed by ID and recommended continue Vancomycin and Ceftriaxone, patient is s/p PICC placement for 4 weeks of IV Ceftriaxone and Vancomycin - Needs WEEKLY CBC CMP ESR CRP Vancomycin trough faxed to my office - [phone] (fax), END DATE for antibiotics IS 4/25/19, INCLUSIVELY,     Regarding C diff diarrhea, he suggest at time of discharge to do the following.     Vancomycin 250mg PO Q6H x 14 days, then     Vancomycin 250mg PO Q8H x 7 days, then     Vancomycin 250mg PO Q12H x 7 days, then     Vancomycin 250mg PO Q24H  - until visit with INFECTIOUS DIEASE office.     She has Hx of Chronic hypercalcemia, her  , likely primary hyperparathyroidism, f/u endocrinology as an outpatient, she was noted to have calcium trending up, given IV fluids now down to baseline,     needed to have repeat level in 1 week and follow up with endocrinologist.     She has Hx of Fall, likely multifactorial, due to age related physical debility and C DIFF, leading to dehydration, Pt evaluated and recommended KATE.      Patient is doing well, she has no more diarrhea, she is being discharged to [facility] in a stable condition.         Vital Signs Last 24 Hrs    T(C): 36.7 (02 Apr 2019 11:00), Max: 37 (01 Apr 2019 20:45)    T(F): 98.1 (02 Apr 2019 11:00), Max: 98.6 (01 Apr 2019 20:45)    HR: 56 (02 Apr 2019 11:00) (56 - 69)    BP: 121/68 (02 Apr 2019 11:00) (121/68 - 142/61)    RR: 18 (02 Apr 2019 11:00) (16 - 18)    SpO2: 95% (02 Apr 2019 11:00) (94% - 95%)        PHYSICAL EXAM:        GENERAL: Elderly female looking comfortable    HEENT: PERRL, +EOMI    NECK: soft, Supple, No JVD,     CHEST/LUNG: Clear to auscultate bilaterally; No wheezing    HEART: S1S2+, Regular rate and rhythm; systolic murmurs    ABDOMEN: Soft, Nontender, Nondistended; Bowel sounds present    EXTREMITIES:  2+ Peripheral Pulses, No edema    SKIN: No rashes or lesions    NEURO: AAOX3, no focal deficits, no motor r sensory loss    PSYCH: normal mood        Total time spent 40 minutes

## 2022-05-23 NOTE — PROCEDURE NOTE - NSPROCNAME_GEN_A_CORE
PICC Line Insertion dw dr vaughan- urology- has not followed up since he was seen in hospital for his appy-  use more lube, no abx, recc- leave castillo cath in to allow him to heal. otherwise not much to do he will be happy to follow up in office. urine getting lighter will dc with leg bag

## 2022-08-03 NOTE — PHYSICAL THERAPY INITIAL EVALUATION ADULT - OCCUPATION
Received fax from pharmacy requesting refill on pts medication(s). Pt was last seen in office on 5/20/2022  and has a follow up scheduled for Visit date not found. Will send request to  [facility] [place]  for authorization.      Requested Prescriptions     Pending Prescriptions Disp Refills    famotidine (PEPCID) 20 MG tablet [Pharmacy Med Name: Famotidine 20 MG Oral Tablet] 180 tablet 0     Sig: Take 1 tablet by mouth twice daily
retired

## 2022-08-05 NOTE — PROGRESS NOTE ADULT - REASON FOR ADMISSION
generalized weakness
Unique

## 2023-04-06 NOTE — PATIENT PROFILE ADULT - PRO INTERPRETER NEED 2
Cardiology Clinic Note: Kelly Scott CNP     Referred by:   Gene Walker MD  [address]  [address]  [place] [address]     Primary care physician: Gene Walker MD     Date of Service: 2023       Consultation for Stacy Wan  : 1955      Subjective:   Stacy Wan is a 67 year old female here for an EP follow up visit.  PMH includes tachycardia, neurocardiogenic syncope, mild AS/AI, massive PE  on eliquis, Hodgkin's disease s/p chemo/radiation, CVA, asthma, right breast CA s/p bilateral mastectomy , renal CA s/p nephrectomy , former smoker, COPD on O2, migraine HAs.     Last saw Dr. Rivero 10/4/22.  Follows pulmonologist at [facility].  2022 Holter showed sinus rhythm average HR 99, occasional PACs/PVCs.  22 MPI stress test showed normal perfusion.  Chronic ongoing syncope comes in cycles.  Sometimes happens constantly and other times few and far between and goes 1-2 weeks without syncope.  In cycle now with 3-4 x/day.  Last episode yesterday.  Can occur when standing or sitting and gets lightheaded/presyncope and not usually witnessed.  Reports is not orthostatic hypotension.  LOC can be like a light switch going out for seconds only.  Sometimes has presyncope with tunnel vision and other times no aura.    Wears oxygen 2L/NC.  Wears compression stockings daily.  SBP at home typically 110's.    No new chest discomfort.  No worsened SOB, Denies current palpitations.            Review of Systems   Constitutional: Negative for fatigue and unexpected weight change.   HENT: Negative for nosebleeds.    Respiratory: Negative for cough and shortness of breath.    Cardiovascular: Negative for chest pain, palpitations and leg swelling.   Gastrointestinal: Negative for abdominal distention, abdominal pain and blood in stool.   Endocrine: Negative for polyuria.   Genitourinary: Negative for hematuria.   Musculoskeletal: Negative for myalgias.   Skin: Negative for  English pallor.   Neurological: Negative for syncope and light-headedness.   Hematological: Does not bruise/bleed easily.   Otherwise complete ROS is negative.      Past Medical History:   Diagnosis Date   • Breast cancer (CMD) 2009   • Chronic peritonitis (CMD)    • COPD (chronic obstructive pulmonary disease) (CMD)    • DVT (deep venous thrombosis) (CMD)     recurring   • Hodgkin's disease (CMD) 1977   • Kidney tumor     LEFT   • Pulmonary emboli (CMD)    • Thyroid disease        @     Family History   Problem Relation Age of Onset   • Patient is unaware of any medical problems Sister    • Cancer Maternal Aunt    • Cancer, Breast Maternal Aunt         Social History     Tobacco Use   • Smoking status: Never   • Smokeless tobacco: Never   Vaping Use   • Vaping Use: never used   Substance Use Topics   • Alcohol use: Yes   • Drug use: Yes     Types: Marijuana, Prescription Drugs        ALLERGIES:   Allergen Reactions   • Aspirin Nausea & Vomiting, NAUSEA and Other (See Comments)   • Codeine PRURITUS, RASH and ANAPHYLAXIS   • Macrolides And Ketolides Nausea & Vomiting, PRURITUS, Other (See Comments) and ANAPHYLAXIS   • Metoclopramide Other (See Comments)     abnormal movments  Dyskinesias      • Oxycodone-Acetaminophen ANAPHYLAXIS   • Prochlorperazine ANAPHYLAXIS   • Sumatriptan Succinate Angioedema   • Pentazocine PRURITUS and RASH     Current Outpatient Medications   Medication Sig   • metoPROLOL tartrate (LOPRESSOR) 25 MG tablet Take 1 tablet by mouth in the morning and 1 tablet in the evening.   • sucralfate (CARAFATE) 1 g tablet    • amitriptyline (ELAVIL) 50 MG tablet 100 mg.    • promethazine (PHENERGAN) 25 MG tablet    • levothyroxine 88 MCG tablet    • apixaBAN (ELIQUIS) 5 MG Tab Take 5 mg by mouth.   • albuterol 108 (90 Base) MCG/ACT inhaler Inhale 2 puffs into the lungs.     No current facility-administered medications for this visit.        Objective:     Physical Exam:   Visit Vitals  /61 (BP Location: [facility] [place]  - Left upper extremity, Patient Position: Sitting, Cuff Size: Regular) Comment (Cuff Size): long   Pulse 86   Ht 5' 5\" (1.651 m)   Wt 76.5 kg (168 lb 8.7 oz)   SpO2 99%   BMI 28.05 kg/m²     Wt Readings from Last 4 Encounters:   04/06/23 76.5 kg (168 lb 8.7 oz)   01/19/23 75.3 kg (166 lb)   11/02/22 75.3 kg (166 lb)   10/04/22 75.4 kg (166 lb 3.6 oz)     General: NAD  Eyes:  No arcus; No xanthelasma  Neck: No JVD sitting; no carotid bruits  Respiratory: Symmetric chest expansion, No crackles, wheezes or dullness  Cardiovascular:  Rhythm regular; Normal S1, S2 split normal; 3/6 systolic murmur  GI: Not distended  MSK: Muscle mass as expected for age   Extremities: No edema  Neurologic:  Alert, no tremor  Mood: Euthymic  Skin: Intact         Labs:    No results found for: CHOLESTEROL  No results found for: HDL  No results found for: CHOHDL  No results found for: TRIGLYCERIDE  No results found for: CALCLDL     No results found for: HGBA1C     WBC (THOUSAND/mcL)   Date Value   12/27/2018 8.5     RBC (MILLION/mcL)   Date Value   12/27/2018 3.83 (L)     No results found for: HCT  HGB (gm/dL)   Date Value   12/27/2018 10.8 (L)     PLT (THOUSAND/mcL)   Date Value   12/27/2018 420        Sodium (mmol/L)   Date Value   12/27/2018 141     Potassium (mmol/L)   Date Value   12/27/2018 3.8     No results found for: CHLORIDE  Glucose (mg/dL)   Date Value   12/27/2018 99     No results found for: CALCIUM  Carbon Dioxide (mmol/L)   Date Value   12/27/2018 28     BUN (mg/dL)   Date Value   12/27/2018 11     Creatinine (mg/dL)   Date Value   12/27/2018 1.11 (H)        No results found for: AST  No results found for: GPT  No results found for: GGTP  No results found for: ALKPT  No results found for: BILIRUBIN  No results found for: MAGNESIUM    No results found for: TSH     No results found for: BNP    No results found for: DIG     The ASCVD Risk score (Huang PERLA, et al., 2019) failed to calculate for the following reasons:    Cannot  find a previous HDL lab    Cannot find a previous total cholesterol lab            Imaging:  Results for orders placed or performed in visit on 04/01/22   Electrocardiogram 12-Lead    Impression    Sinus tachycardia HELENA LVH by voltage        Impression/Report/Plan:  Problem List Items Addressed This Visit        Cardiac and Vasculature    Neurocardiogenic syncope - Primary    Sinus tachycardia    Encounter for monitoring beta blocker therapy    Relevant Orders    Electrocardiogram 12-Lead   Other Visit Diagnoses     Aortic valve stenosis, etiology of cardiac valve disease unspecified        Relevant Orders    SERVICE TO CARDIOLOGY    Aortic valve insufficiency, etiology of cardiac valve disease unspecified        Relevant Orders    SERVICE TO CARDIOLOGY    Aortic valve sclerosis        Relevant Orders    SERVICE TO CARDIOLOGY          EKG shows sinus rhythm, no ectopy, VR 84.  Chronic neurocardiogenic syncope- in one of her cycles now.  No reported low BP readings at home.  Wearing compression stockings and avoiding triggers.  Will have her continue current BB dose.  For aortic valve disease, will place referral to general cardiologist.  May benefit from autonomic disorder specialist- given information.  Instructed to call for any hypotensive < 100 BP readings at home- would consider midodrine.  Follow up with Dr. Rothman as already scheduled.    Kelly Scott NP  Adult nurse practitioner  Cardiology/cardiac electrophysiology    Contact via [employer]

## 2023-04-26 NOTE — H&P ADULT - RS GEN PE MLT RESP DETAILS PC
"Spoke with patient, she stated that bowel prep was unsuccessful. She was unable \"to keep the prep down\". She would like to reschedule and use an alternative bowel prep. She has nausea and vomiting. Sent Sutab prescription to [facility] pharmacy in [place]. Rescheduled EGD/Colonoscopy at [facility] 8/8/23.  " airway patent/breath sounds equal/good air movement

## 2024-02-11 NOTE — OCCUPATIONAL THERAPY INITIAL EVALUATION ADULT - PERSONAL SAFETY AND JUDGMENT, REHAB EVAL
No distress noted. Pt. falls asleep and states she does not want medications. Pt is A&Ox2, denies pain, SOB or any other acute distress. Pupils 3mm. Fixed. Do not react to flashlight.  pt.s states she is able to see shadows only. intact

## 2024-03-26 NOTE — PATIENT PROFILE ADULT - NSPROSPIRITUALVALUESFT_GEN_A_NUR
Quality 226: Preventive Care And Screening: Tobacco Use: Screening And Cessation Intervention: Patient screened for tobacco use and is an ex/non-smoker Detail Level: Detailed Quality 431: Preventive Care And Screening: Unhealthy Alcohol Use - Screening: Patient not identified as an unhealthy alcohol user when screened for unhealthy alcohol use using a systematic screening method Quality 130: Documentation Of Current Medications In The Medical Record: Current Medications Documented Sikhism

## 2024-05-09 ENCOUNTER — INPATIENT (INPATIENT)
Facility: HOSPITAL | Age: 89
LOS: 4 days | Discharge: NURSING, REHAB OR LONG-TERM CARE | DRG: 188 | End: 2024-05-14
Attending: STUDENT IN AN ORGANIZED HEALTH CARE EDUCATION/TRAINING PROGRAM | Admitting: STUDENT IN AN ORGANIZED HEALTH CARE EDUCATION/TRAINING PROGRAM
Payer: MEDICARE

## 2024-05-09 VITALS
RESPIRATION RATE: 18 BRPM | SYSTOLIC BLOOD PRESSURE: 147 MMHG | WEIGHT: 104.94 LBS | HEART RATE: 64 BPM | OXYGEN SATURATION: 99 % | DIASTOLIC BLOOD PRESSURE: 70 MMHG | TEMPERATURE: 98 F

## 2024-05-09 DIAGNOSIS — J90 PLEURAL EFFUSION, NOT ELSEWHERE CLASSIFIED: ICD-10-CM

## 2024-05-09 PROBLEM — J18.9 PNEUMONIA, UNSPECIFIED ORGANISM: Chronic | Status: ACTIVE | Noted: 2019-04-15

## 2024-05-09 PROBLEM — I38 ENDOCARDITIS, VALVE UNSPECIFIED: Chronic | Status: ACTIVE | Noted: 2019-04-15

## 2024-05-09 LAB
ALBUMIN SERPL ELPH-MCNC: 3 G/DL — LOW (ref 3.3–5.2)
ALP SERPL-CCNC: 104 U/L — SIGNIFICANT CHANGE UP (ref 40–120)
ALT FLD-CCNC: 11 U/L — SIGNIFICANT CHANGE UP
ANION GAP SERPL CALC-SCNC: 10 MMOL/L — SIGNIFICANT CHANGE UP (ref 5–17)
AST SERPL-CCNC: 24 U/L — SIGNIFICANT CHANGE UP
BASOPHILS # BLD AUTO: 0.07 K/UL — SIGNIFICANT CHANGE UP (ref 0–0.2)
BASOPHILS NFR BLD AUTO: 1 % — SIGNIFICANT CHANGE UP (ref 0–2)
BILIRUB SERPL-MCNC: 0.5 MG/DL — SIGNIFICANT CHANGE UP (ref 0.4–2)
BUN SERPL-MCNC: 25.1 MG/DL — HIGH (ref 8–20)
CALCIUM SERPL-MCNC: 11.4 MG/DL — HIGH (ref 8.4–10.5)
CHLORIDE SERPL-SCNC: 107 MMOL/L — SIGNIFICANT CHANGE UP (ref 96–108)
CO2 SERPL-SCNC: 25 MMOL/L — SIGNIFICANT CHANGE UP (ref 22–29)
CREAT SERPL-MCNC: 0.85 MG/DL — SIGNIFICANT CHANGE UP (ref 0.5–1.3)
EGFR: 65 ML/MIN/1.73M2 — SIGNIFICANT CHANGE UP
EOSINOPHIL # BLD AUTO: 0.1 K/UL — SIGNIFICANT CHANGE UP (ref 0–0.5)
EOSINOPHIL NFR BLD AUTO: 1.5 % — SIGNIFICANT CHANGE UP (ref 0–6)
GLUCOSE SERPL-MCNC: 94 MG/DL — SIGNIFICANT CHANGE UP (ref 70–99)
HCT VFR BLD CALC: 36.1 % — SIGNIFICANT CHANGE UP (ref 34.5–45)
HGB BLD-MCNC: 11.6 G/DL — SIGNIFICANT CHANGE UP (ref 11.5–15.5)
IMM GRANULOCYTES NFR BLD AUTO: 0.6 % — SIGNIFICANT CHANGE UP (ref 0–0.9)
LYMPHOCYTES # BLD AUTO: 1.2 K/UL — SIGNIFICANT CHANGE UP (ref 1–3.3)
LYMPHOCYTES # BLD AUTO: 17.5 % — SIGNIFICANT CHANGE UP (ref 13–44)
MCHC RBC-ENTMCNC: 30.8 PG — SIGNIFICANT CHANGE UP (ref 27–34)
MCHC RBC-ENTMCNC: 32.1 GM/DL — SIGNIFICANT CHANGE UP (ref 32–36)
MCV RBC AUTO: 95.8 FL — SIGNIFICANT CHANGE UP (ref 80–100)
MONOCYTES # BLD AUTO: 0.58 K/UL — SIGNIFICANT CHANGE UP (ref 0–0.9)
MONOCYTES NFR BLD AUTO: 8.5 % — SIGNIFICANT CHANGE UP (ref 2–14)
NEUTROPHILS # BLD AUTO: 4.86 K/UL — SIGNIFICANT CHANGE UP (ref 1.8–7.4)
NEUTROPHILS NFR BLD AUTO: 70.9 % — SIGNIFICANT CHANGE UP (ref 43–77)
NT-PROBNP SERPL-SCNC: 1627 PG/ML — HIGH (ref 0–300)
PLATELET # BLD AUTO: 151 K/UL — SIGNIFICANT CHANGE UP (ref 150–400)
POTASSIUM SERPL-MCNC: 4.8 MMOL/L — SIGNIFICANT CHANGE UP (ref 3.5–5.3)
POTASSIUM SERPL-SCNC: 4.8 MMOL/L — SIGNIFICANT CHANGE UP (ref 3.5–5.3)
PROT SERPL-MCNC: 7.1 G/DL — SIGNIFICANT CHANGE UP (ref 6.6–8.7)
RBC # BLD: 3.77 M/UL — LOW (ref 3.8–5.2)
RBC # FLD: 17.1 % — HIGH (ref 10.3–14.5)
SODIUM SERPL-SCNC: 142 MMOL/L — SIGNIFICANT CHANGE UP (ref 135–145)
WBC # BLD: 6.85 K/UL — SIGNIFICANT CHANGE UP (ref 3.8–10.5)
WBC # FLD AUTO: 6.85 K/UL — SIGNIFICANT CHANGE UP (ref 3.8–10.5)

## 2024-05-09 PROCEDURE — 99223 1ST HOSP IP/OBS HIGH 75: CPT

## 2024-05-09 PROCEDURE — 71045 X-RAY EXAM CHEST 1 VIEW: CPT | Mod: 26

## 2024-05-09 PROCEDURE — 99222 1ST HOSP IP/OBS MODERATE 55: CPT

## 2024-05-09 PROCEDURE — 99285 EMERGENCY DEPT VISIT HI MDM: CPT

## 2024-05-09 RX ORDER — LANOLIN ALCOHOL/MO/W.PET/CERES
3 CREAM (GRAM) TOPICAL AT BEDTIME
Refills: 0 | Status: DISCONTINUED | OUTPATIENT
Start: 2024-05-09 | End: 2024-05-14

## 2024-05-09 RX ORDER — ASPIRIN/CALCIUM CARB/MAGNESIUM 324 MG
325 TABLET ORAL DAILY
Refills: 0 | Status: DISCONTINUED | OUTPATIENT
Start: 2024-05-09 | End: 2024-05-10

## 2024-05-09 RX ORDER — ATORVASTATIN CALCIUM 80 MG/1
10 TABLET, FILM COATED ORAL AT BEDTIME
Refills: 0 | Status: DISCONTINUED | OUTPATIENT
Start: 2024-05-09 | End: 2024-05-14

## 2024-05-09 RX ORDER — FUROSEMIDE 40 MG
40 TABLET ORAL ONCE
Refills: 0 | Status: DISCONTINUED | OUTPATIENT
Start: 2024-05-10 | End: 2024-05-11

## 2024-05-09 RX ORDER — CEFTRIAXONE 500 MG/1
0 INJECTION, POWDER, FOR SOLUTION INTRAMUSCULAR; INTRAVENOUS
Qty: 0 | Refills: 0 | DISCHARGE

## 2024-05-09 RX ORDER — FUROSEMIDE 40 MG
40 TABLET ORAL DAILY
Refills: 0 | Status: DISCONTINUED | OUTPATIENT
Start: 2024-05-10 | End: 2024-05-12

## 2024-05-09 RX ORDER — AMLODIPINE BESYLATE 2.5 MG/1
5 TABLET ORAL DAILY
Refills: 0 | Status: DISCONTINUED | OUTPATIENT
Start: 2024-05-09 | End: 2024-05-14

## 2024-05-09 RX ORDER — LEVOTHYROXINE SODIUM 125 MCG
88 TABLET ORAL DAILY
Refills: 0 | Status: DISCONTINUED | OUTPATIENT
Start: 2024-05-09 | End: 2024-05-10

## 2024-05-09 RX ORDER — SACCHAROMYCES BOULARDII 250 MG
250 POWDER IN PACKET (EA) ORAL
Refills: 0 | Status: DISCONTINUED | OUTPATIENT
Start: 2024-05-09 | End: 2024-05-14

## 2024-05-09 RX ORDER — SPIRONOLACTONE 25 MG/1
25 TABLET, FILM COATED ORAL DAILY
Refills: 0 | Status: DISCONTINUED | OUTPATIENT
Start: 2024-05-09 | End: 2024-05-14

## 2024-05-09 RX ORDER — ACETAMINOPHEN 500 MG
650 TABLET ORAL EVERY 6 HOURS
Refills: 0 | Status: DISCONTINUED | OUTPATIENT
Start: 2024-05-09 | End: 2024-05-14

## 2024-05-09 RX ORDER — ENOXAPARIN SODIUM 100 MG/ML
40 INJECTION SUBCUTANEOUS EVERY 24 HOURS
Refills: 0 | Status: DISCONTINUED | OUTPATIENT
Start: 2024-05-09 | End: 2024-05-14

## 2024-05-09 RX ADMIN — SPIRONOLACTONE 25 MILLIGRAM(S): 25 TABLET, FILM COATED ORAL at 19:18

## 2024-05-09 RX ADMIN — ATORVASTATIN CALCIUM 10 MILLIGRAM(S): 80 TABLET, FILM COATED ORAL at 22:52

## 2024-05-09 NOTE — H&P ADULT - NSHPREVIEWOFSYSTEMS_GEN_ALL_CORE
Constitutional: denies fever, chills, diaphoresis   HEENT: denies blurry vision, difficulty hearing  Respiratory: denies SOB, SHORE, cough, sputum production, wheezing, hemoptysis  Cardiovascular: denies CP, palpitations, edema  Gastrointestinal: denies nausea, vomiting, diarrhea, constipation, abdominal pain, melena, hematochezia   Genitourinary: denies dysuria, frequency, urgency, hematuria   Skin/Breast: denies rash, itching  Musculoskeletal: denies myalgias, joint swelling, muscle weakness  Neurologic: denies headache, weakness, dizziness, paresthesias, numbness/tingling  Psychiatric: denies feeling anxious, depressed, suicidal, homicidal thoughts  Hematology/Oncology: denies bruising, tender or enlarged lymph nodes   ROS negative except as noted above

## 2024-05-09 NOTE — ED ADULT NURSE NOTE - NSFALLHARMRISKINTERV_ED_ALL_ED
Assistance OOB with selected safe patient handling equipment if applicable/Communicate risk of Fall with Harm to all staff, patient, and family/Monitor gait and stability/Provide patient with walking aids/Provide visual cue: red socks, yellow wristband, yellow gown, etc/Reinforce activity limits and safety measures with patient and family/Bed in lowest position, wheels locked, appropriate side rails in place/Call bell, personal items and telephone in reach/Instruct patient to call for assistance before getting out of bed/chair/stretcher/Non-slip footwear applied when patient is off stretcher/[place] to call system/Physically safe environment - no spills, clutter or unnecessary equipment/Purposeful Proactive Rounding/Room/bathroom lighting operational, light cord in reach

## 2024-05-09 NOTE — CONSULT NOTE ADULT - SUBJECTIVE AND OBJECTIVE BOX
HPI: 89F with PMHx of HTN, bifascicular block, paroxysmal Afib not on anticoagulation, endocarditis, and previous diastolic heart failure (2L NC at home), previous pleural effusions, hypothyroidism, HLD, and C. diff. BIBA from [facility] for worsening pleural effusions and was given IV Lasix 40 mg at around 11:09 AM today. She was recently hospitalized at [facility] in 2024 with PNA, bilateral pleural effusions and CHF. Daughter and son at bedside and stated that earlier today while at the nursing home they were concerned she had labored breathing and suggested she go to her cardiologist. States that over the last couple days she has been more tired and less interactive than her baseline. She is very [place] but is able to read lips.     CT surgery consulted for right pleural effusion seen on CXR. Patient seen at bedside and appears to be improved from earlier. Patient is awake and alert and following commands, smiling at family and writer. Currently on 2L NC in no distress. Family states that in the past she has had a left PlurX.      Unable to obtain ROS due to patient [place].       PAST MEDICAL & SURGICAL HISTORY:  Hypothyroid      Hypercholesterolemia      Hypertension      Near syncope      Endocarditis      Pneumonia      No significant past surgical history          MEDICATIONS  (STANDING):  spironolactone 25 milliGRAM(s) Oral daily    MEDICATIONS  (PRN):      Allergies    penicillins (Rash)    Intolerances        SOCIAL HISTORY:  Lives in [facility]  Does not walk, uses denise lift to get out of bed and transfer       FAMILY HISTORY:  FH: heart disease  father  when she was  9 , does not remember his exact age when     FH: stroke  her mother  at age 70's        Vital Signs Last 24 Hrs  T(C): 36.4 (09 May 2024 16:28), Max: 36.4 (09 May 2024 12:03)  T(F): 97.5 (09 May 2024 16:28), Max: 97.6 (09 May 2024 12:03)  HR: 68 (09 May 2024 16:28) (64 - 68)  BP: 169/81 (09 May 2024 16:28) (147/70 - 169/81)  BP(mean): --  RR: 18 (09 May 2024 16:28) (18 - 18)  SpO2: 99% (09 May 2024 16:28) (99% - 99%)    Parameters below as of 09 May 2024 16:28  Patient On (Oxygen Delivery Method): room air      PHYSICAL EXAM:  GENERAL: No acute distress, frail, smiling   NECK: no JVD  CHEST/LUNG: Decreased breath sounds throughout. CTAB; No wheezes, rales, or rhonchi  HEART: RRR; No murmurs, rubs, or gallops  ABDOMEN: Soft, non-tender, non-distended; normal bowel sounds, no organomegaly      LABS:                        11.6   6.85  )-----------( 151      ( 09 May 2024 14:30 )             36.1         142  |  107  |  25.1<H>  ----------------------------<  94  4.8   |  25.0  |  0.85    Ca    11.4<H>      09 May 2024 14:30    TPro  7.1  /  Alb  3.0<L>  /  TBili  0.5  /  DBili  x   /  AST  24  /  ALT  11  /  AlkPhos  104        Urinalysis Basic - ( 09 May 2024 14:30 )    Color: x / Appearance: x / SG: x / pH: x  Gluc: 94 mg/dL / Ketone: x  / Bili: x / Urobili: x   Blood: x / Protein: x / Nitrite: x   Leuk Esterase: x / RBC: x / WBC x   Sq Epi: x / Non Sq Epi: x / Bacteria: x        RADIOLOGY & ADDITIONAL STUDIES:    CXR: < from: Xray Chest 1 View- PORTABLE-Urgent (Xray Chest 1 View- PORTABLE-Urgent .) (24 @ 14:41) >  IMPRESSION:  Findings suggesting pulmonary edema. Infection is not   excluded in the appropriate clinical context, however.    A small to moderate size partially loculated right pleural effusion   appears larger. There is likely associated passive atelectasis.    < end of copied text >

## 2024-05-09 NOTE — ED PROVIDER NOTE - CLINICAL SUMMARY MEDICAL DECISION MAKING FREE TEXT BOX
ASSESSMENT: 89F with PMHx of HTN, CHF (LVEF 55-60%), hypothyroidism, pleural effusions (NC 2L baseline), HLD, endocarditis, C. diff BIBA from [facility] for worsening pleural effusions. Etiology likely CHF exacerbation vs community-acquired pneumonia.    PLAN:  - Telemetry monitoring  - Supplemental oxygen as needed, wean as tolerated  - CBC  - CMP  - Pro-BNP  - CXR  - EKG  - Cardiology consulted, recs pending

## 2024-05-09 NOTE — ED PROVIDER NOTE - PROGRESS NOTE DETAILS
Continuing to use baseline 2-3L NC  CXR with R pleural effusion, consulted thoracic surgery  Cardiology recs noted, added IV lasix and aldactone  Admit to medicine for acute on chronic diastolic heart failure and pleural effusion  Thoracic surgery recs pending

## 2024-05-09 NOTE — H&P ADULT - HISTORY OF PRESENT ILLNESS
89 y.o female with a past medical history of HTN, bifascicular block, paroxysmal Afib not on anticoagulation, endocarditis, and HFpEF. S/p recent hospitalization at [facility] in March 2024 with PNA, bilateral pleural effusions and CHF. Patient is a poor historian history obtained from ER provider and chart. As per daughter patient is becoming increasingly fatigued, unresponsive, and short of breath. She was placed on 3L NC few months prior. Went to Cardiologist office today and was referred to ER for suspicion of pleural effusion.     Patient is hard of hearing and unable to speak loudly, she denies dyspnea presently,   S/p IV lasix, on NC 3LPM sat 97%

## 2024-05-09 NOTE — H&P ADULT - ASSESSMENT
89 y.o female with a past medical history of Hypothyroid,  HTN, bifascicular block, paroxysmal Afib not on anticoagulation, endocarditis, and HFpEF admitted with pleural effusion, hfpef exacerbation    #Pleural effusion  #Acute on chronic HFpEF exacerbation  Admit to medicine  CXR sm to mod partially R loculated pl effusion  Probnp 1627  S/p IV lasix 40mg x 1  Cardiology eval in ER noted- c/w iv lasix, added aldactone  CT surg for consideration of thoracentesis  [facility]    #HTN  Amlodipine    #HLD  c/w statin    #pAfib  EKG nsr, RBB  pta   Not on AC presumably fall risk    #Hypothyroid  c/w pta synthroid  Check TSH in AM    #DVTppx: Lovenox  GI ppx: Diet  Fall precautions  PT and pall eval 89 y.o female with a past medical history of Hypothyroid,  HTN, bifascicular block, paroxysmal Afib not on anticoagulation, endocarditis, and HFpEF admitted with pleural effusion, hfpef exacerbation    #Pleural effusion  #Acute on chronic HFpEF exacerbation  Admit to medicine  CXR sm to mod partially R loculated pl effusion  Probnp 1627  S/p IV lasix 40mg x 1  Cardiology eval in ER noted- c/w iv lasix, added aldactone  CT surg for consideration of thoracentesis  Follow up AM CXR  [facility]    #HTN  Amlodipine    #HLD  c/w statin    #pAfib  EKG nsr, RBB  pta   Not on AC presumably fall risk    #Hypothyroid  c/w pta synthroid  Check TSH in AM    #DVTppx: Lovenox  GI ppx: Diet  Fall precautions  PT and pall eval

## 2024-05-09 NOTE — ED PROVIDER NOTE - NSICDXPASTMEDICALHX_GEN_ALL_CORE_FT
PAST MEDICAL HISTORY:  Endocarditis     Hypercholesterolemia     Hypertension     Hypothyroid     Near syncope     Pneumonia

## 2024-05-09 NOTE — H&P ADULT - NSHPPHYSICALEXAM_GEN_ALL_CORE
Physical Exam:  General: Elderly, frail   HEENT: +[place], +NC  Neck: Supple, nontender, no mass  Neurology: A&Ox1-2   Respiratory: Diminished air movement bl  CV: RRR, +S1/S2, no murmurs, rubs or gallops  Abdominal: Soft, NT, ND +BSx4  Extremities: No C/C/E, + peripheral pulses  MSK: Normal ROM, no joint erythema or warmth, no joint swelling   Skin: warm, dry, normal color, no rash or abnormal lesions

## 2024-05-09 NOTE — CONSULT NOTE ADULT - PROBLEM SELECTOR RECOMMENDATION 9
- CXR reviewed  - Spoke with patient's daughter and son and cardiology   - Currently in agreement with plan to monitor effusion and treat medically with diuresis   - If effusion worsens or clinical status changes, thoracic surgery to consider further intervention  - Case discussed with Dr. Oliveira   - Thoracic surgery to continue to follow   - Rest of care per primary team

## 2024-05-09 NOTE — ED ADULT NURSE NOTE - CHIEF COMPLAINT QUOTE
BIBA from [facility] for worsening pleural effusions. PMH CHF. 22G placed L FA, given 40 mg lasix IV at 1109 AM. RR even and unlabored on 3 L NC.

## 2024-05-09 NOTE — ED ADULT NURSE NOTE - OBJECTIVE STATEMENT
Pt hard of hearing; permission obtained to speak with adult daughter.  Pt sent by PCP for SOB at provider's office. Airway patent. Respirations labored. Chronically on 2LNC. +2 RLE edema. +3LLE edema. Denies chest pain. Pt on telebox. Purewick in place. Family at bedside. Bed locked and in lowest position. Frequent checks made.

## 2024-05-09 NOTE — ED ADULT TRIAGE NOTE - CHIEF COMPLAINT QUOTE
BIBA from [facility] for worsening pleural effusions. PMH CHF. 22G placed L FA, given 40 mg lasix IV at 1109 AM. RR even and unlabored on 3 L NC.

## 2024-05-09 NOTE — ED PROVIDER NOTE - CARE PLAN
Principal Discharge DX:	Pleural effusion  Secondary Diagnosis:	Acute on chronic diastolic congestive heart failure   1

## 2024-05-09 NOTE — CONSULT NOTE ADULT - SUBJECTIVE AND OBJECTIVE BOX
[facility], [facility]                              [address] [place] [address]                                                 PHONE: [phone]                                                 FAX: [phone]  ------------------------------------------------------------------------------------------------    Chief complaint: lethargy    89 y.o female with a past medical history of HTN, bifascicular block, paroxysmal Afib not on anticoagulation, endocarditis, and previous diastolic heart failure. She was recently hospitalized at [facility] in March 2024 with PNA, bilateral pleural effusions and CHF. Her daughter states she has been very unresponsive recently. She is very fatigued and weak. She is now on supplemental O2 and significantly short of breath. SpO2 reading is 97% on 3L O2. She was seen in the office and referred to the ED.  At the time of evaluation, pt is more awake, alert, responsive, smiling although she is extremely [place]. She received iv lasix with good response and is back to her baseline O2 of 2-3 L.    PAST MEDICAL & SURGICAL HISTORY:  Hypothyroid      Hypercholesterolemia      Hypertension      Near syncope      Endocarditis      Pneumonia      No significant past surgical history        penicillins (Rash)    Vital Signs Last 24 Hrs  T(C): 36.4 (09 May 2024 16:28), Max: 36.4 (09 May 2024 12:03)  T(F): 97.5 (09 May 2024 16:28), Max: 97.6 (09 May 2024 12:03)  HR: 68 (09 May 2024 16:28) (64 - 68)  BP: 169/81 (09 May 2024 16:28) (147/70 - 169/81)  BP(mean): --  RR: 18 (09 May 2024 16:28) (18 - 18)  SpO2: 99% (09 May 2024 16:28) (99% - 99%)    Parameters below as of 09 May 2024 16:28  Patient On (Oxygen Delivery Method): room air      RELEVANT PHYSICAL EXAM:  Neck: No obvious JVD  Cardiovascular: regular S1, S2  Respiratory: decreased BS at both bases  Musculoskeletal: Trace edema    LABS:                        11.6   6.85  )-----------( 151      ( 09 May 2024 14:30 )             36.1     05-09    142  |  107  |  25.1<H>  ----------------------------<  94  4.8   |  25.0  |  0.85    Ca    11.4<H>      09 May 2024 14:30    TPro  7.1  /  Alb  3.0<L>  /  TBili  0.5  /  DBili  x   /  AST  24  /  ALT  11  /  AlkPhos  104  05-09    RADIOLOGY & ADDITIONAL STUDIES: (reviewed)  CXR was independently visualized/reviewed and demonstrated: L pleural effusion    CARDIOLOGY TESTING:(reviewed)   ECG (Independent visualization): NSR, RBBB, low voltage, ST-T wave abnormality    ECHOCARDIOGRAM : 9/2023  1. Left ventricle: The cavity size is normal. Mildly increased thickness is present. There are no regional wall motion  abnormalities present. Left ventricular ejection fraction is 65-70%. The left ventricular filling pattern is abnormal from  impaired relaxation. Diastolic dysfunction is present.  2. Right ventricle: The cavity size is normal. Right ventricular systolic function is normal.  3. Mitral valve: The annulus is moderately calcified and posteriorly. The leaflets are mildly calcified. There is moderate  (2+) mitral valve regurgitation. The mean diastolic gradient is 5.0 mm Hg. The peak diastolic gradient is 9.0 mm Hg.  4. Aortic valve: The valve is trileaflet. The leaflets are mildly calcified. There is no aortic stenosis. There is mild (1+)  valvular aortic regurgitation. The peak systolic gradient is 6.0 mm Hg. The mean systolic gradient is 3.0 mm Hg.  5. Tricuspid valve: The tricuspid leaflets are not thickened. The tricuspid valve insufficiency was not well visualized.  6. Pericardium, extracardiac: There is no pericardial effusion.  Findings    TELEMETRY independently visualized/reviewed and demonstrated : NSR    MEDICATIONS:(reviewed)  Home Medications:  •	acetaminophen (TYLENOL) 325 MG tablet	Take 650 mg by mouth as needed for Pain..  •	ALBUTEROL IN	Inhale into the lungs as needed..  •	aspirin 81 MG EC tablet	Take 81 mg by mouth one time daily. .  •	atorvastatin (LIPITOR) 10 MG tablet	Take 1 tablet (10 mg total) by mouth one time daily..  •	bisacodyl (DULCOLAX) 5 MG EC tablet	Take 5 mg by mouth daily as needed for Constipation..  •	Cholecalciferol (D3 PO)	Take by mouth..  •	cinacalcet (SENSIPAR) 30 MG tablet	Take 30 mg by mouth in the morning and 30 mg in the evening.  •	folic acid (FOLVITE) 1 MG tablet	Take 1 mg by mouth one time daily..  •	Levothyroxine Sodium (SYNTHROID PO)	Take 112 mcg by mouth one time daily..  •	magnesium hydroxide (MILK OF MAGNESIA) 400 MG/5ML suspension	Take by mouth daily as needed for Constipation..  •	mirtazapine (REMERON) 15 MG tablet	Take 15 mg by mouth nightly..  •	NYAMYC powder	   •	Polyvinyl Alcohol-Povidone (REFRESH OP)	Apply to eye..  •	torsemide (DEMADEX) 20 MG tablet	Take 1.5 tablets (30 mg total) by mouth one time daily.. (Patient taking differently: Take 10 mg by mouth every other day..)  •	vitamin A & D ointment	Apply topically as needed for Dry Skin..    MEDICATIONS  (STANDING):    MEDICATIONS  (PRN):      ASSESSMENT AND PLAN:    89 y.o female with a past medical history of HTN, bifascicular block, paroxysmal Afib not on anticoagulation, endocarditis, and previous diastolic heart failure, recent admission for HFpEF who was sent in for weakness, lethargy and is now improved on iv lasix.  Noted to have R sided pleural effusion as well    Will need hospital admission for acute on chronic diastolic HF and R pleural effuion  Has responded to iv lasix and would continue with diuresis to assess response with O2 status as well as pleural effusion  Add low dose aldactone  Plan would be for consideration of thoracentesis if no significant improvement in pleural effusion.     Plan d/w thoracic surgery    Additional history obtained from family [independent historian] and plan of care discussed at bedside      60 minutes of total time dedicated to this patient visit today including preparing to see the patient (eg. Review of tests) obtaining and/or reviewing separately obtained history obtaining/ reviewing vitals, performing a medically appropriate examination counseling, communication and educating the family reviewing previous notes and test results, and procedures communicating with other health professionals, documenting clinical information, in the electronic or other health records  independently interpreting results

## 2024-05-09 NOTE — ED PROVIDER NOTE - OBJECTIVE STATEMENT
89F with PMHx of CHF (LVEF 55-60%, on 2L NC at baseline), HTN, pleural effusions, hypothyroidism, HLD, endocarditis, C. diff BIBA from [facility] for worsening pleural effusions and was given IV Lasix 40 mg at around 11:09 AM today. Patient is hard of hearing so history obtained from son at bedside; states that patient went for routine cardiology appointment when pt was advised to visit ED. Patient was not reporting any SOB, has been using her baseline 2L NC that she was unable to wean off of since having pneumonia in March 2024. 89F with PMHx of CHF (LVEF 55-60%, on 2L NC at baseline), HTN, pleural effusions, hypothyroidism, HLD, endocarditis, C. diff BIBA from [facility] for worsening pleural effusions and was given IV Lasix 40 mg at around 11:09 AM today. Patient is hard of hearing so history obtained from son at bedside; states that patient went for routine cardiology appointment when pt was advised to visit ED as cardiologist had high suspicion of worsening pleural effusions. Per son, patient was not reporting any SOB, has been using her baseline 2L NC that she was unable to wean off of since having pneumonia in March 2024.

## 2024-05-09 NOTE — ED PROVIDER NOTE - ATTENDING CONTRIBUTION TO CARE
89F with PMHx of HTN, CHF (LVEF 55-60%), hypothyroidism, pleural effusions (NC 2L baseline), HLD, endocarditis, C. diff BIBA from [facility] for worsening pleural effusions. Etiology likely CHF exacerbation vs community-acquired pneumonia.    PLAN:  - Telemetry monitoring  - Supplemental oxygen as needed, wean as tolerated  - CBC  - CMP  - Pro-BNP  - CXR

## 2024-05-10 LAB
ANION GAP SERPL CALC-SCNC: 10 MMOL/L — SIGNIFICANT CHANGE UP (ref 5–17)
ANION GAP SERPL CALC-SCNC: 11 MMOL/L — SIGNIFICANT CHANGE UP (ref 5–17)
BASOPHILS # BLD AUTO: 0.04 K/UL — SIGNIFICANT CHANGE UP (ref 0–0.2)
BASOPHILS NFR BLD AUTO: 0.6 % — SIGNIFICANT CHANGE UP (ref 0–2)
BUN SERPL-MCNC: 24.2 MG/DL — HIGH (ref 8–20)
BUN SERPL-MCNC: 24.4 MG/DL — HIGH (ref 8–20)
CALCIUM SERPL-MCNC: 10.9 MG/DL — HIGH (ref 8.4–10.5)
CALCIUM SERPL-MCNC: 10.9 MG/DL — HIGH (ref 8.4–10.5)
CHLORIDE SERPL-SCNC: 102 MMOL/L — SIGNIFICANT CHANGE UP (ref 96–108)
CHLORIDE SERPL-SCNC: 103 MMOL/L — SIGNIFICANT CHANGE UP (ref 96–108)
CO2 SERPL-SCNC: 25 MMOL/L — SIGNIFICANT CHANGE UP (ref 22–29)
CO2 SERPL-SCNC: 27 MMOL/L — SIGNIFICANT CHANGE UP (ref 22–29)
CREAT SERPL-MCNC: 0.85 MG/DL — SIGNIFICANT CHANGE UP (ref 0.5–1.3)
CREAT SERPL-MCNC: 0.91 MG/DL — SIGNIFICANT CHANGE UP (ref 0.5–1.3)
EGFR: 60 ML/MIN/1.73M2 — SIGNIFICANT CHANGE UP
EGFR: 65 ML/MIN/1.73M2 — SIGNIFICANT CHANGE UP
EOSINOPHIL # BLD AUTO: 0.2 K/UL — SIGNIFICANT CHANGE UP (ref 0–0.5)
EOSINOPHIL NFR BLD AUTO: 3.1 % — SIGNIFICANT CHANGE UP (ref 0–6)
GLUCOSE SERPL-MCNC: 101 MG/DL — HIGH (ref 70–99)
GLUCOSE SERPL-MCNC: 76 MG/DL — SIGNIFICANT CHANGE UP (ref 70–99)
HCT VFR BLD CALC: 30.7 % — LOW (ref 34.5–45)
HGB BLD-MCNC: 10.2 G/DL — LOW (ref 11.5–15.5)
IMM GRANULOCYTES NFR BLD AUTO: 0.3 % — SIGNIFICANT CHANGE UP (ref 0–0.9)
LYMPHOCYTES # BLD AUTO: 1.29 K/UL — SIGNIFICANT CHANGE UP (ref 1–3.3)
LYMPHOCYTES # BLD AUTO: 20 % — SIGNIFICANT CHANGE UP (ref 13–44)
MAGNESIUM SERPL-MCNC: 1.8 MG/DL — SIGNIFICANT CHANGE UP (ref 1.6–2.6)
MAGNESIUM SERPL-MCNC: 1.8 MG/DL — SIGNIFICANT CHANGE UP (ref 1.6–2.6)
MCHC RBC-ENTMCNC: 31.9 PG — SIGNIFICANT CHANGE UP (ref 27–34)
MCHC RBC-ENTMCNC: 33.2 GM/DL — SIGNIFICANT CHANGE UP (ref 32–36)
MCV RBC AUTO: 95.9 FL — SIGNIFICANT CHANGE UP (ref 80–100)
MONOCYTES # BLD AUTO: 0.68 K/UL — SIGNIFICANT CHANGE UP (ref 0–0.9)
MONOCYTES NFR BLD AUTO: 10.6 % — SIGNIFICANT CHANGE UP (ref 2–14)
NEUTROPHILS # BLD AUTO: 4.21 K/UL — SIGNIFICANT CHANGE UP (ref 1.8–7.4)
NEUTROPHILS NFR BLD AUTO: 65.4 % — SIGNIFICANT CHANGE UP (ref 43–77)
NT-PROBNP SERPL-SCNC: 1968 PG/ML — HIGH (ref 0–300)
PLATELET # BLD AUTO: 124 K/UL — LOW (ref 150–400)
POTASSIUM SERPL-MCNC: 3.8 MMOL/L — SIGNIFICANT CHANGE UP (ref 3.5–5.3)
POTASSIUM SERPL-MCNC: 3.8 MMOL/L — SIGNIFICANT CHANGE UP (ref 3.5–5.3)
POTASSIUM SERPL-SCNC: 3.8 MMOL/L — SIGNIFICANT CHANGE UP (ref 3.5–5.3)
POTASSIUM SERPL-SCNC: 3.8 MMOL/L — SIGNIFICANT CHANGE UP (ref 3.5–5.3)
RBC # BLD: 3.2 M/UL — LOW (ref 3.8–5.2)
RBC # FLD: 16.9 % — HIGH (ref 10.3–14.5)
SODIUM SERPL-SCNC: 138 MMOL/L — SIGNIFICANT CHANGE UP (ref 135–145)
SODIUM SERPL-SCNC: 140 MMOL/L — SIGNIFICANT CHANGE UP (ref 135–145)
TSH SERPL-MCNC: 12.87 UIU/ML — HIGH (ref 0.27–4.2)
WBC # BLD: 6.44 K/UL — SIGNIFICANT CHANGE UP (ref 3.8–10.5)
WBC # FLD AUTO: 6.44 K/UL — SIGNIFICANT CHANGE UP (ref 3.8–10.5)

## 2024-05-10 PROCEDURE — 99222 1ST HOSP IP/OBS MODERATE 55: CPT

## 2024-05-10 PROCEDURE — 99233 SBSQ HOSP IP/OBS HIGH 50: CPT

## 2024-05-10 PROCEDURE — 71045 X-RAY EXAM CHEST 1 VIEW: CPT | Mod: 26

## 2024-05-10 PROCEDURE — 99231 SBSQ HOSP IP/OBS SF/LOW 25: CPT

## 2024-05-10 RX ORDER — ASPIRIN/CALCIUM CARB/MAGNESIUM 324 MG
81 TABLET ORAL DAILY
Refills: 0 | Status: DISCONTINUED | OUTPATIENT
Start: 2024-05-10 | End: 2024-05-14

## 2024-05-10 RX ORDER — LEVOTHYROXINE SODIUM 125 MCG
112 TABLET ORAL DAILY
Refills: 0 | Status: DISCONTINUED | OUTPATIENT
Start: 2024-05-10 | End: 2024-05-10

## 2024-05-10 RX ORDER — LEVOTHYROXINE SODIUM 125 MCG
137 TABLET ORAL DAILY
Refills: 0 | Status: DISCONTINUED | OUTPATIENT
Start: 2024-05-10 | End: 2024-05-14

## 2024-05-10 RX ORDER — ASPIRIN/CALCIUM CARB/MAGNESIUM 324 MG
1 TABLET ORAL
Refills: 0 | DISCHARGE

## 2024-05-10 RX ADMIN — Medication 88 MICROGRAM(S): at 06:00

## 2024-05-10 RX ADMIN — Medication 250 MILLIGRAM(S): at 06:00

## 2024-05-10 RX ADMIN — Medication 325 MILLIGRAM(S): at 12:12

## 2024-05-10 RX ADMIN — SPIRONOLACTONE 25 MILLIGRAM(S): 25 TABLET, FILM COATED ORAL at 06:00

## 2024-05-10 RX ADMIN — ATORVASTATIN CALCIUM 10 MILLIGRAM(S): 80 TABLET, FILM COATED ORAL at 22:22

## 2024-05-10 RX ADMIN — AMLODIPINE BESYLATE 5 MILLIGRAM(S): 2.5 TABLET ORAL at 06:00

## 2024-05-10 RX ADMIN — Medication 250 MILLIGRAM(S): at 18:25

## 2024-05-10 RX ADMIN — Medication 40 MILLIGRAM(S): at 06:01

## 2024-05-10 RX ADMIN — ENOXAPARIN SODIUM 40 MILLIGRAM(S): 100 INJECTION SUBCUTANEOUS at 12:13

## 2024-05-10 NOTE — PROGRESS NOTE ADULT - PROBLEM SELECTOR PLAN 1
- Patient remains stable, not on increasing O2 demands, in NAD  - Family in agreement with plan to monitor effusion and treat medically with diuresis   - If effusion worsens or clinical status changes, thoracic surgery to consider further intervention  - Case discussed with Dr. Oliveira   - Thoracic surgery to continue to follow   - Rest of care per primary team

## 2024-05-10 NOTE — CONSULT NOTE ADULT - ASSESSMENT
89F with PMHx of HTN, bifascicular block, paroxysmal Afib not on anticoagulation, endocarditis, and previous diastolic heart failure (2L NC at home), previous pleural effusions, hypothyroidism, HLD, and C. diff. BIBA from [facility] for worsening pleural effusions and was given IV Lasix 40 mg at around 11:09 AM today. She was recently hospitalized at [facility] in March 2024 with PNA, bilateral pleural effusions and CHF. Daughter and son at bedside and stated that earlier today while at the nursing home they were concerned she had labored breathing and suggested she go to her cardiologist. States that over the last couple days she has been more tired and less interactive than her baseline. She is very [place] but is able to read lips.     CT surgery consulted for right pleural effusion seen on CXR. Patient seen at bedside and appears to be improved from earlier. Patient is awake and alert and following commands, smiling at family and writer. Currently on 2L NC in no distress. 
89 yo female with a history of HTN, bifascicular block, paroxysmal Afib not on anticoagulation, endocarditis, and HFpEF, s/p recent hospitalization at [facility] in March 2024 with PNA, bilateral pleural effusions, CHF, sent in from cardiologist's office with suspicion of pleural effusion. Admitted for management of HFpEF, pleural effusion.  Palliative care consulted for goals of care.    Low symptom burden at this time.    # acute on chronic HFpEF, pleural effusion:  - imaging reviewed  - cardiology, thoracic surgery follow  - on IV furosemide  - no plans for surgical intervention at this time for pleural effusion    # fatigue:  - HFpEF exacerbation likely contributor, allow for rest    # frailty:  - supportive care, assist as needed    # palliative care   - no family at bedside  - no HCP on file: children appear to be equal surrogates, rec clarification as to whether pt assigned HCP. If one was assigned, rec documentation be submitted.  - full code orders  - per hospitalist, conversation initiated re: code status, ongoing hospitalizations.  - palliative care will outreach to family to explore family's thoughts re: GOC (conversation initiated today by hospitalist)    Patient reviewed with hospitalist.  Thank you for involving us in the care of this patient.

## 2024-05-10 NOTE — PATIENT PROFILE ADULT - NSPROPTRIGHTCAREGIVER_GEN_A_NUR
Last office visit was on 11/27/2017 patient needs an appointment for any further refills     No up coming appointments made     Last refill was on 11/28/2017  
no

## 2024-05-10 NOTE — PATIENT PROFILE ADULT - FALL HARM RISK - HARM RISK INTERVENTIONS
Communicate Risk of Fall with Harm to all staff/Reinforce activity limits and safety measures with patient and family/Tailored Fall Risk Interventions/Visual Cue: Yellow wristband and red socks/Bed in lowest position, wheels locked, appropriate side rails in place/Call bell, personal items and telephone in reach/Instruct patient to call for assistance before getting out of bed or chair/Non-slip footwear when patient is out of bed/[place] to call system/Physically safe environment - no spills, clutter or unnecessary equipment/Purposeful Proactive Rounding/Room/bathroom lighting operational, light cord in reach Assistance with ambulation/Assistance OOB with selected safe patient handling equipment/Communicate Risk of Fall with Harm to all staff/Discuss with provider need for PT consult/Monitor gait and stability/Provide patient with walking aids - walker, cane, crutches/Reinforce activity limits and safety measures with patient and family/Tailored Fall Risk Interventions/Visual Cue: Yellow wristband and red socks/Bed in lowest position, wheels locked, appropriate side rails in place/Call bell, personal items and telephone in reach/Instruct patient to call for assistance before getting out of bed or chair/Non-slip footwear when patient is out of bed/[place] to call system/Physically safe environment - no spills, clutter or unnecessary equipment/Purposeful Proactive Rounding/Room/bathroom lighting operational, light cord in reach

## 2024-05-10 NOTE — CONSULT NOTE ADULT - SUBJECTIVE AND OBJECTIVE BOX
HPI:  89 y.o female with a past medical history of HTN, bifascicular block, paroxysmal Afib not on anticoagulation, endocarditis, and HFpEF. S/p recent hospitalization at [facility] in 2024 with PNA, bilateral pleural effusions and CHF. Patient is a poor historian history obtained from ER provider and chart. As per daughter patient is becoming increasingly fatigued, unresponsive, and short of breath. She was placed on 3L NC few months prior. Went to Cardiologist office today and was referred to ER for suspicion of pleural effusion. Patient is hard of hearing and unable to speak loudly, she denies dyspnea presently, S/p IV lasix, on NC 3LPM sat 97% (09 May 2024 17:51).    HPI:  89 yo female with a history of HTN, bifascicular block, paroxysmal Afib not on anticoagulation, endocarditis, and HFpEF, s/p recent hospitalization at [facility] in 2024 with PNA, bilateral pleural effusions, CHF, guy in from cardiologist's office with suspicion of pleural effusion.  Per daughter, patient has been increasingly fatigued, unresponsive, and short of breath. Had been placed on 3L NC few months prior.   Elevated BNP. Found with a right pleural effusion on CXR, evaluated by thoracic surgery, plan to monitor (no intervention at this time). Admitted for management of HFpEF, pleural effusion.  Palliative care consulted for goals of care.        PERTINENT PMH REVIEWED: Yes No    PAST MEDICAL & SURGICAL HISTORY:  Hypothyroid      Hypercholesterolemia      Hypertension      Near syncope      Endocarditis      Pneumonia      No significant past surgical history          SOCIAL HISTORY:                                     Admitted from:  home  SNF  [facility]     Surrogate/HCP/Guardian: Phone#:    FAMILY HISTORY:  FH: heart disease  father  when she was  9 , does not remember his exact age when     FH: stroke  her mother  at age 70's        Baseline ADLs (prior to admission):  Independent/ Dependent      Allergies    penicillins (Rash)    Intolerances        Present Symptoms:     Dyspnea: 0 1 2 3   Nausea/Vomiting: Yes No  Anxiety:  Yes No  Depression: Yes No  Fatigue: Yes No  Loss of appetite: Yes No    Pain:             Character-            Duration-            Effect-            Factors-            Frequency-            Location-            Severity-    Review of Systems: Reviewed                     Negative:                     Positive:  Unable to obtain due to poor mentation   All others negative    MEDICATIONS  (STANDING):  amLODIPine   Tablet 5 milliGRAM(s) Oral daily  aspirin enteric coated 325 milliGRAM(s) Oral daily  atorvastatin 10 milliGRAM(s) Oral at bedtime  enoxaparin Injectable 40 milliGRAM(s) SubCutaneous every 24 hours  furosemide   Injectable 40 milliGRAM(s) IV Push daily  furosemide   Injectable 40 milliGRAM(s) IV Push Once  levothyroxine 88 MICROGram(s) Oral daily  saccharomyces boulardii 250 milliGRAM(s) Oral two times a day  spironolactone 25 milliGRAM(s) Oral daily    MEDICATIONS  (PRN):  acetaminophen     Tablet .. 650 milliGRAM(s) Oral every 6 hours PRN Mild Pain (1 - 3)  melatonin 3 milliGRAM(s) Oral at bedtime PRN Insomnia      PHYSICAL EXAM:    Vital Signs Last 24 Hrs  T(C): 36.6 (10 May 2024 07:34), Max: 36.7 (10 May 2024 04:26)  T(F): 97.8 (10 May 2024 07:34), Max: 98.1 (10 May 2024 04:26)  HR: 60 (10 May 2024 07:34) (60 - 71)  BP: 136/67 (10 May 2024 07:34) (136/67 - 169/81)  BP(mean): --  RR: 18 (10 May 2024 07:34) (18 - 18)  SpO2: 97% (10 May 2024 07:34) (96% - 100%)    Parameters below as of 10 May 2024 07:34  Patient On (Oxygen Delivery Method): nasal cannula  O2 Flow (L/min): 2      General: alert  oriented x ____ lethargic agitated                  cachexia  nonverbal  coma    Karnofsky:  %    HEENT: normal  dry mouth  ET tube/trach    Lungs: comfortable tachypnea/labored breathing  excessive secretions    CV: normal  tachycardia    GI: normal  distended  tender  no BS               PEG/NG/OG tube  constipation  last BM:     : normal  incontinent  oliguria/anuria  hair    MSK: normal  weakness  edema             ambulatory  bedbound/wheelchair bound    Skin: normal  pressure ulcers- Stage_____  no rash    LABS:                        10.2   6.44  )-----------( 124      ( 10 May 2024 03:00 )             30.7     05-10    140  |  103  |  24.4<H>  ----------------------------<  76  3.8   |  27.0  |  0.91    Ca    10.9<H>      10 May 2024 03:00  Mg     1.8     05-10    TPro  7.1  /  Alb  3.0<L>  /  TBili  0.5  /  DBili  x   /  AST  24  /  ALT  11  /  AlkPhos  104  05-      Urinalysis Basic - ( 10 May 2024 03:00 )    Color: x / Appearance: x / SG: x / pH: x  Gluc: 76 mg/dL / Ketone: x  / Bili: x / Urobili: x   Blood: x / Protein: x / Nitrite: x   Leuk Esterase: x / RBC: x / WBC x   Sq Epi: x / Non Sq Epi: x / Bacteria: x      I&O's Summary      RADIOLOGY & ADDITIONAL STUDIES:    ADVANCE DIRECTIVES:   DNR YES NO  Completed on:                     MOLST  YES NO   Completed on:  Living Will  YES NO   Completed on:    ISTOP Report:    Reference: [id number]  No results for the search terms that you entered HPI:  89 y.o female with a past medical history of HTN, bifascicular block, paroxysmal Afib not on anticoagulation, endocarditis, and HFpEF. S/p recent hospitalization at [facility] in 2024 with PNA, bilateral pleural effusions and CHF. Patient is a poor historian history obtained from ER provider and chart. As per daughter patient is becoming increasingly fatigued, unresponsive, and short of breath. She was placed on 3L NC few months prior. Went to Cardiologist office today and was referred to ER for suspicion of pleural effusion. Patient is hard of hearing and unable to speak loudly, she denies dyspnea presently, S/p IV lasix, on NC 3LPM sat 97% (09 May 2024 17:51).    HPI:  89 yo female with a history of HTN, bifascicular block, paroxysmal Afib not on anticoagulation, endocarditis, and HFpEF, s/p recent hospitalization at [facility] in 2024 with PNA, bilateral pleural effusions, CHF, sent in from cardiologist's office with suspicion of pleural effusion.  According to admission notes, daughter reports that patient has been increasingly fatigued, unresponsive, and short of breath. Had been placed on 3L NC few months prior.   Elevated BNP. Found with a right pleural effusion on CXR, evaluated by thoracic surgery, plan to monitor (no intervention at this time). Admitted for management of HFpEF, pleural effusion.  Cardiology following.  Palliative care consulted for goals of care.    Patient seen and examined at the bedside at 15:15. No family present at bedside at time of interview and exam.  Patient sleeping at time of arrival.  Limited information obtained from patient.  Opens eyes briefly to tactile stim.  Denies pain.  When asked about shortness of breath, points to oxygen in her nose.  Endorses fatigue and returns to resting, closes her eyes.        PERTINENT PMH REVIEWED: Yes     PAST MEDICAL & SURGICAL HISTORY:  Hypothyroid      Hypercholesterolemia      Hypertension      Near syncope      Endocarditis      Pneumonia      No significant past surgical history          SOCIAL HISTORY:  unable to obtain 2/2 mental status      Surrogate/HCP/Guardian: SonCharlie: [phone]; daughter: Keren Martínez; [phone]    FAMILY HISTORY:  FH: heart disease  father  when she was  9 , does not remember his exact age when     FH: stroke  her mother  at age 70's        Baseline ADLs (prior to admission): unable to obtain 2/2 mental status      Allergies    penicillins (Rash)    Intolerances        Present Symptoms: limited to above      Review of Systems: Reviewed, limited: [place]; otherwise as above: brief period of wakefulness.       MEDICATIONS  (STANDING):  amLODIPine   Tablet 5 milliGRAM(s) Oral daily  aspirin enteric coated 325 milliGRAM(s) Oral daily  atorvastatin 10 milliGRAM(s) Oral at bedtime  enoxaparin Injectable 40 milliGRAM(s) SubCutaneous every 24 hours  furosemide   Injectable 40 milliGRAM(s) IV Push daily  furosemide   Injectable 40 milliGRAM(s) IV Push Once  levothyroxine 88 MICROGram(s) Oral daily  saccharomyces boulardii 250 milliGRAM(s) Oral two times a day  spironolactone 25 milliGRAM(s) Oral daily    MEDICATIONS  (PRN):  acetaminophen     Tablet .. 650 milliGRAM(s) Oral every 6 hours PRN Mild Pain (1 - 3)  melatonin 3 milliGRAM(s) Oral at bedtime PRN Insomnia      PHYSICAL EXAM:    Vital Signs Last 24 Hrs  T(C): 36.6 (10 May 2024 07:34), Max: 36.7 (10 May 2024 04:26)  T(F): 97.8 (10 May 2024 07:34), Max: 98.1 (10 May 2024 04:26)  HR: 60 (10 May 2024 07:34) (60 - 71)  BP: 136/67 (10 May 2024 07:34) (136/67 - 169/81)  BP(mean): --  RR: 18 (10 May 2024 07:34) (18 - 18)  SpO2: 97% (10 May 2024 07:34) (96% - 100%)    Parameters below as of 10 May 2024 07:34  Patient On (Oxygen Delivery Method): nasal cannula  O2 Flow (L/min): 2    Karnofsky: 30  %    Gen: Frail in appearance, in NAD.  No pain behaviors or work of breathing noted  Neuro: alert to self, answers few, simple questions.  Head: NC/AT  Eyes: sclerae non-icteric  ENT: hypophonic, no lip ulcerations.  Resp: diminished, unlabored  CV: S1, S2, + murmur  Abd: soft  Peripheral Vasc: warm  Int: warm and dry  Psych: no psychomotor agitation        LABS:                        10.2   6.44  )-----------( 124      ( 10 May 2024 03:00 )             30.7     05-10    140  |  103  |  24.4<H>  ----------------------------<  76  3.8   |  27.0  |  0.91    Ca    10.9<H>      10 May 2024 03:00  Mg     1.8     -10    TPro  7.1  /  Alb  3.0<L>  /  TBili  0.5  /  DBili  x   /  AST  24  /  ALT  11  /  AlkPhos  104  05-09      Urinalysis Basic - ( 10 May 2024 03:00 )    Color: x / Appearance: x / SG: x / pH: x  Gluc: 76 mg/dL / Ketone: x  / Bili: x / Urobili: x   Blood: x / Protein: x / Nitrite: x   Leuk Esterase: x / RBC: x / WBC x   Sq Epi: x / Non Sq Epi: x / Bacteria: x      I&O's Summary      RADIOLOGY & ADDITIONAL STUDIES: reviewed     ADVANCE DIRECTIVES:   Full code orders  No HCP on file: pt : children: Charlie Keren (noted)    ISTOP Report:    Reference: [id number]  No results for the search terms that you entered

## 2024-05-11 LAB
ANION GAP SERPL CALC-SCNC: 7 MMOL/L — SIGNIFICANT CHANGE UP (ref 5–17)
BASOPHILS # BLD AUTO: 0.05 K/UL — SIGNIFICANT CHANGE UP (ref 0–0.2)
BASOPHILS NFR BLD AUTO: 0.8 % — SIGNIFICANT CHANGE UP (ref 0–2)
BUN SERPL-MCNC: 23.4 MG/DL — HIGH (ref 8–20)
CALCIUM SERPL-MCNC: 10.7 MG/DL — HIGH (ref 8.4–10.5)
CHLORIDE SERPL-SCNC: 103 MMOL/L — SIGNIFICANT CHANGE UP (ref 96–108)
CO2 SERPL-SCNC: 28 MMOL/L — SIGNIFICANT CHANGE UP (ref 22–29)
CREAT SERPL-MCNC: 0.87 MG/DL — SIGNIFICANT CHANGE UP (ref 0.5–1.3)
EGFR: 63 ML/MIN/1.73M2 — SIGNIFICANT CHANGE UP
EOSINOPHIL # BLD AUTO: 0.14 K/UL — SIGNIFICANT CHANGE UP (ref 0–0.5)
EOSINOPHIL NFR BLD AUTO: 2.4 % — SIGNIFICANT CHANGE UP (ref 0–6)
GLUCOSE SERPL-MCNC: 81 MG/DL — SIGNIFICANT CHANGE UP (ref 70–99)
HCT VFR BLD CALC: 31 % — LOW (ref 34.5–45)
HGB BLD-MCNC: 10.1 G/DL — LOW (ref 11.5–15.5)
IMM GRANULOCYTES NFR BLD AUTO: 0.3 % — SIGNIFICANT CHANGE UP (ref 0–0.9)
LYMPHOCYTES # BLD AUTO: 1.22 K/UL — SIGNIFICANT CHANGE UP (ref 1–3.3)
LYMPHOCYTES # BLD AUTO: 20.7 % — SIGNIFICANT CHANGE UP (ref 13–44)
MAGNESIUM SERPL-MCNC: 1.8 MG/DL — SIGNIFICANT CHANGE UP (ref 1.6–2.6)
MCHC RBC-ENTMCNC: 31.3 PG — SIGNIFICANT CHANGE UP (ref 27–34)
MCHC RBC-ENTMCNC: 32.6 GM/DL — SIGNIFICANT CHANGE UP (ref 32–36)
MCV RBC AUTO: 96 FL — SIGNIFICANT CHANGE UP (ref 80–100)
MONOCYTES # BLD AUTO: 0.46 K/UL — SIGNIFICANT CHANGE UP (ref 0–0.9)
MONOCYTES NFR BLD AUTO: 7.8 % — SIGNIFICANT CHANGE UP (ref 2–14)
NEUTROPHILS # BLD AUTO: 4.01 K/UL — SIGNIFICANT CHANGE UP (ref 1.8–7.4)
NEUTROPHILS NFR BLD AUTO: 68 % — SIGNIFICANT CHANGE UP (ref 43–77)
PLATELET # BLD AUTO: 141 K/UL — LOW (ref 150–400)
POTASSIUM SERPL-MCNC: 3.8 MMOL/L — SIGNIFICANT CHANGE UP (ref 3.5–5.3)
POTASSIUM SERPL-SCNC: 3.8 MMOL/L — SIGNIFICANT CHANGE UP (ref 3.5–5.3)
RBC # BLD: 3.23 M/UL — LOW (ref 3.8–5.2)
RBC # FLD: 16.8 % — HIGH (ref 10.3–14.5)
SODIUM SERPL-SCNC: 138 MMOL/L — SIGNIFICANT CHANGE UP (ref 135–145)
T4 AB SER-ACNC: 6.6 UG/DL — SIGNIFICANT CHANGE UP (ref 4.5–12)
WBC # BLD: 5.9 K/UL — SIGNIFICANT CHANGE UP (ref 3.8–10.5)
WBC # FLD AUTO: 5.9 K/UL — SIGNIFICANT CHANGE UP (ref 3.8–10.5)

## 2024-05-11 PROCEDURE — 71045 X-RAY EXAM CHEST 1 VIEW: CPT | Mod: 26

## 2024-05-11 PROCEDURE — 99233 SBSQ HOSP IP/OBS HIGH 50: CPT

## 2024-05-11 RX ADMIN — Medication 40 MILLIGRAM(S): at 05:43

## 2024-05-11 RX ADMIN — Medication 250 MILLIGRAM(S): at 17:23

## 2024-05-11 RX ADMIN — Medication 81 MILLIGRAM(S): at 08:16

## 2024-05-11 RX ADMIN — ENOXAPARIN SODIUM 40 MILLIGRAM(S): 100 INJECTION SUBCUTANEOUS at 22:15

## 2024-05-11 RX ADMIN — AMLODIPINE BESYLATE 5 MILLIGRAM(S): 2.5 TABLET ORAL at 05:42

## 2024-05-11 RX ADMIN — SPIRONOLACTONE 25 MILLIGRAM(S): 25 TABLET, FILM COATED ORAL at 05:41

## 2024-05-11 RX ADMIN — ATORVASTATIN CALCIUM 10 MILLIGRAM(S): 80 TABLET, FILM COATED ORAL at 22:15

## 2024-05-11 RX ADMIN — Medication 5 MILLIGRAM(S): at 22:14

## 2024-05-11 RX ADMIN — Medication 250 MILLIGRAM(S): at 05:41

## 2024-05-11 RX ADMIN — Medication 137 MICROGRAM(S): at 05:42

## 2024-05-11 NOTE — DIETITIAN INITIAL EVALUATION ADULT - OTHER INFO
Pt is a 89 year old female with a past medical history of HTN, bifascicular block, paroxysmal Afib not on anticoagulation, endocarditis, and HFpEF. S/p recent hospitalization at [facility] in March 2024 with PNA, bilateral pleural effusions and CHF. Patient is a poor historian history obtained from ER provider and chart. As per daughter patient is becoming increasingly fatigued, unresponsive, and short of breath. She was placed on 3L NC few months prior. Went to Cardiologist office today and was referred to ER for suspicion of pleural effusion.   Pt admitted with pleural effusion, hfpef exacerbation

## 2024-05-11 NOTE — DIETITIAN INITIAL EVALUATION ADULT - PERTINENT MEDS FT
MEDICATIONS  (STANDING):  amLODIPine   Tablet 5 milliGRAM(s) Oral daily  aspirin  chewable 81 milliGRAM(s) Oral daily  atorvastatin 10 milliGRAM(s) Oral at bedtime  bisacodyl 5 milliGRAM(s) Oral at bedtime  enoxaparin Injectable 40 milliGRAM(s) SubCutaneous every 24 hours  furosemide   Injectable 40 milliGRAM(s) IV Push daily  levothyroxine 137 MICROGram(s) Oral daily  saccharomyces boulardii 250 milliGRAM(s) Oral two times a day  spironolactone 25 milliGRAM(s) Oral daily    MEDICATIONS  (PRN):  acetaminophen     Tablet .. 650 milliGRAM(s) Oral every 6 hours PRN Mild Pain (1 - 3)  melatonin 3 milliGRAM(s) Oral at bedtime PRN Insomnia

## 2024-05-11 NOTE — DIETITIAN INITIAL EVALUATION ADULT - ETIOLOGY
Related to inability to meet sufficient protein energy needs in setting of advanced age, pleural effusion, HFpEF exacerbation, skin breakdown

## 2024-05-11 NOTE — DIETITIAN INITIAL EVALUATION ADULT - PHYSICAL ASSESSMENT TRICEPS
After Visit Summary   8/24/2018    Ann Lopez    MRN: [id number]           Patient Information     Date Of Birth          1980        Visit Information        Provider Department      8/24/2018 9:00 AM Sona Newman MD [facility]        Today's Diagnoses     Supervision of high-risk pregnancy of elderly primigravida    -  1       Follow-ups after your visit        Your next 10 appointments already scheduled     Aug 31, 2018  9:15 AM CDT   ESTABLISHED PRENATAL with Sona Newman MD   [facility] ([facility]    [address]  [place] [id number] [phone]              Who to contact     If you have questions or need follow up information about today's clinic visit or your schedule please contact [facility] directly at [phone].  Normal or non-critical lab and imaging results will be communicated to you by [employer], letter or phone within 4 business days after the clinic has received the results. If you do not hear from us within 7 days, please contact the clinic through [employer] or phone. If you have a critical or abnormal lab result, we will notify you by phone as soon as possible.  Submit refill requests through [employer] or call your pharmacy and they will forward the refill request to us. Please allow 3 business days for your refill to be completed.          Additional Information About Your Visit        MyChart Information     [employer] gives you secure access to your electronic health record. If you see a primary care provider, you can also send messages to your care team and make appointments. If you have questions, please call your primary care clinic.  If you do not have a primary care provider, please call [phone] and they will assist you.        Care EveryWhere ID     This is your Care EveryWhere ID. This could be used by other organizations to access your [place]  medical records  [id number]        Your Vitals Were     Pulse BMI (Body Mass Index)                80 26.87 kg/m2           Blood Pressure from Last 3 Encounters:   08/24/18 114/80   08/17/18 112/68   08/10/18 112/88    Weight from Last 3 Encounters:   08/24/18 89.9 kg (198 lb 2 oz)   08/17/18 89.5 kg (197 lb 6 oz)   08/10/18 88.5 kg (195 lb 2 oz)              Today, you had the following     No orders found for display       Primary Care Provider Office Phone # Fax #    Tanesha PARKER Del Garza [phone] [address]  [facility] [address]        Equal Access to Services     ALICE ETIENNE : Hadii tyrone alejoo Remy, waaxda luqadaha, qaybta kaalmada adeegyada, shey mendoza . So [facility] [phone].    ATENCIÓN: Si habla español, tiene a berman disposición servicios gratuitos de asistencia lingüística. Llame al [phone].    We comply with applicable federal civil rights laws and [place] laws. We do not discriminate on the basis of race, color, national origin, age, disability, sex, sexual orientation, or gender identity.            Thank you!     Thank you for choosing [facility] AND [facility]  for your care. Our goal is always to provide you with excellent care. Hearing back from our patients is one way we can continue to improve our services. Please take a few minutes to complete the written survey that you may receive in the mail after your visit with us. Thank you!             Your Updated Medication List - Protect others around you: Learn how to safely use, store and throw away your medicines at www.disposemymeds.org.          This list is accurate as of 8/24/18  9:55 AM.  Always use your most recent med list.                   Brand Name Dispense Instructions for use Diagnosis    acyclovir 400 MG tablet    ZOVIRAX    80 tablet    Take 1 tablet (400 mg) by mouth 2 times daily    History of herpes genitalis       breast pump Misc     1  each    Reason for need: breastfeeding. Length of need: 1 year    Supervision of high risk pregnancy in third trimester       [employer] PRENATAL 28-0.8 MG Tabs      Take 1 tablet by mouth daily        FISH OIL PO      Take 1 capsule by mouth daily           moderate

## 2024-05-11 NOTE — DIETITIAN INITIAL EVALUATION ADULT - ORAL INTAKE PTA/DIET HISTORY
Pt sleeping soundly during visit; unable to interview at this time. Spoke to nursing staff; pt with fair to poor PO intake; requiring assistance with meals.

## 2024-05-11 NOTE — DIETITIAN INITIAL EVALUATION ADULT - FEEDING SKILL
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 SAFETY    PATIENT'S NAME: Sweetie Alcantara  MRN:   [id number]    SAFETY PLAN:    Step 1: Warning signs / cues (Thoughts, images, mood, situation, behavior) that a crisis may be developing:    Feel like there's a cloud over me, very exhausted, dark and depressing  Step 2: Coping strategies - Things I can do to take my mind off of my problems without contacting another person (relaxation technique, physical activity):     Go outside, get in the hammock, meditate, mindfulness   Spend time with friends, go for a daily walk with my friend   Mindfulness, meditate, go outside     Step 3: People and social settings that provide distraction:     Name: friends Phone: in phone   Name: sister Phone: in phone       Step 4: Other things that are important when I m in crisis: My friend told me, \"The world wouldn't be as bright without me.\" \"I am not alone\"  Patient reports that thoughts come and go and its demand does not mean anything.      Step 5: When I am in crisis, I can ask these people to help me use my safety plan:     Name: friends Phone: in phone   Name: sister Phone: in phone    Step 7: Professionals or agencies I can contact during a " crisis:      Suicide Prevention Lifeline: 4-097-550-TALK (5779)    Crisis Text Line Service (available 24 hours a day, 7 days a week): Text [place] to 353823    Call  **CRISIS (789482) from a cell phone to talk to a team of professionals who can help you.    Crisis Services By [facility]: Phone Number:   [place]     [phone]   [place]    [phone]   [place]    [phone]   [place]    [phone]   [place]    [phone]   [place] [phone]   [place]     [phone]       Call 911 or go to my nearest emergency department.     I helped develop this safety plan and agree to use it when needed.  I have been given a copy of this plan.        Today s date:  6/30/2021 - Reviewed safety plan previously developed through the [facility].  Adapted from Safety Plan Template 2008 Herlinda Heller and Frank Palma is reprinted with the express permission of the authors.  No portion of the Safety Plan Template may be reproduced without the express, written permission.  You can contact the authors at [email or web address] or [email or web address]                         age appropriate assistance

## 2024-05-11 NOTE — DIETITIAN INITIAL EVALUATION ADULT - PERTINENT LABORATORY DATA
05-11    138  |  103  |  23.4<H>  ----------------------------<  81  3.8   |  28.0  |  0.87    Ca    10.7<H>      11 May 2024 05:00  Mg     1.8     05-11    TPro  7.1  /  Alb  3.0<L>  /  TBili  0.5  /  DBili  x   /  AST  24  /  ALT  11  /  AlkPhos  104  05-09

## 2024-05-12 LAB
ANION GAP SERPL CALC-SCNC: 8 MMOL/L — SIGNIFICANT CHANGE UP (ref 5–17)
BUN SERPL-MCNC: 24.4 MG/DL — HIGH (ref 8–20)
CALCIUM SERPL-MCNC: 10.7 MG/DL — HIGH (ref 8.4–10.5)
CHLORIDE SERPL-SCNC: 101 MMOL/L — SIGNIFICANT CHANGE UP (ref 96–108)
CO2 SERPL-SCNC: 28 MMOL/L — SIGNIFICANT CHANGE UP (ref 22–29)
CREAT SERPL-MCNC: 1 MG/DL — SIGNIFICANT CHANGE UP (ref 0.5–1.3)
EGFR: 53 ML/MIN/1.73M2 — LOW
GLUCOSE SERPL-MCNC: 87 MG/DL — SIGNIFICANT CHANGE UP (ref 70–99)
MAGNESIUM SERPL-MCNC: 1.7 MG/DL — SIGNIFICANT CHANGE UP (ref 1.6–2.6)
POTASSIUM SERPL-MCNC: 3.9 MMOL/L — SIGNIFICANT CHANGE UP (ref 3.5–5.3)
POTASSIUM SERPL-SCNC: 3.9 MMOL/L — SIGNIFICANT CHANGE UP (ref 3.5–5.3)
SODIUM SERPL-SCNC: 137 MMOL/L — SIGNIFICANT CHANGE UP (ref 135–145)

## 2024-05-12 PROCEDURE — 99232 SBSQ HOSP IP/OBS MODERATE 35: CPT

## 2024-05-12 RX ORDER — FUROSEMIDE 40 MG
40 TABLET ORAL DAILY
Refills: 0 | Status: DISCONTINUED | OUTPATIENT
Start: 2024-05-12 | End: 2024-05-13

## 2024-05-12 RX ADMIN — ATORVASTATIN CALCIUM 10 MILLIGRAM(S): 80 TABLET, FILM COATED ORAL at 22:14

## 2024-05-12 RX ADMIN — ENOXAPARIN SODIUM 40 MILLIGRAM(S): 100 INJECTION SUBCUTANEOUS at 13:05

## 2024-05-12 RX ADMIN — Medication 137 MICROGRAM(S): at 05:32

## 2024-05-12 RX ADMIN — Medication 40 MILLIGRAM(S): at 05:31

## 2024-05-12 RX ADMIN — AMLODIPINE BESYLATE 5 MILLIGRAM(S): 2.5 TABLET ORAL at 05:31

## 2024-05-12 RX ADMIN — Medication 5 MILLIGRAM(S): at 22:14

## 2024-05-12 RX ADMIN — SPIRONOLACTONE 25 MILLIGRAM(S): 25 TABLET, FILM COATED ORAL at 05:31

## 2024-05-12 RX ADMIN — Medication 81 MILLIGRAM(S): at 13:06

## 2024-05-12 RX ADMIN — Medication 250 MILLIGRAM(S): at 05:31

## 2024-05-12 RX ADMIN — Medication 250 MILLIGRAM(S): at 17:37

## 2024-05-13 PROCEDURE — 99233 SBSQ HOSP IP/OBS HIGH 50: CPT

## 2024-05-13 PROCEDURE — 99232 SBSQ HOSP IP/OBS MODERATE 35: CPT

## 2024-05-13 PROCEDURE — 99497 ADVNCD CARE PLAN 30 MIN: CPT | Mod: 25

## 2024-05-13 RX ORDER — FUROSEMIDE 40 MG
20 TABLET ORAL DAILY
Refills: 0 | Status: DISCONTINUED | OUTPATIENT
Start: 2024-05-14 | End: 2024-05-14

## 2024-05-13 RX ORDER — CHLORHEXIDINE GLUCONATE 213 G/1000ML
1 SOLUTION TOPICAL DAILY
Refills: 0 | Status: DISCONTINUED | OUTPATIENT
Start: 2024-05-13 | End: 2024-05-14

## 2024-05-13 RX ORDER — SENNA PLUS 8.6 MG/1
2 TABLET ORAL AT BEDTIME
Refills: 0 | Status: DISCONTINUED | OUTPATIENT
Start: 2024-05-13 | End: 2024-05-14

## 2024-05-13 RX ORDER — POLYETHYLENE GLYCOL 3350 17 G/17G
17 POWDER, FOR SOLUTION ORAL DAILY
Refills: 0 | Status: DISCONTINUED | OUTPATIENT
Start: 2024-05-13 | End: 2024-05-14

## 2024-05-13 RX ADMIN — Medication 250 MILLIGRAM(S): at 05:48

## 2024-05-13 RX ADMIN — Medication 81 MILLIGRAM(S): at 17:07

## 2024-05-13 RX ADMIN — Medication 5 MILLIGRAM(S): at 21:49

## 2024-05-13 RX ADMIN — Medication 40 MILLIGRAM(S): at 05:48

## 2024-05-13 RX ADMIN — SENNA PLUS 2 TABLET(S): 8.6 TABLET ORAL at 21:48

## 2024-05-13 RX ADMIN — AMLODIPINE BESYLATE 5 MILLIGRAM(S): 2.5 TABLET ORAL at 05:48

## 2024-05-13 RX ADMIN — Medication 137 MICROGRAM(S): at 05:48

## 2024-05-13 RX ADMIN — CHLORHEXIDINE GLUCONATE 1 APPLICATION(S): 213 SOLUTION TOPICAL at 17:07

## 2024-05-13 RX ADMIN — Medication 250 MILLIGRAM(S): at 17:07

## 2024-05-13 RX ADMIN — SPIRONOLACTONE 25 MILLIGRAM(S): 25 TABLET, FILM COATED ORAL at 05:49

## 2024-05-13 RX ADMIN — ENOXAPARIN SODIUM 40 MILLIGRAM(S): 100 INJECTION SUBCUTANEOUS at 12:39

## 2024-05-13 RX ADMIN — POLYETHYLENE GLYCOL 3350 17 GRAM(S): 17 POWDER, FOR SOLUTION ORAL at 17:07

## 2024-05-13 RX ADMIN — ATORVASTATIN CALCIUM 10 MILLIGRAM(S): 80 TABLET, FILM COATED ORAL at 21:48

## 2024-05-13 NOTE — PROGRESS NOTE ADULT - CONVERSATION DETAILS
Met with pt, son Charlie and daughter Keren to introduce role and scope of palliative care team as supportive in the setting of complex comorbidities/serious illnesses, to assist and navigate with complex medical decision making and symptoms during their hospital stay here. Pt oriented x2 but expressed poor insight into her complex medical conditions, deferred to her children for ongoing conversation.  We reviewed baseline function, comorbidities, current hospital course and plan of care. Pt has resided at [facility] since 2019. At baseline she is wheelchair bound and up until 1 year ago, she was able to move around but has since then has become more sedentary and sleeping in WC with increasing assist with ADLs. Family also acknowledge her increasing hospitalizations with most recent in 3/2024 for similar CHF exacerbation and pneumonia. They acknowledged mother's overall functional decline and the increasing difficulty of restoring her back to her baseline each hospitalization. Family states that their primary goal is to preserve her quality of life including preventing the back and forth hospitalization. Writer introduced hospice philosophy of care, was broached lightly by other providers, with focus of treatment shifted towards aggressive symptom directed care, avoiding unnecessary poking and prodding with testings that would not change overall long term prognosis. Family in consensus that comfort measures approach on discharge is in line with their goals of care and requesting hospice evaluation.     Reviewed any prior Health Care Proxy (HCP) / Living Will/ Advance Directives or Medical Order for Life Sustaining Treatments (MOLST) Forms.   - no formal HCP thus both son and daughter are equal surrogate decisions makers. Keren defers to brother as point of contact and that they make decisions together.   - Reviewed in detail advance directives and wishes regarding life sustaining treatments such as CPR and mechanical intubation. Both son/daughter agree that CPR and intubation would NOT likely be medically therapeutic if the heart stops or she is unable to breathe and more likely to impose greater pain/suffering with an even worse outcome for meaningful recovery thereafter. They wish to implement DNR/DNI, no peg, no HD, no rehospitalization and comfort measures on discharge; verbal consent for MOLST completion - copy provided to family for their records.     Significant psychosocial support provided and all questions answered.  Updated med NP Crystal Ware and unit SADIA Beltrán --> to follow up and coordinate hospice consult at SNF

## 2024-05-13 NOTE — PROGRESS NOTE ADULT - TIME BILLING
D/W pt, son keith, daughter larry, np adonay pho[place], [facility] qiana owens, ольга dooley    Total time also includes discussion during interdisciplinary team rounds, chart review including but limited to prior admissions/ GOC discussions, review of established ACP documentations ( ex. living will, HCP, MOLST form),  review of medications/ labs/ imaging, examination, care coordination with other health care professionals, documentation EXCLUDING current goals of care or advance care planning discussions.
Elderly female with recurrent pleural effusions of unknown etiology, partially loculated, no intervention per CTS. Family now opting for comfort care, continues on low dose diuretics. Eventual plan to return to NH with hospice care. Discussed with family at bedside in detail.

## 2024-05-13 NOTE — PROGRESS NOTE ADULT - NUTRITIONAL ASSESSMENT
This patient has been assessed with a concern for Malnutrition and has been determined to have a diagnosis/diagnoses of Moderate protein-calorie malnutrition.    This patient is being managed with:   Diet DASH/TLC-  Sodium & Cholesterol Restricted  Entered: May  9 2024  5:49PM  

## 2024-05-13 NOTE — CHART NOTE - NSCHARTNOTEFT_GEN_A_CORE
Patient resting comfortably in bed on baseline 2 L 02.    Thoracic surgery will sign off at this time.    Please call back if patient status changes or oxygenation requirement increases, or any other concerns.
Palliative care social work note.  SW met with patients daughter Keren earlier this morning to provide support in coping with patients decline. Patient has been resident at [facility] since 2019. Daughter acknowledges decline over last 6 months. SW addressed quality of life and educated regarding palliative care philosophy in care. Daughter reports brother Charlie coming in to have meeting later with palliative care physician to discuss next steps and Hospice transition option in care at SNF. Patients other daughter recently moved ot [facility].

## 2024-05-14 ENCOUNTER — TRANSCRIPTION ENCOUNTER (OUTPATIENT)
Age: 89
End: 2024-05-14

## 2024-05-14 VITALS
RESPIRATION RATE: 19 BRPM | HEART RATE: 68 BPM | OXYGEN SATURATION: 99 % | SYSTOLIC BLOOD PRESSURE: 130 MMHG | DIASTOLIC BLOOD PRESSURE: 82 MMHG | TEMPERATURE: 98 F

## 2024-05-14 LAB
ANION GAP SERPL CALC-SCNC: 9 MMOL/L — SIGNIFICANT CHANGE UP (ref 5–17)
BUN SERPL-MCNC: 27.3 MG/DL — HIGH (ref 8–20)
CALCIUM SERPL-MCNC: 10.8 MG/DL — HIGH (ref 8.4–10.5)
CHLORIDE SERPL-SCNC: 100 MMOL/L — SIGNIFICANT CHANGE UP (ref 96–108)
CO2 SERPL-SCNC: 28 MMOL/L — SIGNIFICANT CHANGE UP (ref 22–29)
CREAT SERPL-MCNC: 0.87 MG/DL — SIGNIFICANT CHANGE UP (ref 0.5–1.3)
EGFR: 63 ML/MIN/1.73M2 — SIGNIFICANT CHANGE UP
GLUCOSE SERPL-MCNC: 91 MG/DL — SIGNIFICANT CHANGE UP (ref 70–99)
MRSA PCR RESULT.: SIGNIFICANT CHANGE UP
POTASSIUM SERPL-MCNC: 3.7 MMOL/L — SIGNIFICANT CHANGE UP (ref 3.5–5.3)
POTASSIUM SERPL-SCNC: 3.7 MMOL/L — SIGNIFICANT CHANGE UP (ref 3.5–5.3)
S AUREUS DNA NOSE QL NAA+PROBE: SIGNIFICANT CHANGE UP
SODIUM SERPL-SCNC: 137 MMOL/L — SIGNIFICANT CHANGE UP (ref 135–145)

## 2024-05-14 PROCEDURE — 93005 ELECTROCARDIOGRAM TRACING: CPT

## 2024-05-14 PROCEDURE — 84436 ASSAY OF TOTAL THYROXINE: CPT

## 2024-05-14 PROCEDURE — 71045 X-RAY EXAM CHEST 1 VIEW: CPT

## 2024-05-14 PROCEDURE — 99239 HOSP IP/OBS DSCHRG MGMT >30: CPT

## 2024-05-14 PROCEDURE — 85025 COMPLETE CBC W/AUTO DIFF WBC: CPT

## 2024-05-14 PROCEDURE — 80053 COMPREHEN METABOLIC PANEL: CPT

## 2024-05-14 PROCEDURE — 36415 COLL VENOUS BLD VENIPUNCTURE: CPT

## 2024-05-14 PROCEDURE — 99285 EMERGENCY DEPT VISIT HI MDM: CPT | Mod: 25

## 2024-05-14 PROCEDURE — 87640 STAPH A DNA AMP PROBE: CPT

## 2024-05-14 PROCEDURE — 87641 MR-STAPH DNA AMP PROBE: CPT

## 2024-05-14 PROCEDURE — 80048 BASIC METABOLIC PNL TOTAL CA: CPT

## 2024-05-14 PROCEDURE — 83880 ASSAY OF NATRIURETIC PEPTIDE: CPT

## 2024-05-14 PROCEDURE — 83735 ASSAY OF MAGNESIUM: CPT

## 2024-05-14 PROCEDURE — 84443 ASSAY THYROID STIM HORMONE: CPT

## 2024-05-14 RX ORDER — VANCOMYCIN HCL 1 G
1 VIAL (EA) INTRAVENOUS
Qty: 0 | Refills: 0 | DISCHARGE

## 2024-05-14 RX ORDER — SPIRONOLACTONE 25 MG/1
1 TABLET, FILM COATED ORAL
Qty: 0 | Refills: 0 | DISCHARGE
Start: 2024-05-14

## 2024-05-14 RX ORDER — LEVOTHYROXINE SODIUM 125 MCG
1 TABLET ORAL
Qty: 0 | Refills: 0 | DISCHARGE

## 2024-05-14 RX ORDER — FUROSEMIDE 40 MG
1 TABLET ORAL
Qty: 0 | Refills: 0 | DISCHARGE
Start: 2024-05-14

## 2024-05-14 RX ADMIN — Medication 137 MICROGRAM(S): at 05:51

## 2024-05-14 RX ADMIN — CHLORHEXIDINE GLUCONATE 1 APPLICATION(S): 213 SOLUTION TOPICAL at 10:01

## 2024-05-14 RX ADMIN — Medication 250 MILLIGRAM(S): at 05:51

## 2024-05-14 RX ADMIN — AMLODIPINE BESYLATE 5 MILLIGRAM(S): 2.5 TABLET ORAL at 05:51

## 2024-05-14 RX ADMIN — Medication 81 MILLIGRAM(S): at 09:59

## 2024-05-14 RX ADMIN — POLYETHYLENE GLYCOL 3350 17 GRAM(S): 17 POWDER, FOR SOLUTION ORAL at 09:59

## 2024-05-14 RX ADMIN — SPIRONOLACTONE 25 MILLIGRAM(S): 25 TABLET, FILM COATED ORAL at 05:51

## 2024-05-14 RX ADMIN — Medication 20 MILLIGRAM(S): at 05:51

## 2024-05-14 NOTE — DISCHARGE NOTE NURSING/CASE MANAGEMENT/SOCIAL WORK - NSDCPEFALRISK_GEN_ALL_CORE
For information on Fall & Injury Prevention, visit: [email or web address] OR  [email or web address] OR  https://www.cdc.gov/steadi/patient.html

## 2024-05-14 NOTE — DISCHARGE NOTE PROVIDER - DETAILS OF MALNUTRITION DIAGNOSIS/DIAGNOSES
This patient has been assessed with a concern for Malnutrition and was treated during this hospitalization for the following Nutrition diagnosis/diagnoses:     -  05/11/2024: Moderate protein-calorie malnutrition

## 2024-05-14 NOTE — DISCHARGE NOTE NURSING/CASE MANAGEMENT/SOCIAL WORK - PATIENT PORTAL LINK FT
You can access the FollowMyHealth Patient Portal offered by [facility] by registering at the following website: [email or web address]. By joining [facility]’s FollowMyHealth portal, you will also be able to view your health information using other applications (apps) compatible with our system.

## 2024-05-14 NOTE — PROGRESS NOTE ADULT - ASSESSMENT
89 y.o female with a past medical history of Hypothyroid,  HTN, bifascicular block, paroxysmal Afib not on anticoagulation, endocarditis, and HFpEF admitted with pleural effusion, hfpef exacerbation    #Pleural effusion  #Acute on chronic HFpEF exacerbation  CXR sm to mod partially R loculated pl effusion  Probnp 1627  c/w lasix 40mg IV daily, +Aldactone  Cardiology following  CT surg for consideration of thoracentesis  Serial XR as per cardio/CTs  [facility]    #HTN  Amlodipine    #HLD  c/w statin    #pAfib  EKG nsr, RBB  pta  incorrect discussed with son changed to 81  Not on AC presumably fall risk    #Hypothyroid  c/w pta synthroid- 112 mcg  WIll increase dose to 137 mcg  TSH 12.87, follow up T4    #DVTppx: Lovenox  GI ppx: Diet  Fall precautions  PT and pall eval  
89 y.o female with a past medical history of Hypothyroid,  HTN, bifascicular block, paroxysmal Afib not on anticoagulation, endocarditis, and HFpEF admitted with pleural effusion, hfpef exacerbation.  conservative management per CT surgery, cardiology. planned for hospice at nursing facility.     #Pleural effusion: recurrent, hx pleurex (subsequently removed)  #Acute on chronic HFpEF exacerbation  CXR sm to mod partially R loculated pl effusion  Probnp 1627  c/w lasix 40mg IV daily--> PO today   +Aldactone  Cardiology following  CT surg : conservative mgmt   [facility]    #HTN  Amlodipine    #HLD  c/w statin    #pAfib  EKG nsr, RBB  pta  incorrect discussed with son changed to 81  Not on AC presumably fall risk    #Hypothyroid  c/w pta synthroid- 112 mcg  WIll increase dose to 137 mcg  TSH 12.87, follow up T4    #DVTppx: Lovenox  GI ppx: Diet  Fall precautions  PT and pall eval    palliative eval given age, comorbidities and functional decline as likely is hospice candidate   d/w son at bedside --opting for hospice care, consult placed   
Assessment  HfpEF with chronic R pleural effusion  Mod MR normal EF  Remote MV SBE 2019  PAF not AC candidate  Bifascicular block      Rec  Cont lasix/norvasc/aldactone/asa/statin  Agree with palliative care in light of advanced age, functional status and comorbidities           HTN, bifascicular block, paroxysmal Afib not on anticoagulation, endocarditis, and previous diastolic heart failure, recent admission for HFpEF who was sent in for weakness, lethargy and is now improved on iv lasix.
89 y.o female with a past medical history of Hypothyroid,  HTN, bifascicular block, paroxysmal Afib not on anticoagulation, endocarditis, and HFpEF admitted with pleural effusion, hfpef exacerbation    #Pleural effusion: recurrent, hx pleurex (subsequently removed)  #Acute on chronic HFpEF exacerbation  CXR sm to mod partially R loculated pl effusion  Probnp 1627  c/w lasix 40mg IV daily, +Aldactone  Cardiology following  CT surg : conservative mgmt   [facility]    #HTN  Amlodipine    #HLD  c/w statin    #pAfib  EKG nsr, RBB  pta  incorrect discussed with son changed to 81  Not on AC presumably fall risk    #Hypothyroid  c/w pta synthroid- 112 mcg  WIll increase dose to 137 mcg  TSH 12.87, follow up T4    #DVTppx: Lovenox  GI ppx: Diet  Fall precautions  PT and pall eval    palliative eval given age, comorbidities and functional decline as likely is hospice candidate   d/w son at bedside   
89 y.o female with a past medical history of Hypothyroid,  HTN, bifascicular block, paroxysmal Afib not on anticoagulation, endocarditis, and HFpEF admitted with pleural effusion, hfpef exacerbation.  conservative management per CT surgery, cardiology. planned for hospice at nursing facility.     #Pleural effusion: recurrent, hx pleurex (subsequently removed)  #Acute on chronic HFpEF exacerbation  CXR sm to mod partially R loculated pl effusion  Probnp 1627  c/w lasix adjusted from IV to PO 5/12 now decreasing to 20 mg daily    +Aldactone  Cardiology following  CT surg : conservative mgmt   [facility]    #HTN  Amlodipine    #HLD  c/w statin    #pAfib  EKG nsr, RBB  pta  incorrect discussed with son changed to 81  Not on AC presumably fall risk    #Hypothyroid  c/w pta synthroid- 112 mcg  WIll increase dose to 137 mcg  TSH 12.87, follow up T4    #DVTppx: Lovenox  Diet: DASH   Fall precautions  PT and pall eval    palliative eval given age, comorbidities and functional decline, family agreed to hospice care at facility when able, diuresis lowered     
89F with PMHx of HTN, bifascicular block, paroxysmal Afib not on anticoagulation, endocarditis, and previous diastolic heart failure (2L NC at home), previous pleural effusions, hypothyroidism, HLD, and C. diff. BIBA from [facility] for worsening pleural effusions and was given IV Lasix 40 mg at around 11:09 AM today. She was recently hospitalized at [facility] in March 2024 with PNA, bilateral pleural effusions and CHF. Daughter and son at bedside and stated that earlier today while at the nursing home they were concerned she had labored breathing and suggested she go to her cardiologist. States that over the last couple days she has been more tired and less interactive than her baseline. She is very [place] but is able to read lips. CT surgery consulted for right pleural effusion seen on admission CXR.
89 yo female with a history of HTN, bifascicular block, paroxysmal Afib not on anticoagulation, endocarditis, and HFpEF, s/p recent hospitalization at [facility] in March 2024 with PNA, bilateral pleural effusions, sent in from cardiologist's office with suspicion of pleural effusion. Admitted for acute decompensated HFpEF with pleural effusion.  Palliative care following for goals of care.    Acute on chronic HFpEF with pleural effusion  - imaging reviewed  - per family, recent admission at [facility] for acute CHF exacerbation   - evaluated by thoracic surgery this admission and per discussion held with family, plan to monitor pleural effusion with diuretics and hold of any further interventions  - cardiology following  - on PO furosemide    Acute Dyspnea  - no overt sign of distress  - continue with supplemental oxygen PRN, currently on 3L     Frailty, Debility  - per family, pt is wheelchair bound but previously was able to get around with it but for the past 1 year mostly sedentary and sleeping with increasing assist with ADLs    Cachexia, Protein Calorie Malnutrition  - low BMI and albumin  - per family, her intake has decreased over the year  - encourage PO intake, nutrition eval, may benefit from ensure supplementation    Palliative Care Encounter  - no formal HCP, both son Charlie and daughter Keren are equal surrogate decision makers per [facility]CDA  - extensive GOC today, completed MOLST for DNR/DNI, no peg, no HD, no rehospitalization, plan for comfort measure on discharge and family requested hospice eval for services at SNF; see above for detail conversation  - case discussed with primary team. palliative team to follow along

## 2024-05-14 NOTE — DISCHARGE NOTE PROVIDER - INSTRUCTIONS
Diet, DASH/TLC:   Sodium & Cholesterol Restricted  Supplement Feeding Modality:  Oral  Ensure Enlive Cans or Servings Per Day:  2       Frequency:  Three Times a day (05-14-24 @ 09:17) [Active]

## 2024-05-14 NOTE — PROGRESS NOTE ADULT - PROVIDER SPECIALTY LIST ADULT
Palliative Care
Cardiology
Cardiology
Internal Medicine
Thoracic Surgery
Cardiology
Cardiology
Hospitalist
Hospitalist
Internal Medicine

## 2024-05-14 NOTE — PROGRESS NOTE ADULT - SUBJECTIVE AND OBJECTIVE BOX
[place] [facility] - [facility], THE HEART CENTER                                   [address] [place] [address]                                                      PHONE: [phone]                                                         FAX: [phone]  [email or web address]  ---------------------------------------------------------------------------------------------------------------------------------    Overnight events/patient complaints:      NAD feeling well today     SON at bedside     penicillins (Rash)    MEDICATIONS  (STANDING):  amLODIPine   Tablet 5 milliGRAM(s) Oral daily  aspirin  chewable 81 milliGRAM(s) Oral daily  atorvastatin 10 milliGRAM(s) Oral at bedtime  bisacodyl 5 milliGRAM(s) Oral at bedtime  enoxaparin Injectable 40 milliGRAM(s) SubCutaneous every 24 hours  furosemide   Injectable 40 milliGRAM(s) IV Push daily  furosemide   Injectable 40 milliGRAM(s) IV Push Once  levothyroxine 137 MICROGram(s) Oral daily  saccharomyces boulardii 250 milliGRAM(s) Oral two times a day  spironolactone 25 milliGRAM(s) Oral daily    MEDICATIONS  (PRN):  acetaminophen     Tablet .. 650 milliGRAM(s) Oral every 6 hours PRN Mild Pain (1 - 3)  melatonin 3 milliGRAM(s) Oral at bedtime PRN Insomnia      Vital Signs Last 24 Hrs  T(C): 36.4 (11 May 2024 08:42), Max: 36.9 (10 May 2024 23:35)  T(F): 97.5 (11 May 2024 08:42), Max: 98.5 (10 May 2024 23:35)  HR: 60 (11 May 2024 08:42) (58 - 70)  BP: 119/64 (11 May 2024 08:42) (107/62 - 149/66)  BP(mean): --  RR: 18 (11 May 2024 08:42) (17 - 18)  SpO2: 99% (11 May 2024 08:42) (96% - 100%)    Parameters below as of 11 May 2024 08:42  Patient On (Oxygen Delivery Method): nasal cannula      ICU Vital Signs Last 24 Hrs  YADIRA AMITA  I&O's Detail    I&O's Summary    Drug Dosing Weight  YADIRA LEVI      PHYSICAL EXAM:  General: Appears well developed, alert and cooperative.  HEENT: Head; normocephalic, atraumatic.  Eyes: Pupils reactive, cornea wnl.  Neck: Supple, no nodes adenopathy, no NVD or carotid bruit or thyromegaly.  CARDIOVASCULAR: Normal S1 and S2, 1/6 murmur, rub, gallop or lift.   LUNGS: Decrease BS at the lower bases   ABDOMEN: Soft, nontender without mass or organomegaly. bowel sounds normoactive.  EXTREMITIES: No clubbing, cyanosis or edema. Distal pulses wnl.   SKIN: warm and dry with normal turgor.  NEURO: Alert/oriented x 3/normal motor exam. No pathologic reflexes.    PSYCH: normal affect.        LABS:                        10.1   5.90  )-----------( 141      ( 11 May 2024 05:00 )             31.0     05-11    138  |  103  |  23.4<H>  ----------------------------<  81  3.8   |  28.0  |  0.87    Ca    10.7<H>      11 May 2024 05:00  Mg     1.8     05-11    TPro  7.1  /  Alb  3.0<L>  /  TBili  0.5  /  DBili  x   /  AST  24  /  ALT  11  /  AlkPhos  104  05-09    YADIRA AMITA        Urinalysis Basic - ( 11 May 2024 05:00 )    Color: x / Appearance: x / SG: x / pH: x  Gluc: 81 mg/dL / Ketone: x  / Bili: x / Urobili: x   Blood: x / Protein: x / Nitrite: x   Leuk Esterase: x / RBC: x / WBC x   Sq Epi: x / Non Sq Epi: x / Bacteria: x        RADIOLOGY & ADDITIONAL STUDIES:    INTERPRETATION OF TELEMETRY (personally reviewed):      ECHOCARDIOGRAM : 9/2023  1. Left ventricle: The cavity size is normal. Mildly increased thickness is present. There are no regional wall motion  abnormalities present. Left ventricular ejection fraction is 65-70%. The left ventricular filling pattern is abnormal from  impaired relaxation. Diastolic dysfunction is present.  2. Right ventricle: The cavity size is normal. Right ventricular systolic function is normal.  3. Mitral valve: The annulus is moderately calcified and posteriorly. The leaflets are mildly calcified. There is moderate  (2+) mitral valve regurgitation. The mean diastolic gradient is 5.0 mm Hg. The peak diastolic gradient is 9.0 mm Hg.  4. Aortic valve: The valve is trileaflet. The leaflets are mildly calcified. There is no aortic stenosis. There is mild (1+)  valvular aortic regurgitation. The peak systolic gradient is 6.0 mm Hg. The mean systolic gradient is 3.0 mm Hg.  5. Tricuspid valve: The tricuspid leaflets are not thickened. The tricuspid valve insufficiency was not well visualized.  6. Pericardium, extracardiac: There is no pericardial effusion.  Findings      < from: Xray Chest 1 View- PORTABLE-Urgent (Xray Chest 1 View- PORTABLE-Urgent .) (05.09.24 @ 14:41) >      IMPRESSION:  Findings suggesting pulmonary edema. Infection is not   excluded in the appropriate clinical context, however.    A small to moderate size partially loculated right pleural effusion   appears larger. There is likely associated passive atelectasis.    < end of copied text >      ASSESSMENT AND PLAN:  89 y.o female with a past medical history of HTN, bifascicular block, paroxysmal Afib not on anticoagulation due to bleeding and fall risk, endocarditis, and diastolic heart failure, recent admission for HFpEF who was sent in for weakness, lethargy and is now improved on IV Lasix Noted to have R sided pleural effusion as well small to moderate see above     1. Clinically improved post diuresis. Would continue IV Lasix   2. Continue aldactone therapy and monitor renal panel   3. Discussed with patient and son that serial CXR will assess for improvement in pleural effusion status. As long as pt's O2 requirements are stable, she can be switched to po diuretics on discharge   4. Plan would be for consideration of thoracentesis if no significant improvement in pleural effusion.   5.  Thoracic surgery following         
  seen for effusion    no events  son at bedside      MEDICATIONS  (STANDING):  amLODIPine   Tablet 5 milliGRAM(s) Oral daily  aspirin  chewable 81 milliGRAM(s) Oral daily  atorvastatin 10 milliGRAM(s) Oral at bedtime  bisacodyl 5 milliGRAM(s) Oral at bedtime  enoxaparin Injectable 40 milliGRAM(s) SubCutaneous every 24 hours  furosemide   Injectable 40 milliGRAM(s) IV Push daily  levothyroxine 137 MICROGram(s) Oral daily  saccharomyces boulardii 250 milliGRAM(s) Oral two times a day  spironolactone 25 milliGRAM(s) Oral daily    MEDICATIONS  (PRN):  acetaminophen     Tablet .. 650 milliGRAM(s) Oral every 6 hours PRN Mild Pain (1 - 3)  melatonin 3 milliGRAM(s) Oral at bedtime PRN Insomnia      Allergies    penicillins (Rash)        Vital Signs Last 24 Hrs  T(C): 36.4 (11 May 2024 08:42), Max: 36.9 (10 May 2024 23:35)  T(F): 97.5 (11 May 2024 08:42), Max: 98.5 (10 May 2024 23:35)  HR: 60 (11 May 2024 08:42) (58 - 66)  BP: 119/64 (11 May 2024 08:42) (119/64 - 149/66)  BP(mean): --  RR: 18 (11 May 2024 08:42) (17 - 18)  SpO2: 99% (11 May 2024 08:42) (99% - 100%)    Parameters below as of 11 May 2024 08:42  Patient On (Oxygen Delivery Method): nasal cannula        PHYSICAL EXAM:    GENERAL: NAD frail   CHEST/LUNG: dec bs right 1/2 chest, no wheezing   HEART: Regular rate and rhythm; S1 S2   ABDOMEN: Soft, Bowel sounds present  EXTREMITIES:  no edema   NERVOUS SYSTEM:  [place], gen weakness    LABS:                        10.1   5.90  )-----------( 141      ( 11 May 2024 05:00 )             31.0     05-11    138  |  103  |  23.4<H>  ----------------------------<  81  3.8   |  28.0  |  0.87    Ca    10.7<H>      11 May 2024 05:00  Mg     1.8     05-11        Urinalysis Basic - ( 11 May 2024 05:00 )    Color: x / Appearance: x / SG: x / pH: x  Gluc: 81 mg/dL / Ketone: x  / Bili: x / Urobili: x   Blood: x / Protein: x / Nitrite: x   Leuk Esterase: x / RBC: x / WBC x   Sq Epi: x / Non Sq Epi: x / Bacteria: x        CAPILLARY BLOOD GLUCOSE            RADIOLOGY & ADDITIONAL TESTS:  
                                           [place] [facility]                                      [facility], THE [facility] CENTER                              [address] [place] [address]                                                 PHONE: [phone]                                                 FAX: [phone]  -----------------------------------------------------------------------------------------------  Pt seen and examined. FU for  shortness of breath     Overnight events/Complaints: Pt awake, alert in no distress. Remains on 2-3 L of O2. Son at bedside.     Vital Signs Last 24 Hrs  T(C): 36.6 (10 May 2024 07:34), Max: 36.7 (10 May 2024 04:26)  T(F): 97.8 (10 May 2024 07:34), Max: 98.1 (10 May 2024 04:26)  HR: 60 (10 May 2024 07:34) (60 - 71)  BP: 136/67 (10 May 2024 07:34) (136/67 - 169/81)  BP(mean): --  RR: 18 (10 May 2024 07:34) (18 - 18)  SpO2: 97% (10 May 2024 07:34) (96% - 100%)    Parameters below as of 10 May 2024 07:34  Patient On (Oxygen Delivery Method): nasal cannula  O2 Flow (L/min): 2    I&O's Summary    RELEVANT PHYSICAL EXAM:  Neck: No obvious JVD  Cardiovascular: regular S1, S2  Respiratory: decreased BS at both bases  Musculoskeletal: Trace edema          LABS:                        10.2   6.44  )-----------( 124      ( 10 May 2024 03:00 )             30.7     05-10    140  |  103  |  24.4<H>  ----------------------------<  76  3.8   |  27.0  |  0.91    Ca    10.9<H>      10 May 2024 03:00  Mg     1.8     05-10    TPro  7.1  /  Alb  3.0<L>  /  TBili  0.5  /  DBili  x   /  AST  24  /  ALT  11  /  AlkPhos  104  05-09      RADIOLOGY & ADDITIONAL STUDIES: (reviewed)  CXR was independently visualized/reviewed and demonstrated: L pleural effusion    CARDIOLOGY TESTING:(reviewed)   ECG (Independent visualization): NSR, RBBB, low voltage, ST-T wave abnormality    ECHOCARDIOGRAM : 9/2023  1. Left ventricle: The cavity size is normal. Mildly increased thickness is present. There are no regional wall motion  abnormalities present. Left ventricular ejection fraction is 65-70%. The left ventricular filling pattern is abnormal from  impaired relaxation. Diastolic dysfunction is present.  2. Right ventricle: The cavity size is normal. Right ventricular systolic function is normal.  3. Mitral valve: The annulus is moderately calcified and posteriorly. The leaflets are mildly calcified. There is moderate  (2+) mitral valve regurgitation. The mean diastolic gradient is 5.0 mm Hg. The peak diastolic gradient is 9.0 mm Hg.  4. Aortic valve: The valve is trileaflet. The leaflets are mildly calcified. There is no aortic stenosis. There is mild (1+)  valvular aortic regurgitation. The peak systolic gradient is 6.0 mm Hg. The mean systolic gradient is 3.0 mm Hg.  5. Tricuspid valve: The tricuspid leaflets are not thickened. The tricuspid valve insufficiency was not well visualized.  6. Pericardium, extracardiac: There is no pericardial effusion.  Findings    TELEMETRY independently visualized/reviewed and demonstrated : NSR      MEDICATIONS:(reviewed)  MEDICATIONS  (STANDING):  amLODIPine   Tablet 5 milliGRAM(s) Oral daily  aspirin enteric coated 325 milliGRAM(s) Oral daily  atorvastatin 10 milliGRAM(s) Oral at bedtime  enoxaparin Injectable 40 milliGRAM(s) SubCutaneous every 24 hours  furosemide   Injectable 40 milliGRAM(s) IV Push daily  furosemide   Injectable 40 milliGRAM(s) IV Push Once  levothyroxine 88 MICROGram(s) Oral daily  saccharomyces boulardii 250 milliGRAM(s) Oral two times a day  spironolactone 25 milliGRAM(s) Oral daily    ASSESSMENT AND PLAN:  89 y.o female with a past medical history of HTN, bifascicular block, paroxysmal Afib not on anticoagulation, endocarditis, and previous diastolic heart failure, recent admission for HFpEF who was sent in for weakness, lethargy and is now improved on iv lasix.  Noted to have R sided pleural effusion as well    1. Clinically improved post diuresis. Would continue iv today   2. Continue aldactone  3. Discussed with pt that serial CXR will assess for improvement in pleural effusion status. As long as pt's O2 requirements are stable, she can be switched to po diuretics on discharge   4. Plan would be for consideration of thoracentesis if no significant improvement in pleural effusion.     Additional history obtained from family [independent historian] and plan of care discussed at bedside        
                                           [facility], THE HEART CENTER                              [address] [place] [address]                                                 PHONE: [phone]                                                 FAX: [phone]  -----------------------------------------------------------------------------------------------  Pt seen and examined. FU for  shortness of breath     Overnight events/Complaints: Pt awake, alert in no distress. Remains on 2-3 L of O2. Daughter at bedside.     Vital Signs Last 24 Hrs  T(C): 37.1 (13 May 2024 05:00), Max: 37.1 (13 May 2024 05:00)  T(F): 98.8 (13 May 2024 05:00), Max: 98.8 (13 May 2024 05:00)  HR: 55 (13 May 2024 05:00) (55 - 58)  BP: 128/61 (13 May 2024 05:00) (107/59 - 136/61)  BP(mean): --  RR: 18 (13 May 2024 05:00) (17 - 18)  SpO2: 97% (13 May 2024 05:00) (95% - 97%)    Parameters below as of 13 May 2024 05:00  Patient On (Oxygen Delivery Method): nasal cannula  O2 Flow (L/min): 3    I&O's Summary    12 May 2024 07:01  -  13 May 2024 07:00  --------------------------------------------------------  IN: 0 mL / OUT: 700 mL / NET: -700 mL    RELEVANT PHYSICAL EXAM:  Neck: No obvious JVD  Cardiovascular: regular S1, S2  Respiratory: decreased BS at both bases  Musculoskeletal: Trace edema          LABS:    05-12    137  |  101  |  24.4<H>  ----------------------------<  87  3.9   |  28.0  |  1.00    Ca    10.7<H>      12 May 2024 04:00  Mg     1.7     05-12      RADIOLOGY & ADDITIONAL STUDIES: (reviewed)  CXR was independently visualized/reviewed and demonstrated: L pleural effusion with layering    CARDIOLOGY TESTING:(reviewed)   ECG (Independent visualization): NSR, RBBB, low voltage, ST-T wave abnormality    ECHOCARDIOGRAM : 9/2023  1. Left ventricle: The cavity size is normal. Mildly increased thickness is present. There are no regional wall motion  abnormalities present. Left ventricular ejection fraction is 65-70%. The left ventricular filling pattern is abnormal from  impaired relaxation. Diastolic dysfunction is present.  2. Right ventricle: The cavity size is normal. Right ventricular systolic function is normal.  3. Mitral valve: The annulus is moderately calcified and posteriorly. The leaflets are mildly calcified. There is moderate  (2+) mitral valve regurgitation. The mean diastolic gradient is 5.0 mm Hg. The peak diastolic gradient is 9.0 mm Hg.  4. Aortic valve: The valve is trileaflet. The leaflets are mildly calcified. There is no aortic stenosis. There is mild (1+)  valvular aortic regurgitation. The peak systolic gradient is 6.0 mm Hg. The mean systolic gradient is 3.0 mm Hg.  5. Tricuspid valve: The tricuspid leaflets are not thickened. The tricuspid valve insufficiency was not well visualized.  6. Pericardium, extracardiac: There is no pericardial effusion.  Findings    TELEMETRY independently visualized/reviewed and demonstrated : NSR      MEDICATIONS:(reviewed)  MEDICATIONS  (STANDING):  amLODIPine   Tablet 5 milliGRAM(s) Oral daily  aspirin  chewable 81 milliGRAM(s) Oral daily  atorvastatin 10 milliGRAM(s) Oral at bedtime  bisacodyl 5 milliGRAM(s) Oral at bedtime  enoxaparin Injectable 40 milliGRAM(s) SubCutaneous every 24 hours  furosemide    Tablet 40 milliGRAM(s) Oral daily  levothyroxine 137 MICROGram(s) Oral daily  saccharomyces boulardii 250 milliGRAM(s) Oral two times a day  spironolactone 25 milliGRAM(s) Oral daily    ASSESSMENT AND PLAN:  89 y.o female with a past medical history of HTN, bifascicular block, paroxysmal Afib not on anticoagulation, endocarditis, and previous diastolic heart failure, recent admission for HFpEF who was sent in for weakness, lethargy and is now improved on iv lasix.  Noted to have R sided pleural effusion as well    1. Clinically improved post diuresis. Decrease lasix to 20 mg daily  2. Continue aldactone  3. Pt and family requesting palliative care    Additional history obtained from family [independent historian] and plan of care discussed at bedside        
[facility] Division of Hospital Medicine    Chief Complaint:      SUBJECTIVE / OVERNIGHT EVENTS:    Patient seen and examined at bedside, no acute events overnight, patient denies any new complaints. Patient seen by Palliative care team in afternoon, DNR/DNI now with MOLST completed, family would like Hospice care on DC back to SNF, otherwise currently continuing treatment. Seen by cardiology, Diuresis adjusted to Lasix 20 mg PO daily.     MEDICATIONS  (STANDING):  amLODIPine   Tablet 5 milliGRAM(s) Oral daily  aspirin  chewable 81 milliGRAM(s) Oral daily  atorvastatin 10 milliGRAM(s) Oral at bedtime  bisacodyl 5 milliGRAM(s) Oral at bedtime  chlorhexidine 2% Cloths 1 Application(s) Topical daily  enoxaparin Injectable 40 milliGRAM(s) SubCutaneous every 24 hours  furosemide    Tablet 40 milliGRAM(s) Oral daily  levothyroxine 137 MICROGram(s) Oral daily  polyethylene glycol 3350 17 Gram(s) Oral daily  saccharomyces boulardii 250 milliGRAM(s) Oral two times a day  senna 2 Tablet(s) Oral at bedtime  spironolactone 25 milliGRAM(s) Oral daily    MEDICATIONS  (PRN):  acetaminophen     Tablet .. 650 milliGRAM(s) Oral every 6 hours PRN Mild Pain (1 - 3)  melatonin 3 milliGRAM(s) Oral at bedtime PRN Insomnia        I&O's Summary    12 May 2024 07:01  -  13 May 2024 07:00  --------------------------------------------------------  IN: 0 mL / OUT: 700 mL / NET: -700 mL        PHYSICAL EXAM:  Vital Signs Last 24 Hrs  T(C): 36.5 (13 May 2024 09:45), Max: 37.1 (13 May 2024 05:00)  T(F): 97.7 (13 May 2024 09:45), Max: 98.8 (13 May 2024 05:00)  HR: 60 (13 May 2024 09:45) (55 - 60)  BP: 129/64 (13 May 2024 09:45) (107/59 - 136/61)  BP(mean): --  RR: 17 (13 May 2024 09:45) (17 - 18)  SpO2: 100% (13 May 2024 09:45) (95% - 100%)    Parameters below as of 13 May 2024 09:45  Patient On (Oxygen Delivery Method): nasal cannula  O2 Flow (L/min): 3      GENERAL: nad, frail   CHEST/LUNG:dec bs R>L  HEART: Regular rate and rhythm; S1 S2  ABDOMEN: Soft,  Bowel sounds present  EXTREMITIES: no edema     LABS:    05-12    137  |  101  |  24.4<H>  ----------------------------<  87  3.9   |  28.0  |  1.00    Ca    10.7<H>      12 May 2024 04:00  Mg     1.7     05-12            Urinalysis Basic - ( 12 May 2024 04:00 )    Color: x / Appearance: x / SG: x / pH: x  Gluc: 87 mg/dL / Ketone: x  / Bili: x / Urobili: x   Blood: x / Protein: x / Nitrite: x   Leuk Esterase: x / RBC: x / WBC x   Sq Epi: x / Non Sq Epi: x / Bacteria: x        CAPILLARY BLOOD GLUCOSE            RADIOLOGY & ADDITIONAL TESTS:  Results Reviewed:   Imaging Personally Reviewed:  Electrocardiogram Personally Reviewed:      
[facility] PALLIATIVE MEDICINE     CC: FOLLOW UP VISIT + GOC    INTERVAL HPI/OVERNIGHT EVENTS:  Source if other than patient:  []Family   [x]Team     No acute events overnight.   Pt seen earlier this morning with daughter and again this afternoon with both daughter and son present.   Pt resting comfortably and offered no acute complaints.     PRESENT SYMPTOMS:     Dyspnea: no  Nausea/Vomiting:  No  Anxiety:   No  Depression: No  Fatigue: Yes    Loss of appetite: Yes    Constipation:  No    Pain: No            Character-            Duration-            Effect-            Factors-            Frequency-            Location-            Severity-    Pain AD Score:  [email or web address] (press ctrl + left click to view)    Review of Systems: Reviewed                     Negative: no chest pain or dyspnea at rest                     Positive: hard of hearing  All others negative     MEDICATIONS  (STANDING):  amLODIPine   Tablet 5 milliGRAM(s) Oral daily  aspirin  chewable 81 milliGRAM(s) Oral daily  atorvastatin 10 milliGRAM(s) Oral at bedtime  bisacodyl 5 milliGRAM(s) Oral at bedtime  chlorhexidine 2% Cloths 1 Application(s) Topical daily  enoxaparin Injectable 40 milliGRAM(s) SubCutaneous every 24 hours  furosemide    Tablet 40 milliGRAM(s) Oral daily  levothyroxine 137 MICROGram(s) Oral daily  saccharomyces boulardii 250 milliGRAM(s) Oral two times a day  spironolactone 25 milliGRAM(s) Oral daily    MEDICATIONS  (PRN):  acetaminophen     Tablet .. 650 milliGRAM(s) Oral every 6 hours PRN Mild Pain (1 - 3)  melatonin 3 milliGRAM(s) Oral at bedtime PRN Insomnia      PHYSICAL EXAM:    Vital Signs Last 24 Hrs  T(C): 36.5 (13 May 2024 09:45), Max: 37.1 (13 May 2024 05:00)  T(F): 97.7 (13 May 2024 09:45), Max: 98.8 (13 May 2024 05:00)  HR: 60 (13 May 2024 09:45) (55 - 60)  BP: 129/64 (13 May 2024 09:45) (107/59 - 136/61)  BP(mean): --  RR: 17 (13 May 2024 09:45) (17 - 18)  SpO2: 100% (13 May 2024 09:45) (95% - 100%)    Parameters below as of 13 May 2024 09:45  Patient On (Oxygen Delivery Method): nasal cannula  O2 Flow (L/min): 3    Karnofsky:  30%    GEN: frail. cachectic. resting comfortably and no acute distress    HEENT: mucous membrane moist      Lungs: comfortable, nonlabored      CV: +s1/s2 regular rate and rhythm       GI: +BS, abdomen soft, nontender, nondistended      MSK: no cyanosis or edema+weakness  +wheelchair bound    NEURO: nonfocal. awake and alert, oriented x self, hospital and names of her two children     Skin: warm and dry.       Psych: normal affect, appropriate behavior    LABS:      05-12    137  |  101  |  24.4<H>  ----------------------------<  87  3.9   |  28.0  |  1.00    Ca    10.7<H>      12 May 2024 04:00  Mg     1.7     05-12        Urinalysis Basic - ( 12 May 2024 04:00 )    Color: x / Appearance: x / SG: x / pH: x  Gluc: 87 mg/dL / Ketone: x  / Bili: x / Urobili: x   Blood: x / Protein: x / Nitrite: x   Leuk Esterase: x / RBC: x / WBC x   Sq Epi: x / Non Sq Epi: x / Bacteria: x      I&O's Summary    12 May 2024 07:01  -  13 May 2024 07:00  --------------------------------------------------------  IN: 0 mL / OUT: 700 mL / NET: -700 mL    RADIOLOGY & ADDITIONAL STUDIES: Reviewed     CXR    ACC: [id number] EXAM:  XR CHEST PORTABLE ROUTINE 1V   ORDERED BY: PHYLLIS JUSTICE     PROCEDURE DATE:  05/11/2024          INTERPRETATION:  HISTORY: Admitting Dxs: J90 PLEURAL EFFUSION, NOT   ELSEWHERE CLASSIFIED; ; evaluate pleural effusion;  TECHNIQUE: Portable frontal view of the chest, 1 view.  COMPARISON: 5/10/2024.  FINDINGS/  IMPRESSION:  There is a loop recorder overlying the left chest.  HEART: normal in size.  LUNGS: There is opacity at the right base compatible with   effusion/infiltrate..  BONES: degenerative changes    --- End of Report ---    BORA COTTRELL MD; Attending Interventional Radiologist  This document has been electronically signed. May 12 2024  9:45AM    ADVANCE DIRECTIVES/TREATMENT PREFERENCES: DNR/DNI MOLST no peg no HD, MOLST completed today    NEUROLOGICAL MEDICATIONS/OPIOIDS/BENZODIAZEPINE IN PAST 24 HOURS    
                [place] [facility] - [facility], THE HEART CENTER                                   [address] [place] [address]                                                      PHONE: [phone]                                                         FAX: [phone]  [email or web address]  ---------------------------------------------------------------------------------------------------------------------------------    Overnight events/patient complaints: no complaints aside from back/leg pain, tele SR with apcs      penicillins (Rash)    MEDICATIONS  (STANDING):  amLODIPine   Tablet 5 milliGRAM(s) Oral daily  aspirin  chewable 81 milliGRAM(s) Oral daily  atorvastatin 10 milliGRAM(s) Oral at bedtime  bisacodyl 5 milliGRAM(s) Oral at bedtime  chlorhexidine 2% Cloths 1 Application(s) Topical daily  enoxaparin Injectable 40 milliGRAM(s) SubCutaneous every 24 hours  furosemide    Tablet 20 milliGRAM(s) Oral daily  levothyroxine 137 MICROGram(s) Oral daily  polyethylene glycol 3350 17 Gram(s) Oral daily  saccharomyces boulardii 250 milliGRAM(s) Oral two times a day  senna 2 Tablet(s) Oral at bedtime  spironolactone 25 milliGRAM(s) Oral daily    MEDICATIONS  (PRN):  acetaminophen     Tablet .. 650 milliGRAM(s) Oral every 6 hours PRN Mild Pain (1 - 3)  melatonin 3 milliGRAM(s) Oral at bedtime PRN Insomnia      Vital Signs Last 24 Hrs  T(C): 36.8 (14 May 2024 07:47), Max: 37.6 (13 May 2024 21:02)  T(F): 98.2 (14 May 2024 07:47), Max: 99.6 (13 May 2024 21:02)  HR: 59 (14 May 2024 07:47) (59 - 69)  BP: 121/61 (14 May 2024 07:47) (121/61 - 126/57)  BP(mean): --  RR: 18 (14 May 2024 07:47) (17 - 18)  SpO2: 97% (14 May 2024 07:47) (96% - 99%)    Parameters below as of 14 May 2024 07:47  Patient On (Oxygen Delivery Method): nasal cannula      Daily     Daily   ICU Vital Signs Last 24 Hrs  YADIRA LEVI  I&O's Detail    I&O's Summary    Drug Dosing Weight  YADIRA BRYANORE      PHYSICAL EXAM:  General: Appears alert and cooperative.  HEENT: Head; normocephalic, atraumatic.  Eyes: Pupils reactive, cornea wnl.  Neck: Supple, no nodes adenopathy, no NVD or carotid bruit or thyromegaly.  CARDIOVASCULAR: Normal S1 and S2, No murmur, rub, gallop or lift.   LUNGS: decreased BS right base  ABDOMEN: Soft, nontender without mass or organomegaly. bowel sounds normoactive.  EXTREMITIES: No clubbing, cyanosis or edema. Distal pulses wnl.   SKIN: warm and dry with normal turgor.  NEURO: Alert/oriented x 3/normal motor exam. No pathologic reflexes.    PSYCH: normal affect.        LABS:    05-14    137  |  100  |  27.3<H>  ----------------------------<  91  3.7   |  28.0  |  0.87    Ca    10.8<H>      14 May 2024 05:32      YADIRA AMITA        Urinalysis Basic - ( 14 May 2024 05:32 )    Color: x / Appearance: x / SG: x / pH: x  Gluc: 91 mg/dL / Ketone: x  / Bili: x / Urobili: x   Blood: x / Protein: x / Nitrite: x   Leuk Esterase: x / RBC: x / WBC x   Sq Epi: x / Non Sq Epi: x / Bacteria: x        RADIOLOGY & ADDITIONAL STUDIES:< from: Xray Chest 1 View- PORTABLE-Routine (Xray Chest 1 View- PORTABLE-Routine .) (05.11.24 @ 10:24) >    ACC: [id number] EXAM:  XR CHEST PORTABLE ROUTINE 1V   ORDERED BY: PHYLLIS JUSTICE     PROCEDURE DATE:  05/11/2024          INTERPRETATION:  HISTORY: Admitting Dxs: J90 PLEURAL EFFUSION, NOT   ELSEWHERE CLASSIFIED; ; evaluate pleural effusion;  TECHNIQUE: Portable frontal view of the chest, 1 view.  COMPARISON: 5/10/2024.  FINDINGS/  IMPRESSION:  There is a loop recorder overlying the left chest.  HEART: normal in size.  LUNGS: There is opacity at the right base compatible with   effusion/infiltrate..  BONES: degenerative changes    --- End of Report ---            BORA COTTRELL MD; Attending Interventional Radiologist  This document has been electronically signed. May 12 2024  9:45AM    < end of copied text >      INTERPRETATION OF TELEMETRY (personally reviewed):    ECG: < from: 12 Lead ECG (05.09.24 @ 12:04) >  Diagnosis Line Sinus rhythm with occasional Prematureventricular complexes  Left axis deviation  Right bundle branch block  Abnormal ECG    Confirmed by Marty Rowan ([id number]) on 5/9/2024 1:36:08 PM    < end of copied text >      ECHO:< from: MARYCHUY Echo Doppler (03.29.19 @ 16:22) >    EXAM:  ECHO TRANSESOPHAGEAL    EXAM:  DOPPLER ECHO COMP W SPECTRAL    EXAM:  DOPPLER ECHOCARD COLOR FLOW      PROCEDURE DATE:  Mar 29 2019   .      INTERPRETATION:  REPORT:    TRANSESOPHAGEAL ECHOCARDIOGRAM REPORT         Patient Name:   YADIRA LEVI Patient Location: [facility]  Medical Rec #:  [id number]      Accession #:      [id number]  Account #:                    Height:           60.0 in 152.4 cm  YOB: 1933     Weight:           141.1 lb 63.99 kg  Patient Age:    85 years  BSA:              1.61 m²  Patient Gender: F             BP:               / mmHg       Date of Exam: 3/29/2019 4:22:42 PM  Sonographer:  Larry Morocho    Procedure:   Transesophageal Echocardiogram.  Indications: Nonrheumatic mitral valve disorder, unspecified - I34.9  Diagnosis:   Nonrheumatic mitral valve disorder, unspecified - I34.9    SPECTRAL DOPPLER ANALYSIS (where applicable):     PHYSICIAN INTERPRETATION:  Left Ventricle:  Left ventricular ejection fraction, by visual estimation, is 55 to 60%.  Right Ventricle: The right ventricular size is normal. RV systolic   function is normal.  Left Atrium: No left atrial appendage thrombus is seen. Intact   intra-atrial septum without shunt.  Right Atrium: Normal right atrial size.  Mitral Valve: There is mild mitral annular calcification. Mild to   moderate mitral valve regurgitation is seen. Mobile mass/vegetation    1.38 x 0.7 cm seen on posterior mitral valve leaflet P2 with mild to   moderate MR likely consistent with endocarditis.  Tricuspid Valve: Mild tricuspid regurgitation is visualized.  Aortic Valve: The aortic valve is trileaflet. Trivial aortic valve   regurgitation is seen.  Pulmonic Valve: No indication of pulmonic valve regurgitation.  Aorta: There is mild aortic root calcification.       Summary:   1. Mobile mass/vegetation    1.38 x 0.7 cm seen on posterior mitral valve leaflet P2 with mild to   moderate MR likely consistent with endocarditis.   2. Left ventricular ejection fraction, by visual estimation, is 55to   60%.   3. Mild mitral annular calcification.   4. Mild to moderate mitral valve regurgitation.   5. Mild tricuspid regurgitation.   6. Intact intra-atrial septum without shunt.   7. There is mild aortic root calcification.    [id number] Mati Wright MD, Electronically signed on 3/29/2019 at 5:02:39 PM    < end of copied text >      STRESS TEST:        
  seen for effusion    no events/complaints  son at baseline     MEDICATIONS  (STANDING):  amLODIPine   Tablet 5 milliGRAM(s) Oral daily  aspirin  chewable 81 milliGRAM(s) Oral daily  atorvastatin 10 milliGRAM(s) Oral at bedtime  bisacodyl 5 milliGRAM(s) Oral at bedtime  enoxaparin Injectable 40 milliGRAM(s) SubCutaneous every 24 hours  furosemide    Tablet 40 milliGRAM(s) Oral daily  levothyroxine 137 MICROGram(s) Oral daily  saccharomyces boulardii 250 milliGRAM(s) Oral two times a day  spironolactone 25 milliGRAM(s) Oral daily    MEDICATIONS  (PRN):  acetaminophen     Tablet .. 650 milliGRAM(s) Oral every 6 hours PRN Mild Pain (1 - 3)  melatonin 3 milliGRAM(s) Oral at bedtime PRN Insomnia      Allergies    penicillins (Rash)      Vital Signs Last 24 Hrs  T(C): 36.7 (12 May 2024 08:19), Max: 37 (11 May 2024 20:00)  T(F): 98.1 (12 May 2024 08:19), Max: 98.6 (11 May 2024 20:00)  HR: 60 (12 May 2024 08:19) (59 - 68)  BP: 112/59 (12 May 2024 08:19) (112/59 - 116/59)  BP(mean): --  RR: 16 (12 May 2024 08:19) (16 - 18)  SpO2: 95% (12 May 2024 08:19) (92% - 97%)    Parameters below as of 12 May 2024 08:19  Patient On (Oxygen Delivery Method): nasal cannula        PHYSICAL EXAM:    GENERAL: nad, frail   CHEST/LUNG:dec bs R>L  HEART: Regular rate and rhythm; S1 S2  ABDOMEN: Soft,  Bowel sounds present  EXTREMITIES: no edema     LABS:                        10.1   5.90  )-----------( 141      ( 11 May 2024 05:00 )             31.0     05-12    137  |  101  |  24.4<H>  ----------------------------<  87  3.9   |  28.0  |  1.00    Ca    10.7<H>      12 May 2024 04:00  Mg     1.7     05-12        Urinalysis Basic - ( 12 May 2024 04:00 )    Color: x / Appearance: x / SG: x / pH: x  Gluc: 87 mg/dL / Ketone: x  / Bili: x / Urobili: x   Blood: x / Protein: x / Nitrite: x   Leuk Esterase: x / RBC: x / WBC x   Sq Epi: x / Non Sq Epi: x / Bacteria: x        CAPILLARY BLOOD GLUCOSE            RADIOLOGY & ADDITIONAL TESTS:  
YADIRA LEVI  89y  Female      Patient is a 89y old  Female who presents with a chief complaint of Pleural effusion  HFpEF exacerbation (10 May 2024 11:38)      INTERVAL HPI/OVERNIGHT EVENTS:  Seen and examined, resting comfortably, son at bedside      REVIEW OF SYSTEMS:  CONSTITUTIONAL: No fever, weight loss, or fatigue  EYES: No eye pain, visual disturbances, or discharge  ENMT:  No difficulty hearing, tinnitus, vertigo; No sinus or throat pain  NECK: No pain or stiffness  BREASTS: No pain, masses, or nipple discharge  RESPIRATORY: No cough, wheezing, chills or hemoptysis; No shortness of breath  CARDIOVASCULAR: No chest pain, palpitations, dizziness, or leg swelling  GASTROINTESTINAL: No abdominal or epigastric pain.   GENITOURINARY: No dysuria, frequency, hematuria, or incontinence  NEUROLOGICAL: No headaches, memory loss, loss of strength, numbness, or tremors  SKIN: No itching, burning, rashes, or lesions   LYMPH NODES: No enlarged glands  ENDOCRINE: No heat or cold intolerance; No hair loss  MUSCULOSKELETAL: No joint pain or swelling; No muscle, back, or extremity pain  PSYCHIATRIC: No depression, anxiety, mood swings, or difficulty sleeping  HEME/LYMPH: No easy bruising, or bleeding gums  ALLERY AND IMMUNOLOGIC: No hives or eczema    T(C): 36.8 (05-10-24 @ 11:15), Max: 36.8 (05-10-24 @ 11:15)  HR: 70 (05-10-24 @ 11:15) (60 - 71)  BP: 107/62 (05-10-24 @ 11:15) (107/62 - 169/81)  RR: 18 (05-10-24 @ 11:15) (18 - 18)  SpO2: 96% (05-10-24 @ 11:15) (96% - 100%)  Wt(kg): --Vital Signs Last 24 Hrs  T(C): 36.8 (10 May 2024 11:15), Max: 36.8 (10 May 2024 11:15)  T(F): 98.2 (10 May 2024 11:15), Max: 98.2 (10 May 2024 11:15)  HR: 70 (10 May 2024 11:15) (60 - 71)  BP: 107/62 (10 May 2024 11:15) (107/62 - 169/81)  BP(mean): --  RR: 18 (10 May 2024 11:15) (18 - 18)  SpO2: 96% (10 May 2024 11:15) (96% - 100%)    Parameters below as of 10 May 2024 11:15  Patient On (Oxygen Delivery Method): nasal cannula  O2 Flow (L/min): 2      PHYSICAL EXAM:  General: Elderly, frail   HEENT: +[place], +NC  Neck: Supple, nontender, no mass  Neurology: A&Ox1-2   Respiratory: Diminished air movement bl,   CV: RRR, +S1/S2, no murmurs, rubs or gallops  Abdominal: Soft, NT, ND +BSx4  Extremities: No C/C/E, + peripheral pulses  MSK: Normal ROM, no joint erythema or warmth, no joint swelling   Skin: warm, dry, normal color, no rash or abnormal lesions  Consultant(s) Notes Reviewed:  [x ] YES  [ ] NO       LABS:                        10.2   6.44  )-----------( 124      ( 10 May 2024 03:00 )             30.7     05-10    140  |  103  |  24.4<H>  ----------------------------<  76  3.8   |  27.0  |  0.91    Ca    10.9<H>      10 May 2024 03:00  Mg     1.8     05-10    TPro  7.1  /  Alb  3.0<L>  /  TBili  0.5  /  DBili  x   /  AST  24  /  ALT  11  /  AlkPhos  104  05-09      Urinalysis Basic - ( 10 May 2024 03:00 )    Color: x / Appearance: x / SG: x / pH: x  Gluc: 76 mg/dL / Ketone: x  / Bili: x / Urobili: x   Blood: x / Protein: x / Nitrite: x   Leuk Esterase: x / RBC: x / WBC x   Sq Epi: x / Non Sq Epi: x / Bacteria: x      CAPILLARY BLOOD GLUCOSE            Urinalysis Basic - ( 10 May 2024 03:00 )    Color: x / Appearance: x / SG: x / pH: x  Gluc: 76 mg/dL / Ketone: x  / Bili: x / Urobili: x   Blood: x / Protein: x / Nitrite: x   Leuk Esterase: x / RBC: x / WBC x   Sq Epi: x / Non Sq Epi: x / Bacteria: x        RADIOLOGY & ADDITIONAL TESTS:    Imaging Personally Reviewed:  [ ] YES  [ ] NO    HEALTH ISSUES - PROBLEM Dx:  Pleural effusion, right        
Subjective: 89F seen at bedside with son. Sleeping comfortably in bed on 2L NC. At this time son has no complaints or concerns. Unable to obtain ROS from patient.     Vital Signs:  Vital Signs Last 24 Hrs  T(C): 36.8 (05-10-24 @ 11:15), Max: 36.8 (05-10-24 @ 11:15)  T(F): 98.2 (05-10-24 @ 11:15), Max: 98.2 (05-10-24 @ 11:15)  HR: 70 (05-10-24 @ 11:15) (60 - 71)  BP: 107/62 (05-10-24 @ 11:15) (107/62 - 169/81)  RR: 18 (05-10-24 @ 11:15) (18 - 18)  SpO2: 96% (05-10-24 @ 11:15) (96% - 100%) on (O2)      Relevant labs, radiology and Medications reviewed    Pertinent Physical Exam        PHYSICAL EXAM:  GENERAL: No acute distress, frail, on NC  CHEST/LUNG: Decreased breath sounds on right base; No wheezes, rales, or rhonchi  HEART: Irregular. (+) murmurs. No rubs, or gallops

## 2024-05-14 NOTE — DISCHARGE NOTE PROVIDER - NSDCMRMEDTOKEN_GEN_ALL_CORE_FT
acetaminophen 325 mg oral tablet: 2 tab(s) orally every 6 hours  amLODIPine 5 mg oral tablet: 1 tab(s) orally once a day  aspirin 81 mg oral delayed release tablet: 1 tab(s) orally once a day  atorvastatin 10 mg oral tablet: 1 tab(s) orally once a day (at bedtime)  Dulcolax Laxative 5 mg oral tablet: 1 tab(s) orally once a day  furosemide 20 mg oral tablet: 1 tab(s) orally once a day  ipratropium-albuterol 0.5 mg-2.5 mg/3 mLinhalation solution: 3 milliliter(s) inhaled every 6 hours, As needed, Shortness of Breath and/or Wheezing  saccharomyces boulardii lyo 250 mg oral capsule: 1 cap(s) orally 2 times a day  spironolactone 25 mg oral tablet: 1 tab(s) orally once a day  Synthroid 112 mcg (0.112 mg) oral tablet: 1 tab(s) orally once a day

## 2024-05-14 NOTE — DISCHARGE NOTE PROVIDER - NSDCCPCAREPLAN_GEN_ALL_CORE_FT
PRINCIPAL DISCHARGE DIAGNOSIS  Diagnosis: Pleural effusion  Assessment and Plan of Treatment:       SECONDARY DISCHARGE DIAGNOSES  Diagnosis: Acute on chronic diastolic congestive heart failure  Assessment and Plan of Treatment:

## 2024-05-14 NOTE — DISCHARGE NOTE PROVIDER - CARE PROVIDER_API CALL
Kate Ball  Cardiovascular Disease  [address] [place] [id number]  Phone: [phone]  Fax: [phone]  Follow Up Time:

## 2024-05-14 NOTE — DISCHARGE NOTE PROVIDER - ATTENDING DISCHARGE PHYSICAL EXAMINATION:
VITALS:   T(C): 36.8 (05-14-24 @ 07:47), Max: 37.6 (05-13-24 @ 21:02)  HR: 59 (05-14-24 @ 07:47) (59 - 69)  BP: 121/61 (05-14-24 @ 07:47) (121/61 - 126/57)  RR: 18 (05-14-24 @ 07:47) (17 - 18)  SpO2: 97% (05-14-24 @ 07:47) (96% - 99%)    GENERAL: NAD, lying in bed comfortably, on NC  HEAD:  Atraumatic, Normocephalic  LUNG: b/l crackles noted, worse on right  HEART: Regular rate and rhythm; No LE edema  ABDOMEN: BSx4; Soft, nontender, nondistended  SKIN: No rashes or lesions

## 2024-05-14 NOTE — PROGRESS NOTE ADULT - REASON FOR ADMISSION
Pleural effusion  HFpEF exacerbation

## 2024-05-14 NOTE — DISCHARGE NOTE PROVIDER - HOSPITAL COURSE
89 y.o female with a past medical history of Hypothyroid,  HTN, bifascicular block, paroxysmal Afib not on anticoagulation, endocarditis, and HFpEF admitted with pleural effusion, hfpef exacerbation. CXR sm to mod partially R loculated pl effusion. Probnp 1627. c/w lasix adjusted from IV to PO 5/12 now decreasing to 20 mg daily. Seen by cardiology and CT surg, plan for conservative management. Now optimized for transfer to nursing home with transition to hospice.

## 2024-05-24 ENCOUNTER — TRANSCRIPTION ENCOUNTER (OUTPATIENT)
Age: 89
End: 2024-05-24

## 2024-05-31 ENCOUNTER — TRANSCRIPTION ENCOUNTER (OUTPATIENT)
Age: 89
End: 2024-05-31

## 2024-06-07 ENCOUNTER — TRANSCRIPTION ENCOUNTER (OUTPATIENT)
Age: 89
End: 2024-06-07

## 2024-06-11 ENCOUNTER — TRANSCRIPTION ENCOUNTER (OUTPATIENT)
Age: 89
End: 2024-06-11
